# Patient Record
Sex: FEMALE | Race: WHITE | NOT HISPANIC OR LATINO | Employment: FULL TIME | URBAN - METROPOLITAN AREA
[De-identification: names, ages, dates, MRNs, and addresses within clinical notes are randomized per-mention and may not be internally consistent; named-entity substitution may affect disease eponyms.]

---

## 2017-10-11 ENCOUNTER — TRANSCRIBE ORDERS (OUTPATIENT)
Dept: ADMINISTRATIVE | Facility: HOSPITAL | Age: 57
End: 2017-10-11

## 2017-10-11 DIAGNOSIS — Z12.39 BREAST CANCER SCREENING: Primary | ICD-10-CM

## 2017-10-19 ENCOUNTER — HOSPITAL ENCOUNTER (OUTPATIENT)
Dept: RADIOLOGY | Facility: HOSPITAL | Age: 57
Discharge: HOME | End: 2017-10-19
Attending: OBSTETRICS & GYNECOLOGY
Payer: COMMERCIAL

## 2017-10-19 DIAGNOSIS — Z12.39 BREAST CANCER SCREENING: ICD-10-CM

## 2017-10-19 PROCEDURE — G0202 SCR MAMMO BI INCL CAD: HCPCS

## 2018-11-28 LAB — HBA1C MFR BLD HPLC: 8.3 %

## 2019-03-01 LAB
CREAT ?TM UR-SCNC: 17.2 UMOL/L
CREAT ?TM UR-SCNC: 17.2 UMOL/L
HBA1C MFR BLD HPLC: 7.8 %
HBA1C MFR BLD HPLC: 7.8 %
MICROALBUMIN/CREAT UR: <17.4 MG/G{CREAT}

## 2019-07-23 LAB
CREAT ?TM UR-SCNC: 19.6 UMOL/L
HBA1C MFR BLD HPLC: 6.6 %

## 2019-11-01 LAB
HBA1C MFR BLD HPLC: 5.6 %
HBA1C MFR BLD HPLC: 6.6 %

## 2020-02-13 ENCOUNTER — OFFICE VISIT (OUTPATIENT)
Dept: ENDOCRINOLOGY | Facility: CLINIC | Age: 60
End: 2020-02-13
Payer: COMMERCIAL

## 2020-02-13 VITALS — WEIGHT: 257.6 LBS | SYSTOLIC BLOOD PRESSURE: 134 MMHG | HEART RATE: 84 BPM | DIASTOLIC BLOOD PRESSURE: 78 MMHG

## 2020-02-13 DIAGNOSIS — I10 ESSENTIAL HYPERTENSION: ICD-10-CM

## 2020-02-13 DIAGNOSIS — E78.2 MIXED HYPERLIPIDEMIA: ICD-10-CM

## 2020-02-13 DIAGNOSIS — E55.9 VITAMIN D DEFICIENCY: ICD-10-CM

## 2020-02-13 DIAGNOSIS — Z98.84 HISTORY OF WEIGHT LOSS SURGERY: ICD-10-CM

## 2020-02-13 DIAGNOSIS — E11.65 TYPE 2 DIABETES MELLITUS WITH HYPERGLYCEMIA, WITHOUT LONG-TERM CURRENT USE OF INSULIN (HCC): Primary | ICD-10-CM

## 2020-02-13 LAB — SL AMB POCT HEMOGLOBIN AIC: 6.9 (ref ?–6.5)

## 2020-02-13 PROCEDURE — 83036 HEMOGLOBIN GLYCOSYLATED A1C: CPT | Performed by: INTERNAL MEDICINE

## 2020-02-13 PROCEDURE — 99204 OFFICE O/P NEW MOD 45 MIN: CPT | Performed by: INTERNAL MEDICINE

## 2020-02-13 RX ORDER — MULTIVIT-MIN/IRON FUM/FOLIC AC 7.5 MG-4
1 TABLET ORAL DAILY
COMMUNITY

## 2020-02-13 RX ORDER — MOMETASONE FUROATE 1 MG/ML
SOLUTION TOPICAL DAILY
COMMUNITY
End: 2021-05-26

## 2020-02-13 RX ORDER — LANOLIN ALCOHOL/MO/W.PET/CERES
10000 CREAM (GRAM) TOPICAL DAILY
COMMUNITY

## 2020-02-13 RX ORDER — ZOLPIDEM TARTRATE 5 MG/1
2.5 TABLET ORAL
COMMUNITY

## 2020-02-13 RX ORDER — FLASH GLUCOSE SENSOR
KIT MISCELLANEOUS
COMMUNITY
End: 2020-07-23 | Stop reason: SDUPTHER

## 2020-02-13 RX ORDER — OCTISALATE, AVOBENZONE, HOMOSALATE, AND OCTOCRYLENE 29.4; 29.4; 49; 25.48 MG/ML; MG/ML; MG/ML; MG/ML
LOTION TOPICAL
COMMUNITY

## 2020-02-13 RX ORDER — LISINOPRIL 2.5 MG/1
2.5 TABLET ORAL DAILY
COMMUNITY

## 2020-02-13 RX ORDER — FLASH GLUCOSE SCANNING READER
EACH MISCELLANEOUS
COMMUNITY

## 2020-02-13 RX ORDER — ROSUVASTATIN CALCIUM 5 MG/1
5 TABLET, COATED ORAL DAILY
COMMUNITY

## 2020-02-13 RX ORDER — BIOTIN 1000 MCG
6000 TABLET,CHEWABLE ORAL
COMMUNITY

## 2020-02-13 RX ORDER — CALCIUM CARBONATE 500(1250)
TABLET ORAL
COMMUNITY
Start: 2015-01-21

## 2020-02-13 RX ORDER — NYSTATIN 100000 U/G
CREAM TOPICAL 2 TIMES DAILY
COMMUNITY

## 2020-02-13 RX ORDER — LANSOPRAZOLE 15 MG/1
15 CAPSULE, DELAYED RELEASE ORAL DAILY
COMMUNITY

## 2020-02-13 RX ORDER — GLIMEPIRIDE 1 MG/1
0.5 TABLET ORAL
COMMUNITY
End: 2020-02-13 | Stop reason: ALTCHOICE

## 2020-02-13 NOTE — PROGRESS NOTES
Serena Thomas Race 63 y o  female MRN: [id number]    Encounter: [id number]      Assessment/Plan     Assessment: This is a 63y o -year-old female with diabetes with hyperglycemia  Plan:    Diagnoses and all orders for this visit:    Type 2 diabetes mellitus with hyperglycemia, without long-term current use of insulin ([facility] )  Lab Results   Component Value Date    HGBA1C 6 9 (A) 02/13/2020    A1c is 6 9 percent  Goal is 6 5 percent  Will discontinue glimepiride, as patient is having hypoglycemic events if she skips her meals sometimes  Will start Farxiga 5 milligram daily, discussed the efficacy and safety and risk of vaginal infection as well as urine tract infection  Continue metformin 1000 milligram twice a day  Will have to monitor her creatinine closely  Obtain basic metabolic profile in 1 month  Discussed hypoglycemia symptoms and treatment    Discussed importance of follow-up with Ophthalmology and podiatry  -     Dapagliflozin Propanediol (FARXIGA) 5 MG TABS; Take one tablet daily with breakfast  -     Basic metabolic panel; Future  -     Basic metabolic panel; Future  -     Microalbumin / creatinine urine ratio; Future  -     Hemoglobin A1C; Future  -     POCT hemoglobin A1c    Mixed hyperlipidemia  Continue Crestor 5 milligram daily  -     Lipid panel Lab Collect Lab Collect; Future    Essential hypertension  Pressure well controlled  Continue current management    Vitamin D deficiency  Is currently taking vitamin-D supplement 4000 International Units daily  Followed by PCP  History of weight loss surgery    Discussed importance of weight loss, lifestyle modifications, dietary modifications such as cutting down on free sugars, free carbohydrates, as well as saturated fats  Discussed to increase serving of fruits and vegetables in diet(3-5 servings per day)   Other orders  -     Calcium 500 MG tablet; Take by mouth  -     Probiotic Product (ALIGN) 4 MG CAPS;  Take by mouth  -     vitamin B-12 (VITAMIN B-12) 1,000 mcg tablet; Take 10,000 mcg by mouth daily  -     Continuous Blood Gluc  (FREESTYLE FRANKI 14 DAY READER) SUZY; by Does not apply route  -     Continuous Blood Gluc Sensor (FREESTYLE FRANKI 14 DAY SENSOR) MISC; by Does not apply route  -     Discontinue: glimepiride (AMARYL) 1 mg tablet; Take 0 5 mg by mouth every morning before breakfast  -     lisinopril (ZESTRIL) 2 5 mg tablet; Take 2 5 mg by mouth daily  -     Biotin 1000 MCG CHEW; Chew 6,000 mcg  -     metFORMIN (GLUCOPHAGE) 500 mg tablet; Take 1,000 mg by mouth 2 (two) times a day with meals  -     mometasone (ELOCON) 0 1 % lotion; Apply topically daily  -     Multiple Vitamins-Minerals (MULTIVITAMIN WITH MINERALS) tablet; Take 1 tablet by mouth daily  -     nystatin (MYCOSTATIN) cream; Apply topically 2 (two) times a day  -     lansoprazole (PREVACID) 15 mg capsule; Take 15 mg by mouth daily  -     Oxymetazoline HCl (RHOFADE) 1 % CREA; Apply topically  -     rosuvastatin (CRESTOR) 5 mg tablet; Take 5 mg by mouth daily  -     Cholecalciferol (VITAMIN D3 SUPER STRENGTH) 50 MCG (2000 UT) CAPS; Take 4,000 Units by mouth  -     zolpidem (AMBIEN) 5 mg tablet; Take 2 5 mg by mouth daily at bedtime as needed for sleep        CC: Diabetes    History of Present Illness     HPI:    Evelia Mejia is 63 yr old woman with PMH of type 2 DM, for 10 yrs, hypertension, hyperlipidemia is here for evaluation of type 2 diabetes management  History of gastric sleeve surgery for weight loss 5 years ago, lost 100 lb after surgery and she has gained back few lb recently  She has history of insulin use previously before surgery for diabetes management  Since surgery she was started on oral medications    Currently she is taking metformin 1000 mg twice a day, glimepiride 0 5 mg with breakfast  Denies having hypoglycemia frequently  She  gets low blood sugar twice a month    She checks blood sugars 4 times daily, using Free style franki sensor   We could not download her sensor   Her average blood sugars are in 120 range  No hypoglycemia in last 2 weeks    For hypertension she is currently taking lisinopril 2 5 mg daily  For hyperlipidemia she is taking Crestor 5 mg daily    She has history of pancreatitis from [place]   She is also taking vitamin supplementation and calcium supplementation, for weight loss surgery status, which is monitored by her primary care physician        No results found for: CREATININE         Lab Results   Component Value Date    HGBA1C 6 9 (A) 02/13/2020         No results found for: [id number], TSH, X6KOWDY, G7UGNFF, THYROIDAB      Review of Systems   Constitutional: Negative for activity change, diaphoresis, fatigue, fever and unexpected weight change  HENT: Negative  Eyes: Negative for visual disturbance  Respiratory: Negative for cough, chest tightness and shortness of breath  Cardiovascular: Negative for chest pain, palpitations and leg swelling  Gastrointestinal: Negative for abdominal pain, blood in stool, constipation, diarrhea, nausea and vomiting  Endocrine: Negative for cold intolerance, heat intolerance, polydipsia, polyphagia and polyuria  Genitourinary: Negative for dysuria, enuresis, frequency and urgency  Musculoskeletal: Negative for arthralgias and myalgias  Skin: Negative for pallor, rash and wound  Allergic/Immunologic: Negative  Neurological: Negative for dizziness, tremors, weakness and numbness  Hematological: Negative  Psychiatric/Behavioral: Negative          Historical Information   Past Medical History:   Diagnosis Date    Acid reflux     CKD (chronic kidney disease), stage II     Essential hypertension     Hyperlipidemia     Type 2 diabetes mellitus (HCC)      Past Surgical History:   Procedure Laterality Date    ARTHROSCOPIC REPAIR ACL Right     knee    BARIATRIC SURGERY      gastric sleeve    GALLBLADDER SURGERY      SKIN BIOPSY      scalp    TENDON REPAIR Left     ankle    TONSILLECTOMY       Social History   Social History     Substance and Sexual Activity   Alcohol Use Not on file     Social History     Substance and Sexual Activity   Drug Use Not on file     Social History     Tobacco Use   Smoking Status Never Smoker   Smokeless Tobacco Never Used     Family History:   Family History   Problem Relation Age of Onset    Hypertension Mother     Diabetes type II Father     Diabetes type II Paternal Grandmother        Meds/Allergies   Current Outpatient Medications   Medication Sig Dispense Refill    Biotin 1000 MCG CHEW Chew 6,000 mcg      Calcium 500 MG tablet Take by mouth      Cholecalciferol (VITAMIN D3 SUPER STRENGTH) 50 MCG (2000 UT) CAPS Take 4,000 Units by mouth      Continuous Blood Gluc  (FREESTYLE FRANKI 14 DAY READER) SUZY by Does not apply route      Continuous Blood Gluc Sensor (FREESTYLE FRANKI 14 DAY SENSOR) MISC by Does not apply route      lansoprazole (PREVACID) 15 mg capsule Take 15 mg by mouth daily      lisinopril (ZESTRIL) 2 5 mg tablet Take 2 5 mg by mouth daily      metFORMIN (GLUCOPHAGE) 500 mg tablet Take 1,000 mg by mouth 2 (two) times a day with meals      mometasone (ELOCON) 0 1 % lotion Apply topically daily      Multiple Vitamins-Minerals (MULTIVITAMIN WITH MINERALS) tablet Take 1 tablet by mouth daily      nystatin (MYCOSTATIN) cream Apply topically 2 (two) times a day      Oxymetazoline HCl (RHOFADE) 1 % CREA Apply topically      Probiotic Product (ALIGN) 4 MG CAPS Take by mouth      rosuvastatin (CRESTOR) 5 mg tablet Take 5 mg by mouth daily      vitamin B-12 (VITAMIN B-12) 1,000 mcg tablet Take 10,000 mcg by mouth daily      zolpidem (AMBIEN) 5 mg tablet Take 2 5 mg by mouth daily at bedtime as needed for sleep      Dapagliflozin Propanediol (FARXIGA) 5 MG TABS Take one tablet daily with breakfast 30 tablet 3     No current facility-administered medications for this visit        Allergies   Allergen Reactions    Ampicillin  Darvon [Propoxyphene]     Vioxx [Rofecoxib]        Objective   Vitals: Blood pressure 134/78, pulse 84, weight 117 kg (257 lb 9 6 oz)  Physical Exam   Constitutional: She is oriented to person, place, and time  She appears well-developed and well-nourished  No distress  Obese, no facial plethora   HENT:   Head: Normocephalic and atraumatic  Eyes: Conjunctivae and EOM are normal  Right eye exhibits no discharge  Left eye exhibits no discharge  Neck: Normal range of motion  Neck supple  No thyromegaly present  Cardiovascular: Normal rate and regular rhythm  No murmur heard  Pulmonary/Chest: Effort normal and breath sounds normal  She has no wheezes  Abdominal: Soft  Bowel sounds are normal  She exhibits no distension  Musculoskeletal: Normal range of motion  She exhibits no edema, tenderness or deformity  Neurological: She is alert and oriented to person, place, and time  She has normal reflexes  She displays normal reflexes  No cranial nerve deficit  She exhibits normal muscle tone  Skin: Skin is warm and dry  No rash noted  She is not diaphoretic  No erythema  Psychiatric: She has a normal mood and affect  Her behavior is normal    Vitals reviewed  The history was obtained from the review of the chart, patient  Lab Results:   Lab Results   Component Value Date/Time    Hemoglobin A1C 6 9 (A) 02/13/2020 09:45 AM    Hemoglobin A1C 5 6 11/01/2019    Hemoglobin A1C 7 8 03/01/2019           Imaging Studies: I have personally reviewed pertinent reports  Portions of the record may have been created with voice recognition software  Occasional wrong word or "sound a like" substitutions may have occurred due to the inherent limitations of voice recognition software  Read the chart carefully and recognize, using context, where substitutions have occurred

## 2020-02-24 DIAGNOSIS — E66.9 DIABETES MELLITUS TYPE 2 IN OBESE (HCC): Primary | ICD-10-CM

## 2020-02-24 DIAGNOSIS — E11.69 DIABETES MELLITUS TYPE 2 IN OBESE (HCC): Primary | ICD-10-CM

## 2020-02-27 DIAGNOSIS — E11.69 DIABETES MELLITUS TYPE 2 IN OBESE (HCC): Primary | ICD-10-CM

## 2020-02-27 DIAGNOSIS — E11.65 UNCONTROLLED TYPE 2 DIABETES MELLITUS WITH HYPERGLYCEMIA (HCC): ICD-10-CM

## 2020-02-27 DIAGNOSIS — E66.9 DIABETES MELLITUS TYPE 2 IN OBESE (HCC): Primary | ICD-10-CM

## 2020-02-27 RX ORDER — METFORMIN HYDROCHLORIDE 500 MG/1
1000 TABLET, EXTENDED RELEASE ORAL 2 TIMES DAILY WITH MEALS
Qty: 360 TABLET | Refills: 1 | Status: SHIPPED | OUTPATIENT
Start: 2020-02-27 | End: 2020-09-09

## 2020-02-27 NOTE — TELEPHONE ENCOUNTER
LM that Metformin ER was called into local pharm   Asked that pt call with any questions or concerns

## 2020-02-27 NOTE — TELEPHONE ENCOUNTER
Patient called said she was on Metformin ER, but Metformin was called in  Do you want patient on Metformin ER or HCL?   Please advise

## 2020-05-21 ENCOUNTER — TELEMEDICINE (OUTPATIENT)
Dept: ENDOCRINOLOGY | Facility: CLINIC | Age: 60
End: 2020-05-21
Payer: COMMERCIAL

## 2020-05-21 DIAGNOSIS — E11.65 TYPE 2 DIABETES MELLITUS WITH HYPERGLYCEMIA, WITHOUT LONG-TERM CURRENT USE OF INSULIN (HCC): Primary | ICD-10-CM

## 2020-05-21 DIAGNOSIS — E78.2 MIXED HYPERLIPIDEMIA: ICD-10-CM

## 2020-05-21 DIAGNOSIS — I10 ESSENTIAL HYPERTENSION: ICD-10-CM

## 2020-05-21 PROCEDURE — 99214 OFFICE O/P EST MOD 30 MIN: CPT | Performed by: PHYSICIAN ASSISTANT

## 2020-06-03 ENCOUNTER — APPOINTMENT (OUTPATIENT)
Dept: LAB | Facility: CLINIC | Age: 60
End: 2020-06-03
Payer: COMMERCIAL

## 2020-06-03 DIAGNOSIS — E78.2 MIXED HYPERLIPIDEMIA: ICD-10-CM

## 2020-06-03 DIAGNOSIS — E11.65 TYPE 2 DIABETES MELLITUS WITH HYPERGLYCEMIA, WITHOUT LONG-TERM CURRENT USE OF INSULIN (HCC): ICD-10-CM

## 2020-06-03 LAB
ANION GAP SERPL CALCULATED.3IONS-SCNC: 8 MMOL/L (ref 4–13)
BUN SERPL-MCNC: 19 MG/DL (ref 5–25)
CALCIUM SERPL-MCNC: 9.1 MG/DL (ref 8.3–10.1)
CHLORIDE SERPL-SCNC: 102 MMOL/L (ref 100–108)
CHOLEST SERPL-MCNC: 171 MG/DL (ref 50–200)
CO2 SERPL-SCNC: 25 MMOL/L (ref 21–32)
CREAT SERPL-MCNC: 0.8 MG/DL (ref 0.6–1.3)
CREAT UR-MCNC: 48 MG/DL
EST. AVERAGE GLUCOSE BLD GHB EST-MCNC: 169 MG/DL
GFR SERPL CREATININE-BSD FRML MDRD: 82 ML/MIN/1.73SQ M
GLUCOSE P FAST SERPL-MCNC: 163 MG/DL (ref 65–99)
HBA1C MFR BLD: 7.5 %
HDLC SERPL-MCNC: 69 MG/DL
LDLC SERPL CALC-MCNC: 76 MG/DL (ref 0–100)
MICROALBUMIN UR-MCNC: 8.2 MG/L (ref 0–20)
MICROALBUMIN/CREAT 24H UR: 17 MG/G CREATININE (ref 0–30)
NONHDLC SERPL-MCNC: 102 MG/DL
POTASSIUM SERPL-SCNC: 4.2 MMOL/L (ref 3.5–5.3)
SODIUM SERPL-SCNC: 135 MMOL/L (ref 136–145)
TRIGL SERPL-MCNC: 129 MG/DL

## 2020-06-03 PROCEDURE — 80061 LIPID PANEL: CPT

## 2020-06-03 PROCEDURE — 83036 HEMOGLOBIN GLYCOSYLATED A1C: CPT

## 2020-06-03 PROCEDURE — 82043 UR ALBUMIN QUANTITATIVE: CPT

## 2020-06-03 PROCEDURE — 80048 BASIC METABOLIC PNL TOTAL CA: CPT

## 2020-06-03 PROCEDURE — 82570 ASSAY OF URINE CREATININE: CPT

## 2020-06-03 PROCEDURE — 36415 COLL VENOUS BLD VENIPUNCTURE: CPT

## 2020-06-08 ENCOUNTER — TELEPHONE (OUTPATIENT)
Dept: ENDOCRINOLOGY | Facility: CLINIC | Age: 60
End: 2020-06-08

## 2020-06-11 DIAGNOSIS — E11.65 TYPE 2 DIABETES MELLITUS WITH HYPERGLYCEMIA, WITHOUT LONG-TERM CURRENT USE OF INSULIN (HCC): ICD-10-CM

## 2020-07-23 DIAGNOSIS — E11.65 TYPE 2 DIABETES MELLITUS WITH HYPERGLYCEMIA, WITHOUT LONG-TERM CURRENT USE OF INSULIN (HCC): Primary | ICD-10-CM

## 2020-07-23 RX ORDER — FLASH GLUCOSE SENSOR
1 KIT MISCELLANEOUS
Qty: 6 EACH | Refills: 3 | Status: SHIPPED | OUTPATIENT
Start: 2020-07-23 | End: 2021-08-18 | Stop reason: SDUPTHER

## 2020-07-23 NOTE — TELEPHONE ENCOUNTER
Pt needs an order for a marija sensor  Hers fell off, she had to use her 2nd one up spoke with the nurse, but pt will have to pay for a sensor, insurance wont cover it    Please send the order to [facility]

## 2020-09-09 DIAGNOSIS — E11.65 UNCONTROLLED TYPE 2 DIABETES MELLITUS WITH HYPERGLYCEMIA (HCC): ICD-10-CM

## 2020-09-09 RX ORDER — METFORMIN HYDROCHLORIDE 500 MG/1
TABLET, EXTENDED RELEASE ORAL
Qty: 120 TABLET | Refills: 5 | Status: SHIPPED | OUTPATIENT
Start: 2020-09-09 | End: 2021-02-19

## 2020-09-18 ENCOUNTER — TELEPHONE (OUTPATIENT)
Dept: ENDOCRINOLOGY | Facility: CLINIC | Age: 60
End: 2020-09-18

## 2020-09-18 NOTE — TELEPHONE ENCOUNTER
Pt r/s her appt so she can get her labs done prior  She stated she is having issues with the farxiga, she thinks  Sugars have been running high, not sure if she needs a higher dose   she is sharing her [employer] with us,

## 2020-09-30 ENCOUNTER — TELEPHONE (OUTPATIENT)
Dept: OBGYN CLINIC | Facility: HOSPITAL | Age: 60
End: 2020-09-30

## 2020-09-30 DIAGNOSIS — E11.65 TYPE 2 DIABETES MELLITUS WITH HYPERGLYCEMIA, WITHOUT LONG-TERM CURRENT USE OF INSULIN (HCC): ICD-10-CM

## 2020-09-30 RX ORDER — DAPAGLIFLOZIN 5 MG/1
TABLET, FILM COATED ORAL
Qty: 30 TABLET | Refills: 3 | Status: SHIPPED | OUTPATIENT
Start: 2020-09-30 | End: 2020-11-16

## 2020-10-14 ENCOUNTER — OFFICE VISIT (OUTPATIENT)
Dept: OBGYN CLINIC | Facility: CLINIC | Age: 60
End: 2020-10-14
Payer: COMMERCIAL

## 2020-10-14 ENCOUNTER — APPOINTMENT (OUTPATIENT)
Dept: RADIOLOGY | Facility: CLINIC | Age: 60
End: 2020-10-14
Payer: COMMERCIAL

## 2020-10-14 VITALS
HEIGHT: 65 IN | HEART RATE: 62 BPM | DIASTOLIC BLOOD PRESSURE: 84 MMHG | WEIGHT: 257.2 LBS | SYSTOLIC BLOOD PRESSURE: 132 MMHG | BODY MASS INDEX: 42.85 KG/M2 | TEMPERATURE: 99.1 F

## 2020-10-14 DIAGNOSIS — G89.29 CHRONIC PAIN OF RIGHT KNEE: ICD-10-CM

## 2020-10-14 DIAGNOSIS — M25.561 CHRONIC PAIN OF RIGHT KNEE: ICD-10-CM

## 2020-10-14 DIAGNOSIS — M17.31 POST-TRAUMATIC OSTEOARTHRITIS OF RIGHT KNEE: Primary | ICD-10-CM

## 2020-10-14 DIAGNOSIS — E11.9 TYPE 2 DIABETES MELLITUS WITHOUT COMPLICATION, WITHOUT LONG-TERM CURRENT USE OF INSULIN (HCC): ICD-10-CM

## 2020-10-14 DIAGNOSIS — M25.561 RIGHT KNEE PAIN, UNSPECIFIED CHRONICITY: ICD-10-CM

## 2020-10-14 PROCEDURE — 73562 X-RAY EXAM OF KNEE 3: CPT

## 2020-10-14 PROCEDURE — 99203 OFFICE O/P NEW LOW 30 MIN: CPT | Performed by: ORTHOPAEDIC SURGERY

## 2020-11-12 ENCOUNTER — OFFICE VISIT (OUTPATIENT)
Dept: OBGYN CLINIC | Facility: CLINIC | Age: 60
End: 2020-11-12
Payer: COMMERCIAL

## 2020-11-12 VITALS
WEIGHT: 260 LBS | DIASTOLIC BLOOD PRESSURE: 84 MMHG | BODY MASS INDEX: 43.32 KG/M2 | SYSTOLIC BLOOD PRESSURE: 129 MMHG | HEIGHT: 65 IN | HEART RATE: 71 BPM

## 2020-11-12 DIAGNOSIS — M17.31 POST-TRAUMATIC OSTEOARTHRITIS OF RIGHT KNEE: Primary | ICD-10-CM

## 2020-11-12 DIAGNOSIS — M25.561 CHRONIC PAIN OF RIGHT KNEE: ICD-10-CM

## 2020-11-12 DIAGNOSIS — G89.29 CHRONIC PAIN OF RIGHT KNEE: ICD-10-CM

## 2020-11-12 PROCEDURE — 20610 DRAIN/INJ JOINT/BURSA W/O US: CPT | Performed by: ORTHOPAEDIC SURGERY

## 2020-11-13 ENCOUNTER — TRANSCRIBE ORDERS (OUTPATIENT)
Dept: LAB | Facility: CLINIC | Age: 60
End: 2020-11-13

## 2020-11-13 ENCOUNTER — LAB (OUTPATIENT)
Dept: LAB | Facility: CLINIC | Age: 60
End: 2020-11-13
Payer: COMMERCIAL

## 2020-11-13 DIAGNOSIS — E11.65 TYPE 2 DIABETES MELLITUS WITH HYPERGLYCEMIA, WITHOUT LONG-TERM CURRENT USE OF INSULIN (HCC): ICD-10-CM

## 2020-11-13 DIAGNOSIS — I10 ESSENTIAL HYPERTENSION: ICD-10-CM

## 2020-11-13 LAB
ANION GAP SERPL CALCULATED.3IONS-SCNC: 5 MMOL/L (ref 4–13)
BUN SERPL-MCNC: 15 MG/DL (ref 5–25)
CALCIUM SERPL-MCNC: 9.5 MG/DL (ref 8.3–10.1)
CHLORIDE SERPL-SCNC: 101 MMOL/L (ref 100–108)
CO2 SERPL-SCNC: 30 MMOL/L (ref 21–32)
CREAT SERPL-MCNC: 0.88 MG/DL (ref 0.6–1.3)
EST. AVERAGE GLUCOSE BLD GHB EST-MCNC: 194 MG/DL
GFR SERPL CREATININE-BSD FRML MDRD: 72 ML/MIN/1.73SQ M
GLUCOSE P FAST SERPL-MCNC: 161 MG/DL (ref 65–99)
HBA1C MFR BLD: 8.4 %
POTASSIUM SERPL-SCNC: 4.4 MMOL/L (ref 3.5–5.3)
SODIUM SERPL-SCNC: 136 MMOL/L (ref 136–145)

## 2020-11-13 PROCEDURE — 83036 HEMOGLOBIN GLYCOSYLATED A1C: CPT

## 2020-11-13 PROCEDURE — 80048 BASIC METABOLIC PNL TOTAL CA: CPT

## 2020-11-13 PROCEDURE — 36415 COLL VENOUS BLD VENIPUNCTURE: CPT

## 2020-11-16 ENCOUNTER — OFFICE VISIT (OUTPATIENT)
Dept: ENDOCRINOLOGY | Facility: CLINIC | Age: 60
End: 2020-11-16
Payer: COMMERCIAL

## 2020-11-16 VITALS
SYSTOLIC BLOOD PRESSURE: 124 MMHG | BODY MASS INDEX: 43.43 KG/M2 | WEIGHT: 261 LBS | DIASTOLIC BLOOD PRESSURE: 78 MMHG | TEMPERATURE: 97.1 F | HEART RATE: 71 BPM

## 2020-11-16 DIAGNOSIS — E11.65 TYPE 2 DIABETES MELLITUS WITH HYPERGLYCEMIA, WITHOUT LONG-TERM CURRENT USE OF INSULIN (HCC): Primary | ICD-10-CM

## 2020-11-16 DIAGNOSIS — I10 ESSENTIAL HYPERTENSION: ICD-10-CM

## 2020-11-16 DIAGNOSIS — E78.2 MIXED HYPERLIPIDEMIA: ICD-10-CM

## 2020-11-16 PROCEDURE — 99214 OFFICE O/P EST MOD 30 MIN: CPT | Performed by: PHYSICIAN ASSISTANT

## 2020-11-16 RX ORDER — DAPAGLIFLOZIN 10 MG/1
10 TABLET, FILM COATED ORAL DAILY
Qty: 30 TABLET | Refills: 5 | Status: SHIPPED | OUTPATIENT
Start: 2020-11-16 | End: 2021-04-19

## 2021-02-17 ENCOUNTER — TELEPHONE (OUTPATIENT)
Dept: ENDOCRINOLOGY | Facility: CLINIC | Age: 61
End: 2021-02-17

## 2021-02-19 DIAGNOSIS — E11.65 UNCONTROLLED TYPE 2 DIABETES MELLITUS WITH HYPERGLYCEMIA (HCC): ICD-10-CM

## 2021-02-19 RX ORDER — METFORMIN HYDROCHLORIDE 500 MG/1
TABLET, EXTENDED RELEASE ORAL
Qty: 120 TABLET | Refills: 5 | Status: SHIPPED | OUTPATIENT
Start: 2021-02-19 | End: 2021-09-11

## 2021-02-22 ENCOUNTER — LAB (OUTPATIENT)
Dept: LAB | Facility: CLINIC | Age: 61
End: 2021-02-22
Payer: COMMERCIAL

## 2021-02-22 ENCOUNTER — TRANSCRIBE ORDERS (OUTPATIENT)
Dept: LAB | Facility: CLINIC | Age: 61
End: 2021-02-22

## 2021-02-22 DIAGNOSIS — I10 ESSENTIAL HYPERTENSION: ICD-10-CM

## 2021-02-22 DIAGNOSIS — E11.65 TYPE 2 DIABETES MELLITUS WITH HYPERGLYCEMIA, WITHOUT LONG-TERM CURRENT USE OF INSULIN (HCC): ICD-10-CM

## 2021-02-22 LAB
ANION GAP SERPL CALCULATED.3IONS-SCNC: 5 MMOL/L (ref 4–13)
BUN SERPL-MCNC: 18 MG/DL (ref 5–25)
CALCIUM SERPL-MCNC: 9.9 MG/DL (ref 8.3–10.1)
CHLORIDE SERPL-SCNC: 103 MMOL/L (ref 100–108)
CO2 SERPL-SCNC: 30 MMOL/L (ref 21–32)
CREAT SERPL-MCNC: 0.82 MG/DL (ref 0.6–1.3)
EST. AVERAGE GLUCOSE BLD GHB EST-MCNC: 171 MG/DL
GFR SERPL CREATININE-BSD FRML MDRD: 79 ML/MIN/1.73SQ M
GLUCOSE P FAST SERPL-MCNC: 167 MG/DL (ref 65–99)
HBA1C MFR BLD: 7.6 %
POTASSIUM SERPL-SCNC: 4.4 MMOL/L (ref 3.5–5.3)
SODIUM SERPL-SCNC: 138 MMOL/L (ref 136–145)

## 2021-02-22 PROCEDURE — 83036 HEMOGLOBIN GLYCOSYLATED A1C: CPT

## 2021-02-22 PROCEDURE — 80048 BASIC METABOLIC PNL TOTAL CA: CPT

## 2021-02-22 PROCEDURE — 36415 COLL VENOUS BLD VENIPUNCTURE: CPT

## 2021-02-23 ENCOUNTER — OFFICE VISIT (OUTPATIENT)
Dept: ENDOCRINOLOGY | Facility: CLINIC | Age: 61
End: 2021-02-23
Payer: COMMERCIAL

## 2021-02-23 ENCOUNTER — TELEPHONE (OUTPATIENT)
Dept: ENDOCRINOLOGY | Facility: CLINIC | Age: 61
End: 2021-02-23

## 2021-02-23 VITALS
SYSTOLIC BLOOD PRESSURE: 134 MMHG | HEART RATE: 64 BPM | TEMPERATURE: 98.2 F | WEIGHT: 259.4 LBS | BODY MASS INDEX: 43.17 KG/M2 | DIASTOLIC BLOOD PRESSURE: 80 MMHG

## 2021-02-23 DIAGNOSIS — I10 ESSENTIAL HYPERTENSION: ICD-10-CM

## 2021-02-23 DIAGNOSIS — E11.65 TYPE 2 DIABETES MELLITUS WITH HYPERGLYCEMIA, WITHOUT LONG-TERM CURRENT USE OF INSULIN (HCC): Primary | ICD-10-CM

## 2021-02-23 DIAGNOSIS — E78.2 MIXED HYPERLIPIDEMIA: ICD-10-CM

## 2021-02-23 PROCEDURE — 99214 OFFICE O/P EST MOD 30 MIN: CPT | Performed by: PHYSICIAN ASSISTANT

## 2021-02-23 NOTE — TELEPHONE ENCOUNTER
Pt called she forgot to ask you if there is a way you can recommend she get the Covid shot  As far as I know we arent doing the sched yet of them? ?

## 2021-02-23 NOTE — PATIENT INSTRUCTIONS
Hypoglycemia instructions   Manus Amen Race  2/23/2021  [id number]    Low Blood Sugar    Steps to treat low blood sugar  1  Test blood sugar if you have symptoms of low blood sugar:   Low Blood Sugar Symptoms:  o Sweaty  o Dizzy  o Rapid heartbeat  o Shaky  o Bad mood  o Hungry      2  Treat blood sugar less than 70 with 15 grams of fast-acting carbohydrate:   Examples of 15 grams Fast-Acting Carbohydrate:  o 4 oz juice  o 4 oz regular soda  o 3-4 glucose tablets (chew)  o 3-4 hard candies (chew)          3  Wait 15 minutes and test your blood sugar again     4   If blood sugar is less than 100, repeat steps 2-3     5  When your blood sugar is 100 or more, eat a snack if it will be longer than one hour until your next meal  The snack should be 15 grams of carbohydrate and a protein:   Examples of snacks:  o ½ sandwich  o 6 crackers with cheese  o Piece of fruit with cheese or peanut butter  o 6 crackers with peanut butter

## 2021-02-23 NOTE — PROGRESS NOTES
Patient Progress Note      CC: DM      [facility] Provider  [facility]  [address]     History of Present Illness:   Costa Mclaughlin is a 62 y o  female with a history of type 2 diabetes without long term use of insulin  She has been off of insulin since her gastric sleeve surgery  Diabetes course has been stable  Denies recent illness or hospitalizations  Denies any issues with her current regimen  Home glucose monitoring: are performed regularly, using Freestyle Ankit     Average glucose 147 mg/dl with glucose variability of 12 8%  In target range 95%, above range 5%, below range 0%  Most of the data is not captured  She will call the company to sort the issue out  Current regimen: metformin 1000 mg BID, Farxiga 10 mg daily  compliant most of the time, denies any side effects from medications  Hypoglycemic episodes: No, rare  H/o of hypoglycemia causing hospitalization or Intervention such as glucagon injection  or ambulance call :  No  Hypoglycemia symptoms: jitteriness  Treatment of hypoglycemia: discussed treatment     Medic alert tag: recommended: Yes     Diabetes education: Not recently  Diet: 3 meals per day, 1-2 snacks per day  Timing of meals is predictable  Diabetic diet compliance:  noncompliant some of the time  Activity: Daily activity is predictable: Yes  Exercise is limited right now due to the snow  Ophthamology: thinks she went in the end of 2020  [facility] in [place], [place]  Podiatry: Nov 2020      Has hypertension: on ACE inhibitor/ARB, compliant most of the time  Has hyperlipidemia: on statin - tolerating well, no myalgias  compliant most of the time, denies any side effects from medications    Thyroid disorders: No  History of pancreatitis: Yes (from [place])    Patient Active Problem List   Diagnosis    Type 2 diabetes mellitus ([facility] )    Essential hypertension    Mixed hyperlipidemia      Past Medical History:   Diagnosis Date    Acid reflux     CKD (chronic kidney disease), stage II     Essential hypertension     Hyperlipidemia     Type 2 diabetes mellitus (HCC)       Past Surgical History:   Procedure Laterality Date    ARTHROSCOPIC REPAIR ACL Right     knee    BARIATRIC SURGERY      gastric sleeve    GALLBLADDER SURGERY      SKIN BIOPSY      scalp    TENDON REPAIR Left     ankle    TONSILLECTOMY        Family History   Problem Relation Age of Onset    Hypertension Mother     Diabetes type II Father     Diabetes type II Paternal Grandmother      Social History     Tobacco Use    Smoking status: Never Smoker    Smokeless tobacco: Never Used   Substance Use Topics    Alcohol use: Yes     Comment: occ        Allergies   Allergen Reactions    Ampicillin     Darvon [Propoxyphene]     Vioxx [Rofecoxib]          Current Outpatient Medications:     Biotin 1000 MCG CHEW, Chew 6,000 mcg, Disp: , Rfl:     Calcium 500 MG tablet, Take by mouth, Disp: , Rfl:     Cholecalciferol (VITAMIN D3 SUPER STRENGTH) 50 MCG (2000 UT) CAPS, Take 2,000 Units by mouth , Disp: , Rfl:     Continuous Blood Gluc  (FREESTYLE FRANKI 14 DAY READER) SUZY, by Does not apply route, Disp: , Rfl:     Continuous Blood Gluc Sensor (FREESTYLE FRANKI 14 DAY SENSOR) MISC, 1 Device by Does not apply route every 14 (fourteen) days, Disp: 6 each, Rfl: 3    Dapagliflozin Propanediol (Farxiga) 10 MG TABS, Take 1 tablet (10 mg total) by mouth daily, Disp: 30 tablet, Rfl: 5    lansoprazole (PREVACID) 15 mg capsule, Take 15 mg by mouth daily, Disp: , Rfl:     lisinopril (ZESTRIL) 2 5 mg tablet, Take 2 5 mg by mouth daily, Disp: , Rfl:     metFORMIN (GLUCOPHAGE-XR) 500 mg 24 hr tablet, TAKE 2 TABLETS BY MOUTH TWICE A DAY WITH FOOD, Disp: 120 tablet, Rfl: 5    mometasone (ELOCON) 0 1 % lotion, Apply topically daily, Disp: , Rfl:     Multiple Vitamins-Minerals (MULTIVITAMIN WITH MINERALS) tablet, Take 1 tablet by mouth daily, Disp: , Rfl:     nystatin (MYCOSTATIN) cream, Apply topically 2 (two) times a day, Disp: , Rfl:     Oxymetazoline HCl (RHOFADE) 1 % CREA, Apply topically, Disp: , Rfl:     Probiotic Product (ALIGN) 4 MG CAPS, Take by mouth, Disp: , Rfl:     rosuvastatin (CRESTOR) 5 mg tablet, Take 5 mg by mouth daily, Disp: , Rfl:     vitamin B-12 (VITAMIN B-12) 1,000 mcg tablet, Take 10,000 mcg by mouth daily, Disp: , Rfl:     zolpidem (AMBIEN) 5 mg tablet, Take 2 5 mg by mouth daily at bedtime as needed for sleep, Disp: , Rfl:   Review of Systems   Constitutional: Negative for activity change, appetite change, fatigue and unexpected weight change  HENT: Negative for trouble swallowing  Eyes: Negative for visual disturbance  Respiratory: Negative for shortness of breath  Cardiovascular: Negative for chest pain and palpitations  Gastrointestinal: Negative for constipation and diarrhea  Endocrine: Negative for polydipsia and polyuria  Musculoskeletal: Negative  Skin: Negative for wound  Neurological: Negative for numbness  Psychiatric/Behavioral: Negative  Physical Exam:  Body mass index is 43 17 kg/m²  /80   Pulse 64   Temp 98 2 °F (36 8 °C)   Wt 118 kg (259 lb 6 4 oz)   BMI 43 17 kg/m²    Wt Readings from Last 3 Encounters:   02/23/21 118 kg (259 lb 6 4 oz)   11/16/20 118 kg (261 lb)   11/12/20 118 kg (260 lb)       Physical Exam  Vitals signs and nursing note reviewed  Constitutional:       Appearance: She is well-developed  HENT:      Head: Normocephalic  Eyes:      General: No scleral icterus  Pupils: Pupils are equal, round, and reactive to light  Neck:      Musculoskeletal: Neck supple  Thyroid: No thyromegaly  Cardiovascular:      Rate and Rhythm: Normal rate and regular rhythm  Pulses:           Radial pulses are 2+ on the right side and 2+ on the left side  Heart sounds: No murmur  Pulmonary:      Effort: Pulmonary effort is normal  No respiratory distress        Breath sounds: Normal breath sounds  No wheezing  Skin:     General: Skin is warm and dry  Neurological:      Mental Status: She is alert  Patient's shoes and socks were not removed  Labs:   Component      Latest Ref Rng & Units 6/3/2020 11/13/2020 2/22/2021   Sodium      136 - 145 mmol/L 135 (L) 136 138   Potassium      3 5 - 5 3 mmol/L 4 2 4 4 4 4   Chloride      100 - 108 mmol/L 102 101 103   CO2      21 - 32 mmol/L 25 30 30   Anion Gap      4 - 13 mmol/L 8 5 5   BUN      5 - 25 mg/dL 19 15 18   Creatinine      0 60 - 1 30 mg/dL 0 80 0 88 0 82   GLUCOSE FASTING      65 - 99 mg/dL 163 (H) 161 (H) 167 (H)   Calcium      8 3 - 10 1 mg/dL 9 1 9 5 9 9   eGFR      ml/min/1 73sq m 82 72 79   Cholesterol      50 - 200 mg/dL 171     Triglycerides      <=150 mg/dL 129     HDL      >=40 mg/dL 69     LDL Calculated      0 - 100 mg/dL 76     Non-HDL Cholesterol      mg/dl 102     EXT Creatinine Urine      mg/dL 48 0     MICROALBUM ,U,RANDOM      0 0 - 20 0 mg/L 8 2     MICROALBUMIN/CREATININE RATIO      0 - 30 mg/g creatinine 17     Hemoglobin A1C      Normal 3 8-5 6%; PreDiabetic 5 7-6 4%; Diabetic >=6 5%; Glycemic control for adults with diabetes <7 0% % 7 5 (H) 8 4 (H) 7 6 (H)   eAG, EST AVG Glucose      mg/dl 169 194 171     Plan:    Diagnoses and all orders for this visit:    Type 2 diabetes mellitus with hyperglycemia, without long-term current use of insulin ([facility])  HGA1C 7 6%  Improved  Treatment regimen: advised patient to contact [employer] regarding issue with data capture  Once issue is resolved, download again in 2 weeks  For now continue current treatment  If BG levels are high, will consider adding low dose glipizide  Discussed risks/complications associated with uncontrolled diabetes  Advised to adhere to diabetic diet, and recommended staying active/exercising routinely as tolerated  Keep carbohydrates consistent to limit blood glucose fluctuations    Advised to call if blood sugars less than 70 mg/dl or over 300 mg/dl  Check blood glucose 2 times a day  Discussed symptoms and treatment of hypoglycemia  Discussed use of CGM to collect additional blood glucose data to reveal trends and patterns that can be used to optimize treatment plan  Recommended routine follow-up with podiatry and ophthalmology  Send log in 2 weeks  Ordered blood work to complete prior to next visit  -     Hemoglobin A1C; Future  -     Basic metabolic panel; Future  -     Microalbumin / creatinine urine ratio; Future    Essential hypertension  Blood pressure well controlled, continue current treatment   -     Basic metabolic panel; Future    Mixed hyperlipidemia  LDL previously 76  Continue statin therapy          Discussed with the patient diagnosis and treatment and all questions fully answered  She will call me if any problems arise  Counseled patient on diagnostic results, prognosis, risk and benefit of treatment options, instruction for management, importance of treatment compliance, risk factor reduction and impressions        Brenda Mary PA-C

## 2021-03-10 ENCOUNTER — APPOINTMENT (EMERGENCY)
Dept: RADIOLOGY | Facility: HOSPITAL | Age: 61
End: 2021-03-10
Payer: COMMERCIAL

## 2021-03-10 ENCOUNTER — APPOINTMENT (EMERGENCY)
Dept: CT IMAGING | Facility: HOSPITAL | Age: 61
End: 2021-03-10
Payer: COMMERCIAL

## 2021-03-10 ENCOUNTER — HOSPITAL ENCOUNTER (EMERGENCY)
Facility: HOSPITAL | Age: 61
Discharge: HOME | End: 2021-03-10
Attending: EMERGENCY MEDICINE | Admitting: EMERGENCY MEDICINE
Payer: COMMERCIAL

## 2021-03-10 ENCOUNTER — OFFICE VISIT (OUTPATIENT)
Dept: OBGYN CLINIC | Facility: CLINIC | Age: 61
End: 2021-03-10
Payer: COMMERCIAL

## 2021-03-10 VITALS
DIASTOLIC BLOOD PRESSURE: 79 MMHG | HEIGHT: 65 IN | HEART RATE: 67 BPM | WEIGHT: 256 LBS | SYSTOLIC BLOOD PRESSURE: 128 MMHG | BODY MASS INDEX: 42.65 KG/M2

## 2021-03-10 VITALS
TEMPERATURE: 97.8 F | WEIGHT: 260.14 LBS | HEIGHT: 65 IN | SYSTOLIC BLOOD PRESSURE: 145 MMHG | DIASTOLIC BLOOD PRESSURE: 65 MMHG | BODY MASS INDEX: 43.34 KG/M2 | HEART RATE: 65 BPM | RESPIRATION RATE: 18 BRPM | OXYGEN SATURATION: 96 %

## 2021-03-10 DIAGNOSIS — M41.9 SCOLIOSIS OF THORACOLUMBAR SPINE, UNSPECIFIED SCOLIOSIS TYPE: Primary | ICD-10-CM

## 2021-03-10 DIAGNOSIS — M54.16 ACUTE LEFT LUMBAR RADICULOPATHY: ICD-10-CM

## 2021-03-10 DIAGNOSIS — Z23 ENCOUNTER FOR IMMUNIZATION: ICD-10-CM

## 2021-03-10 DIAGNOSIS — M54.50 LOW BACK PAIN: Primary | ICD-10-CM

## 2021-03-10 PROCEDURE — 72131 CT LUMBAR SPINE W/O DYE: CPT

## 2021-03-10 PROCEDURE — G1004 CDSM NDSC: HCPCS

## 2021-03-10 PROCEDURE — 99285 EMERGENCY DEPT VISIT HI MDM: CPT | Performed by: EMERGENCY MEDICINE

## 2021-03-10 PROCEDURE — 99214 OFFICE O/P EST MOD 30 MIN: CPT | Performed by: ORTHOPAEDIC SURGERY

## 2021-03-10 PROCEDURE — 72100 X-RAY EXAM L-S SPINE 2/3 VWS: CPT

## 2021-03-10 PROCEDURE — 72128 CT CHEST SPINE W/O DYE: CPT

## 2021-03-10 PROCEDURE — 99284 EMERGENCY DEPT VISIT MOD MDM: CPT

## 2021-03-10 RX ORDER — CYCLOBENZAPRINE HCL 10 MG
10 TABLET ORAL
Qty: 20 TABLET | Refills: 0 | Status: SHIPPED | OUTPATIENT
Start: 2021-03-10

## 2021-03-10 RX ORDER — LIDOCAINE 50 MG/G
1 PATCH TOPICAL ONCE
Status: DISCONTINUED | OUTPATIENT
Start: 2021-03-10 | End: 2021-03-10 | Stop reason: HOSPADM

## 2021-03-10 RX ADMIN — LIDOCAINE 5% 1 PATCH: 700 PATCH TOPICAL at 01:51

## 2021-03-10 NOTE — PROGRESS NOTES
Assessment/Plan:  1  Scoliosis of thoracolumbar spine, unspecified scoliosis type  Ambulatory referral to Physical Therapy   2  Acute left lumbar radiculopathy  Ambulatory referral to Physical Therapy    cyclobenzaprine (FLEXERIL) 10 mg tablet       Manuel Liang has low back pain which is related to her underlying scoliosis  She does have some degenerative changes in her lumbar spine without obvious disc herniation or fracture  I do think she will do best with conservative treatment of physical therapy at this time as well as a muscle relaxer as needed  She can continue with Tylenol and avoid NSAIDs for now  We could consider a an oral steroid if she worsens but this would not be ideal given her diabetes  I will see her back in 6 weeks for repeat evaluation  Subjective:   Lyndsey Client is a 62 y o  female who presents to the office for evaluation for acute low back pain  She denies any specific injury or trauma  She has been having increased discomfort in her left lumbar paraspinal region for the past several days  The pain became quite severe and she went to the emergency room last night and had a CT scan demonstrating no fracture but showed scoliosis that was not previously identified  She states she has intermittent pain radiating into the left hip and occasionally feels pain down the leg  She took ibuprofen last night which tries not to take this due to history of gastric sleeve  Her pain this morning is slightly better  She reports an aching throbbing moderate intermittent discomfort in the left lumbar paraspinal region  She has been taking Tylenol which does not help significantly as well  She also feels radiating pain up her spine into her thoracic region  She denies any cervical radiculopathy or neck pain  Review of Systems   Constitutional: Negative for chills, fever and unexpected weight change  HENT: Negative for hearing loss, nosebleeds and sore throat      Eyes: Negative for pain, redness and visual disturbance  Respiratory: Negative for cough, shortness of breath and wheezing  Cardiovascular: Negative for chest pain, palpitations and leg swelling  Gastrointestinal: Negative for abdominal pain, nausea and vomiting  Endocrine: Negative for polydipsia and polyuria  Genitourinary: Negative for dysuria and hematuria  Musculoskeletal:        See HPI   Skin: Negative for rash and wound  Neurological: Negative for dizziness, numbness and headaches  Psychiatric/Behavioral: Negative for decreased concentration and suicidal ideas  The patient is not nervous/anxious  Past Medical History:   Diagnosis Date    Acid reflux     CKD (chronic kidney disease), stage II     Essential hypertension     Hyperlipidemia     Type 2 diabetes mellitus (HCC)        Past Surgical History:   Procedure Laterality Date    ARTHROSCOPIC REPAIR ACL Right     knee    BARIATRIC SURGERY      gastric sleeve    GALLBLADDER SURGERY      SKIN BIOPSY      scalp    TENDON REPAIR Left     ankle    TONSILLECTOMY         Family History   Problem Relation Age of Onset    Hypertension Mother     Diabetes type II Father     Diabetes type II Paternal Grandmother        Social History     Occupational History    Not on file   Tobacco Use    Smoking status: Never Smoker    Smokeless tobacco: Never Used   Substance and Sexual Activity    Alcohol use: Yes     Comment: occ       Drug use: Never    Sexual activity: Not on file         Current Outpatient Medications:     Biotin 1000 MCG CHEW, Chew 6,000 mcg, Disp: , Rfl:     Calcium 500 MG tablet, Take by mouth, Disp: , Rfl:     Cholecalciferol (VITAMIN D3 SUPER STRENGTH) 50 MCG (2000 UT) CAPS, Take 2,000 Units by mouth , Disp: , Rfl:     Continuous Blood Gluc  (FREESTYLE FRANKI 14 DAY READER) SUZY, by Does not apply route, Disp: , Rfl:     Continuous Blood Gluc Sensor (FREESTYLE FRANKI 14 DAY SENSOR) MISC, 1 Device by Does not apply route every 14 (fourteen) days, Disp: 6 each, Rfl: 3    Dapagliflozin Propanediol (Farxiga) 10 MG TABS, Take 1 tablet (10 mg total) by mouth daily, Disp: 30 tablet, Rfl: 5    lansoprazole (PREVACID) 15 mg capsule, Take 15 mg by mouth daily, Disp: , Rfl:     lisinopril (ZESTRIL) 2 5 mg tablet, Take 2 5 mg by mouth daily, Disp: , Rfl:     metFORMIN (GLUCOPHAGE-XR) 500 mg 24 hr tablet, TAKE 2 TABLETS BY MOUTH TWICE A DAY WITH FOOD, Disp: 120 tablet, Rfl: 5    Multiple Vitamins-Minerals (MULTIVITAMIN WITH MINERALS) tablet, Take 1 tablet by mouth daily, Disp: , Rfl:     nystatin (MYCOSTATIN) cream, Apply topically 2 (two) times a day, Disp: , Rfl:     Oxymetazoline HCl (RHOFADE) 1 % CREA, Apply topically, Disp: , Rfl:     Probiotic Product (ALIGN) 4 MG CAPS, Take by mouth, Disp: , Rfl:     rosuvastatin (CRESTOR) 5 mg tablet, Take 5 mg by mouth daily, Disp: , Rfl:     vitamin B-12 (VITAMIN B-12) 1,000 mcg tablet, Take 10,000 mcg by mouth daily, Disp: , Rfl:     zolpidem (AMBIEN) 5 mg tablet, Take 2 5 mg by mouth daily at bedtime as needed for sleep, Disp: , Rfl:     cyclobenzaprine (FLEXERIL) 10 mg tablet, Take 1 tablet (10 mg total) by mouth daily at bedtime, Disp: 20 tablet, Rfl: 0    mometasone (ELOCON) 0 1 % lotion, Apply topically daily, Disp: , Rfl:   No current facility-administered medications for this visit  Allergies   Allergen Reactions    Ampicillin     Darvon [Propoxyphene]     Vioxx [Rofecoxib]        Objective:  Vitals:    03/10/21 1255   BP: 128/79   Pulse: 67       Back Exam     Tenderness   The patient is experiencing tenderness in the lumbar and thoracic      Range of Motion   Extension: normal   Flexion: normal     Muscle Strength   Right Quadriceps:  5/5   Left Quadriceps:  5/5   Right Hamstrings:  5/5   Left Hamstrings:  5/5     Tests   Straight leg raise right: negative  Straight leg raise left: negative    Reflexes   Patellar: normal    Other   Sensation: normal  Gait: normal Erythema: no back redness            Physical Exam  Vitals signs reviewed  Constitutional:       Appearance: She is well-developed  HENT:      Head: Normocephalic and atraumatic  Eyes:      Conjunctiva/sclera: Conjunctivae normal       Pupils: Pupils are equal, round, and reactive to light  Neck:      Musculoskeletal: Normal range of motion and neck supple  Cardiovascular:      Rate and Rhythm: Normal rate  Pulmonary:      Effort: Pulmonary effort is normal  No respiratory distress  Skin:     General: Skin is warm and dry  Neurological:      Mental Status: She is alert and oriented to person, place, and time  Psychiatric:         Behavior: Behavior normal          I have personally reviewed pertinent films in PACS and my interpretation is as follows: Three-view x-rays of the lumbar spine dated 3/9/2021 demonstrates lumbar scoliosis with convexity to the left  No evidence of fracture  Mild degenerative changes  CT of the lumbar spine demonstrates scoliosis without evidence of fracture and multiple levels of degenerative changes

## 2021-03-10 NOTE — ED NOTES
Pt discharge instructions reviewed by provider, Pt has no further questions at this time  Pt awake and alert, no signs of acute distress noted       Asia Mart RN  03/10/21 3061

## 2021-03-10 NOTE — ED PROVIDER NOTES
History  Chief Complaint   Patient presents with    Hip Pain     pt had a L hip injury about 2 weeks while she was cleaning  Pt has been seeing a ciropractor  She woke up tonight with increased pain and was unable to bear weight on L side  Pt took 600 mg ibuprofen at 1215 tonight     Patient is a 62year old female with 2 weeks of left hip pain which got worse tonight despite tylenol and advil  No trauma  Was cleaning 2 weeks ago when pain started  No radiation of pain  No fever  No cough  No travel  No weakness or numbness  No weight loss  No rash  No incontinence  No urinary sx  Has been seeing a chiropractor  No recent old records from this ED seen on computer system  [facility] website checked on this patient and no Rx found  Does not want narcotic pain medication at this time  History provided by:  Patient (kathi)   used: No    Hip Pain  Associated symptoms: no cough, no fever and no rash        Prior to Admission Medications   Prescriptions Last Dose Informant Patient Reported? Taking?    Biotin 1000 MCG CHEW   Yes Yes   Sig: Chew 6,000 mcg   Calcium 500 MG tablet   Yes Yes   Sig: Take by mouth   Cholecalciferol (VITAMIN D3 SUPER STRENGTH) 50 MCG (2000 UT) CAPS   Yes Yes   Sig: Take 2,000 Units by mouth    Continuous Blood Gluc  (FREESTYLE FRANKI 14 DAY READER) SUZY   Yes Yes   Sig: by Does not apply route   Continuous Blood Gluc Sensor (FREESTYLE FRANKI 14 DAY SENSOR) MISC   No Yes   Si Device by Does not apply route every 14 (fourteen) days   Dapagliflozin Propanediol (Farxiga) 10 MG TABS   No Yes   Sig: Take 1 tablet (10 mg total) by mouth daily   Multiple Vitamins-Minerals (MULTIVITAMIN WITH MINERALS) tablet   Yes Yes   Sig: Take 1 tablet by mouth daily   Oxymetazoline HCl (RHOFADE) 1 % CREA   Yes Yes   Sig: Apply topically   Probiotic Product (ALIGN) 4 MG CAPS   Yes Yes   Sig: Take by mouth   lansoprazole (PREVACID) 15 mg capsule   Yes Yes   Sig: Take 15 mg by mouth daily   lisinopril (ZESTRIL) 2 5 mg tablet   Yes Yes   Sig: Take 2 5 mg by mouth daily   metFORMIN (GLUCOPHAGE-XR) 500 mg 24 hr tablet   No Yes   Sig: TAKE 2 TABLETS BY MOUTH TWICE A DAY WITH FOOD   mometasone (ELOCON) 0 1 % lotion   Yes Yes   Sig: Apply topically daily   nystatin (MYCOSTATIN) cream   Yes Yes   Sig: Apply topically 2 (two) times a day   rosuvastatin (CRESTOR) 5 mg tablet   Yes Yes   Sig: Take 5 mg by mouth daily   vitamin B-12 (VITAMIN B-12) 1,000 mcg tablet   Yes Yes   Sig: Take 10,000 mcg by mouth daily   zolpidem (AMBIEN) 5 mg tablet   Yes Yes   Sig: Take 2 5 mg by mouth daily at bedtime as needed for sleep      Facility-Administered Medications: None       Past Medical History:   Diagnosis Date    Acid reflux     CKD (chronic kidney disease), stage II     Essential hypertension     Hyperlipidemia     Type 2 diabetes mellitus (HCC)        Past Surgical History:   Procedure Laterality Date    ARTHROSCOPIC REPAIR ACL Right     knee    BARIATRIC SURGERY      gastric sleeve    GALLBLADDER SURGERY      SKIN BIOPSY      scalp    TENDON REPAIR Left     ankle    TONSILLECTOMY         Family History   Problem Relation Age of Onset    Hypertension Mother     Diabetes type II Father     Diabetes type II Paternal Grandmother      I have reviewed and agree with the history as documented  E-Cigarette/Vaping    E-Cigarette Use Never User      E-Cigarette/Vaping Substances     Social History     Tobacco Use    Smoking status: Never Smoker    Smokeless tobacco: Never Used   Substance Use Topics    Alcohol use: Yes     Comment: occ   Drug use: Never       Review of Systems   Constitutional: Negative for fever and unexpected weight change  Respiratory: Negative for cough  Genitourinary: Negative for difficulty urinating  Musculoskeletal: Positive for back pain  Skin: Negative for rash  Neurological: Negative for weakness and numbness     All other systems reviewed and are negative  Physical Exam  Physical Exam  Vitals signs and nursing note reviewed  Constitutional:       General: She is in acute distress (moderate)  HENT:      Head: Normocephalic and atraumatic  Mouth/Throat:      Comments: Mask in place  Eyes:      General: No scleral icterus  Neck:      Musculoskeletal: Normal range of motion and neck supple  Cardiovascular:      Rate and Rhythm: Normal rate and regular rhythm  Heart sounds: Normal heart sounds  No murmur  Pulmonary:      Effort: Pulmonary effort is normal  No respiratory distress  Breath sounds: Normal breath sounds  Abdominal:      General: Bowel sounds are normal       Palpations: Abdomen is soft  Tenderness: There is no abdominal tenderness  Musculoskeletal:         General: Tenderness (left paravertebral lumbar tenderness  No midline tenderness) present  No deformity  Right lower leg: No edema  Left lower leg: No edema  Skin:     General: Skin is warm and dry  Findings: No erythema or rash  Neurological:      General: No focal deficit present  Mental Status: She is alert and oriented to person, place, and time  Sensory: No sensory deficit  Motor: No weakness     Psychiatric:         Mood and Affect: Mood normal          Vital Signs  ED Triage Vitals [03/10/21 0137]   Temperature Pulse Respirations Blood Pressure SpO2   97 8 °F (36 6 °C) 82 18 (!) 187/86 98 %      Temp Source Heart Rate Source Patient Position - Orthostatic VS BP Location FiO2 (%)   Oral Monitor Sitting Right arm --      Pain Score       4           Vitals:    03/10/21 0137 03/10/21 0308   BP: (!) 187/86 145/65   Pulse: 82 65   Patient Position - Orthostatic VS: Sitting Lying         Visual Acuity      ED Medications  Medications   lidocaine (LIDODERM) 5 % patch 1 patch (1 patch Topical Medication Applied 3/10/21 0151)       Diagnostic Studies  Results Reviewed     None                 CT thoracic spine without contrast ED Interpretation by Sheba Scott MD (03/10 0310)   FINDINGS:     ALIGNMENT:  There is mild convexity of the thoracic spine to the right  No subluxation      VERTEBRAL BODIES: No fracture      DEGENERATIVE CHANGES:  Mild multilevel degenerative disc disease      PARASPINAL SOFT TISSUES: Normal      IMPRESSION:     No acute thoracic or lumbar spine fracture or subluxation      Mild multilevel spondylotic degenerative changes of the thoracic and lumbar spine with no critical spinal canal stenosis  If symptoms of radiculopathy persist, a nonemergent MRI could be performed for further evaluation            Workstation performed: [id number]      Final Result by Elvi Cordero MD (03/10 2964)      No acute thoracic or lumbar spine fracture or subluxation  Mild multilevel spondylotic degenerative changes of the thoracic and lumbar spine with no critical spinal canal stenosis  If symptoms of radiculopathy persist, a nonemergent MRI could be performed for further evaluation  Workstation performed: [id number]         CT lumbar spine without contrast   ED Interpretation by Sheba Scott MD (03/10 0310)   FINDINGS:     ALIGNMENT:  There is mild convexity of the thoracic spine to the right  No subluxation      VERTEBRAL BODIES: No fracture      DEGENERATIVE CHANGES:  Mild multilevel degenerative disc disease      PARASPINAL SOFT TISSUES: Normal      IMPRESSION:     No acute thoracic or lumbar spine fracture or subluxation      Mild multilevel spondylotic degenerative changes of the thoracic and lumbar spine with no critical spinal canal stenosis  If symptoms of radiculopathy persist, a nonemergent MRI could be performed for further evaluation            Workstation performed: [id number]      Final Result by Elvi Cordero MD (03/10 4269)      No acute thoracic or lumbar spine fracture or subluxation        Mild multilevel spondylotic degenerative changes of the thoracic and lumbar spine with no critical spinal canal stenosis  If symptoms of radiculopathy persist, a nonemergent MRI could be performed for further evaluation  Workstation performed: [id number]         XR lumbar spine 2 or 3 views   ED Interpretation by Darylene Knee, MD (03/10 9768)   BELLED, scoliosis, T 11 compression deformity read by me  Procedures  Procedures         ED Course  ED Course as of Mar 10 0344   Wed Mar 10, 2021   0214 X-ray d/w patient and fiance with patient's permission and CT ordered since plain x-ray is abnormal        0343 CTs d/w patient and fiance  Patient declines Rx for pain  SBIRT 20yo+      Most Recent Value   SBIRT (22 yo +)   In order to provide better care to our patients, we are screening all of our patients for alcohol and drug use  Would it be okay to ask you these screening questions? Yes Filed at: 03/10/2021 0139   Initial Alcohol Screen: US AUDIT-C    1  How often do you have a drink containing alcohol? 1 Filed at: 03/10/2021 0139   2  How many drinks containing alcohol do you have on a typical day you are drinking? 0 Filed at: 03/10/2021 0139   3a  Male UNDER 65: How often do you have five or more drinks on one occasion? 0 Filed at: 03/10/2021 0139   3b  FEMALE Any Age, or MALE 65+: How often do you have 4 or more drinks on one occassion? 0 Filed at: 03/10/2021 0139   Audit-C Score  1 Filed at: 03/10/2021 0139   JANETH: How many times in the past year have you    Used an illegal drug or used a prescription medication for non-medical reasons? Never Filed at: 03/10/2021 0139                    MDM  Number of Diagnoses or Management Options  Diagnosis management comments: DDx including but not limited to: sciatica, herniated disc, arthritis, spinal stenosis, strain, sprain; doubt fracture, cauda equina syndrome, epidural abscess, AAA          Amount and/or Complexity of Data Reviewed  Tests in the radiology section of CPT®: ordered and reviewed  Decide to obtain previous medical records or to obtain history from someone other than the patient: yes  Obtain history from someone other than the patient: yes  Independent visualization of images, tracings, or specimens: yes        Disposition  Final diagnoses:   Low back pain     Time reflects when diagnosis was documented in both MDM as applicable and the Disposition within this note     Time User Action Codes Description Comment    3/10/2021  3:43 AM Emmy Lopez Vicente [M54 5] Low back pain       ED Disposition     ED Disposition Condition Date/Time Comment    Discharge Stable Wed Mar 10, 2021  3:43 AM Lidiarenata Leena discharge to home/self care  Follow-up Information     Follow up With Specialties Details Why Contact Info Additional [address] [facility] Call in 1 day Can use over the counter lidocaine patches or voltaren gel for pain  Return sooner if increased pain, numbness, weakness, fever, vomiting, incontinence, difficulty urinating, rash  [facility] [place] [address] [id number]  [address] [facility] [address] [place], [place], [address] [phone]          Patient's Medications   Discharge Prescriptions    No medications on file     No discharge procedures on file      PDMP Review       Value Time User    PDMP Reviewed  Yes 3/10/2021  1:38 AM Darylene Knee, MD          ED Provider  Electronically Signed by           Darylene Knee, MD  03/10/21 7306

## 2021-03-16 ENCOUNTER — EVALUATION (OUTPATIENT)
Dept: PHYSICAL THERAPY | Facility: CLINIC | Age: 61
End: 2021-03-16
Payer: COMMERCIAL

## 2021-03-16 DIAGNOSIS — M41.9 SCOLIOSIS OF THORACOLUMBAR SPINE, UNSPECIFIED SCOLIOSIS TYPE: ICD-10-CM

## 2021-03-16 DIAGNOSIS — M54.16 ACUTE LEFT LUMBAR RADICULOPATHY: ICD-10-CM

## 2021-03-16 PROCEDURE — 97161 PT EVAL LOW COMPLEX 20 MIN: CPT | Performed by: PHYSICAL THERAPIST

## 2021-03-16 NOTE — PROGRESS NOTES
PT Evaluation     Today's date: 3/16/2021  Patient name: Mata Lilly  : 1960  MRN: [id number]  Referring provider: Shukri Natarajan DO  Dx:   Encounter Diagnosis     ICD-10-CM    1  Scoliosis of thoracolumbar spine, unspecified scoliosis type  M41 9 Ambulatory referral to Physical Therapy   2  Acute left lumbar radiculopathy  M54 16 Ambulatory referral to Physical Therapy                  Assessment  Assessment details: Mata Lilly is a 62 y o  female who presents to the clinic with complaints of lower back pain  The patient presents with the above listed impairments  She notes increased pain and hesitation with movements of the lumbar spine, particularly with flexion and lifting movements  She is very eager to increase her functional performance and should benefit from skilled physical therapy  Thank you for the referral       Impairments: abnormal muscle firing, abnormal or restricted ROM, activity intolerance, impaired physical strength, lacks appropriate home exercise program, pain with function, poor posture  and poor body mechanics  Understanding of Dx/Px/POC: good   Prognosis: good    Goals  Impairment Goals:  1  Patient will decrease complaints of pain by 50% at worst in 4 weeks  2  Patient will increase lumbar ROM to full in 4 weeks  3  Patient will increase strength to 5/5 in 4 weeks    Functional Goals:  1  Patient will be independent with HEP by discharge  2  Patient will increase FOTO to average norms by discharge  3  Patient will be able to pick a box up off of the floor with decreased pain in 4 weeks  4    Patient will return to prior level of function by discharge         Plan  Patient would benefit from: skilled physical therapy  Planned modality interventions: cryotherapy, thermotherapy: hydrocollator packs and TENS  Planned therapy interventions: home exercise program, graded exercise, functional ROM exercises, flexibility, body mechanics training, postural training, patient education, therapeutic activities, therapeutic exercise, manual therapy, joint mobilization and neuromuscular re-education  Frequency: 2x week  Duration in weeks: 4  Treatment plan discussed with: patient        Subjective Evaluation    History of Present Illness  Mechanism of injury: HPI:  Patient reports pain starting several weeks ago after cleaning at home, noting lower back pain  She noted that as the days progressed, her pain got worse  She noted back spasms that were so bad that she ended up going to the ER  She has seen Dr Kisha Will where she was put on muscle relaxors which she notes has been helping to great effect  She was then referred to physical therapy  Pain Location:  Lower lumbar spine, thoracic spine   Occupation:  Works in Travel   Prior Functional Limitations: Independent prior   AGG:  Picking up items off of the ground, reaching, lifting overhead  Ease:  Muscle Relaxors, Heat    Patient Goals:  "I want to strengthen the muscles around the spine"     Pain  Current pain ratin  At best pain ratin  At worst pain ratin  Quality: sharp ("spasms")          Objective     Tenderness     Additional Tenderness Details  Very slight tenderness to palpation left lumbar paraspinals     Active Range of Motion     Additional Active Range of Motion Details  Lumbar ROM - Full w/ slight apprehension  Lumbar Extension - 50% w/ relief  Side bend - Full w/ slight apprehension     Strength/Myotome Testing     Left Hip   Planes of Motion   Flexion: 5  Extension: 4  Abduction: 4    Right Hip   Planes of Motion   Flexion: 5  Extension: 4  Abduction: 4    Left Knee   Extension: 5    Right Knee   Extension: 5    Left Ankle/Foot   Dorsiflexion: 5    Right Ankle/Foot   Dorsiflexion: 5    Muscle Activation   Patient able to activate left transverse abdominals, left multifidus, right transverse abdominals and right multifidus       Additional Muscle Activation Details  Moderate cueing needed for activation of transverse abdominals and multifidi     Tests     Lumbar     Left   Negative crossed SLR, passive SLR and slump test      Right   Negative crossed SLR, passive SLR and slump test      Functional Assessment        Comments  Patient notes discomfort in her upper back, notes decreased posture and increased discomfort due to sitting for work primarily all day               Diagnosis:    Precautions:    Manuals        PROM        Mobs                                There Ex        Lumbar 3 way        Lumbar Extension        Ball on Wall                                 Neruo Re-Ed        TrA Sets        Multifidus Activation        Multifidus Press        Clamshells        SL Hip ABD        X-Walks        Bridges                Rows        Shld Ext        Hitchers                               Ther Act                                         Modalities             CP PRN

## 2021-03-23 ENCOUNTER — OFFICE VISIT (OUTPATIENT)
Dept: PHYSICAL THERAPY | Facility: CLINIC | Age: 61
End: 2021-03-23
Payer: COMMERCIAL

## 2021-03-23 DIAGNOSIS — M41.9 SCOLIOSIS OF THORACOLUMBAR SPINE, UNSPECIFIED SCOLIOSIS TYPE: Primary | ICD-10-CM

## 2021-03-23 DIAGNOSIS — M54.16 ACUTE LEFT LUMBAR RADICULOPATHY: ICD-10-CM

## 2021-03-23 PROCEDURE — 97112 NEUROMUSCULAR REEDUCATION: CPT | Performed by: PHYSICAL THERAPIST

## 2021-03-23 NOTE — PROGRESS NOTES
Daily Note     Today's date: 3/23/2021  Patient name: Nathan Mondragon  : 1960  MRN: [id number]  Referring provider: Lovely Abarca DO  Dx:   Encounter Diagnosis     ICD-10-CM    1  Scoliosis of thoracolumbar spine, unspecified scoliosis type  M41 9    2  Acute left lumbar radiculopathy  M54 16                   Subjective: "I helped my daughter move and unpack  It felt OK  I did try to clean my car and I reached and I could feel a sharp pain, but it went away"       Objective: See treatment diary below      Assessment: Tolerated treatment well  Patient would benefit from continued PT  Patient did very well with her first physical therapy visit  Provided with an updated HEP with focus on postural exercises as well as core strength and stabilization  Patient will attempt these exercises over the next week and we will progress her at her next visit  Plan: Progress treatment as tolerated         Diagnosis:    Precautions:    Manuals 3/23       PROM        Mobs                                There Ex        Lumbar 3 way 10x ea       Lumbar Extension        Ball on Wall  1 min                               Neruo Re-Ed        TrA Sets        Multifidus Activation        Multifidus Press        Clamshells Red 2x10       SL Hip ABD        X-Walks        Bridges 1x15               Rows Red 2x10       Shld Ext        Hitchers Red 2x10                              Ther Act                                         Modalities             CP PRN

## 2021-03-30 ENCOUNTER — OFFICE VISIT (OUTPATIENT)
Dept: PHYSICAL THERAPY | Facility: CLINIC | Age: 61
End: 2021-03-30
Payer: COMMERCIAL

## 2021-03-30 DIAGNOSIS — M54.16 ACUTE LEFT LUMBAR RADICULOPATHY: ICD-10-CM

## 2021-03-30 DIAGNOSIS — M41.9 SCOLIOSIS OF THORACOLUMBAR SPINE, UNSPECIFIED SCOLIOSIS TYPE: Primary | ICD-10-CM

## 2021-03-30 PROCEDURE — 97112 NEUROMUSCULAR REEDUCATION: CPT | Performed by: PHYSICAL THERAPIST

## 2021-03-30 NOTE — PROGRESS NOTES
Daily Note     Today's date: 3/30/2021  Patient name: Zenon Balderas  : 1960  MRN: [id number]  Referring provider: Tino Celeste DO  Dx:   Encounter Diagnosis     ICD-10-CM    1  Scoliosis of thoracolumbar spine, unspecified scoliosis type  M41 9    2  Acute left lumbar radiculopathy  M54 16                   Subjective: "My back has been feeling pretty good  It's a lot of my posture that I know I need to work on"       Objective: See treatment diary below      Assessment: Tolerated treatment well  Patient would benefit from continued PT  Patient doing well  Noting less back pain overall, but still notes issues with her thoracic spine and posture  Able to demonstrate good technique with new PREs today  HEP updated  Continue to progress as able  Plan: Progress treatment as tolerated         Diagnosis:    Precautions:    Manuals 3/23 3/30      PROM        Mobs                                There Ex        Lumbar 3 way 10x ea       Lumbar Extension        Ball on Wall  1 min                               Neruo Re-Ed        TrA Sets        Multifidus Activation        Multifidus Press  [place] 10# x10 ea      Clamshells Red 2x10       SL Hip ABD        X-Walks        Bridges 1x15               Rows Red 2x10       Shld Ext  [place] 10# 2x10      Hitchers Red 2x10       Wall climbs  Red 1x5      Scap Stab w/ Tband   Red 1x5      Bent over rows  2# x10 ea      Bent over H ABD  2# x10 ea                     Ther Act                                         Modalities             CP PRN

## 2021-04-06 ENCOUNTER — APPOINTMENT (OUTPATIENT)
Dept: PHYSICAL THERAPY | Facility: CLINIC | Age: 61
End: 2021-04-06
Payer: COMMERCIAL

## 2021-04-07 ENCOUNTER — OFFICE VISIT (OUTPATIENT)
Dept: PHYSICAL THERAPY | Facility: CLINIC | Age: 61
End: 2021-04-07
Payer: COMMERCIAL

## 2021-04-07 DIAGNOSIS — M41.9 SCOLIOSIS OF THORACOLUMBAR SPINE, UNSPECIFIED SCOLIOSIS TYPE: Primary | ICD-10-CM

## 2021-04-07 DIAGNOSIS — M54.16 ACUTE LEFT LUMBAR RADICULOPATHY: ICD-10-CM

## 2021-04-07 PROCEDURE — 97112 NEUROMUSCULAR REEDUCATION: CPT | Performed by: PHYSICAL THERAPIST

## 2021-04-07 NOTE — PROGRESS NOTES
Daily Note     Today's date: 2021  Patient name: Helga Ortega  : 1960  MRN: [id number]  Referring provider: Seth Hwang DO  Dx:   Encounter Diagnosis     ICD-10-CM    1  Scoliosis of thoracolumbar spine, unspecified scoliosis type  M41 9    2  Acute left lumbar radiculopathy  M54 16                   Subjective: "I'm definitely sore today"       Objective: See treatment diary below      Assessment: Tolerated treatment well  Patient would benefit from continued PT  Patient noted increased work over the weekend leading to her increased soreness  Patient noting that she is no longer have spasms and that her pain is priamrily more soreness than sharp  Able to tolerate all new exercises today  Re-evaluation at next visit  Plan: Progress treatment as tolerated         Diagnosis:    Precautions:    Manuals 3/23 3/30 4/7     PROM        Mobs                                There Ex        Lumbar 3 way 10x ea       Lumbar Extension        Ball on Wall  1 min       Prone on Elbows   10" x 10                     Neruo Re-Ed        TrA Sets        Multifidus Activation   10" x 5     Multifidus Press  [place] 10# x10 ea      Clamshells Red 2x10       SL Hip ABD        X-Walks        Bridges 1x15               Rows Red 2x10       Shld Ext  [place] 10# 2x10      Hitchers Red 2x10       Wall climbs  Red 1x5      Scap Stab w/ Tband   Red 1x5      Bent over rows  2# x10 ea      Bent over H ABD  2# x10 ea      Bent over scaption    2# x10 ea     Bent over lats   2# x10 ea     SL ER   2# x10 ea     Serratus Punch   2# 2x10                                    Ther Act                                         Modalities             CP PRN

## 2021-04-13 ENCOUNTER — OFFICE VISIT (OUTPATIENT)
Dept: PHYSICAL THERAPY | Facility: CLINIC | Age: 61
End: 2021-04-13
Payer: COMMERCIAL

## 2021-04-13 DIAGNOSIS — M54.16 ACUTE LEFT LUMBAR RADICULOPATHY: ICD-10-CM

## 2021-04-13 DIAGNOSIS — M41.9 SCOLIOSIS OF THORACOLUMBAR SPINE, UNSPECIFIED SCOLIOSIS TYPE: Primary | ICD-10-CM

## 2021-04-13 PROCEDURE — 97112 NEUROMUSCULAR REEDUCATION: CPT | Performed by: PHYSICAL THERAPIST

## 2021-04-13 NOTE — PROGRESS NOTES
Daily Note     Today's date: 2021  Patient name: Richarda Kawasaki  : 1960  MRN: [id number]  Referring provider: Silvia Caruso DO  Dx:   Encounter Diagnosis     ICD-10-CM    1  Scoliosis of thoracolumbar spine, unspecified scoliosis type  M41 9    2  Acute left lumbar radiculopathy  M54 16                   Subjective: "I did a lot of mulching over the weekend  I see Dr Keren Rg next week"       Objective: See treatment diary below      Assessment: Tolerated treatment well  Patient would benefit from continued PT  Patient notes that she has had some discomfort in her back from increased housework over the weekend  Continuing to focus on strengthening of her core as well as mid to lower back to help with activation and support  Patient will be re-evaluated at next visit  Plan: Progress treatment as tolerated         Diagnosis:    Precautions:    Manuals 3/23 3/30 4/7 4/13    PROM        Mobs        IASTM    B/L paraspinals                     There Ex        Lumbar 3 way 10x ea       Lumbar Extension        Ball on Wall  1 min       Prone on Elbows   10" x 10 10" x 10                    Neruo Re-Ed        TrA Sets        Multifidus Activation   10" x 5 W/ OSB and curls x15    Multifidus Press  [place] 10# x10 ea      Clamshells Red 2x10       SL Hip ABD        X-Walks        Bridges 1x15   x10  OSB 2x10            Rows Red 2x10       Shld Ext  [place] 10# 2x10      Hitchers Red 2x10       Wall climbs  Red 1x5      Scap Stab w/ Tband   Red 1x5      Bent over rows  2# x10 ea      Bent over H ABD  2# x10 ea      Bent over scaption    2# x10 ea     Bent over lats   2# x10 ea     SL ER   2# x10 ea     Serratus Punch   2# 2x10     Prone Hip Ext    x10 ea    Prone Swimmer    x10 ea    Superman     x10                                   Ther Act                                         Modalities             CP PRN

## 2021-04-19 DIAGNOSIS — E11.65 TYPE 2 DIABETES MELLITUS WITH HYPERGLYCEMIA, WITHOUT LONG-TERM CURRENT USE OF INSULIN (HCC): ICD-10-CM

## 2021-04-19 RX ORDER — DAPAGLIFLOZIN 10 MG/1
TABLET, FILM COATED ORAL
Qty: 30 TABLET | Refills: 5 | Status: SHIPPED | OUTPATIENT
Start: 2021-04-19 | End: 2021-11-15

## 2021-04-20 ENCOUNTER — OFFICE VISIT (OUTPATIENT)
Dept: PHYSICAL THERAPY | Facility: CLINIC | Age: 61
End: 2021-04-20
Payer: COMMERCIAL

## 2021-04-20 DIAGNOSIS — M54.16 ACUTE LEFT LUMBAR RADICULOPATHY: ICD-10-CM

## 2021-04-20 DIAGNOSIS — M41.9 SCOLIOSIS OF THORACOLUMBAR SPINE, UNSPECIFIED SCOLIOSIS TYPE: Primary | ICD-10-CM

## 2021-04-20 PROCEDURE — 97112 NEUROMUSCULAR REEDUCATION: CPT

## 2021-04-20 NOTE — PROGRESS NOTES
Daily Note     Today's date: 2021  Patient name: Arturo Araujo  : 1960  MRN: [id number]  Referring provider: Marcelo Wasserman DO  Dx:   Encounter Diagnosis     ICD-10-CM    1  Scoliosis of thoracolumbar spine, unspecified scoliosis type  M41 9    2  Acute left lumbar radiculopathy  M54 16                   Subjective: Patient reports, "I don't have pain in my lower back anymore, if I do it's a little  It goes away when I sleep, it is my upper back now maybe from my posture "      Objective: See treatment diary below      Assessment: Tolerated treatment well  Patient exhibited good technique with therapeutic exercises  Patient is making progress in therapy, pain is mostly in upper back which she attributes may be d/t posture  Able to challenge patient with prone W's and eccentric wall clocks to strengthen upper back  Patient notes her lower back pain has improved  Plan: Progress treatment as tolerated         Diagnosis:    Precautions:    Manuals  3/30 4/7 4/13 4/20   PROM        Mobs        IASTM    B/L paraspinals                     There Ex        Lumbar 3 way        Lumbar Extension        Ball on Wall         Prone on Elbows   10" x 10 10" x 10 10" x10                   Neruo Re-Ed        TrA Sets        Multifidus Activation   10" x 5 W/ OSB and curls x15    Multifidus Press  [place] 10# x10 ea      Clamshells        SL Hip ABD        X-Walks        Bridges    x10  OSB 2x10 PSB 10x           Rows     Green 2x10   Shld Ext  [place] 10# 2x10   Green 2x10    Hitchers        Wall climbs  Red 1x5      Wall clock     Red 9&3 only  Eccentric    Scap Stab w/ Tband   Red 1x5      Bent over rows  2# x10 ea      Bent over H ABD  2# x10 ea      Bent over scaption    2# x10 ea     Bent over lats   2# x10 ea     SL ER   2# x10 ea     Serratus Punch   2# 2x10     W + scap depression     10x   Prone Hip Ext    x10 ea 10x ea   Prone Swimmer    x10 ea 10x   Superman     x10 10x Ther Act                                      Modalities             CP PRN                                      Patient treated by JASPER Floyd, under direct supervision of Vargas Glez PTA

## 2021-04-22 ENCOUNTER — OFFICE VISIT (OUTPATIENT)
Dept: OBGYN CLINIC | Facility: CLINIC | Age: 61
End: 2021-04-22
Payer: COMMERCIAL

## 2021-04-22 VITALS
HEART RATE: 69 BPM | BODY MASS INDEX: 42.32 KG/M2 | WEIGHT: 254 LBS | SYSTOLIC BLOOD PRESSURE: 139 MMHG | DIASTOLIC BLOOD PRESSURE: 82 MMHG | HEIGHT: 65 IN

## 2021-04-22 DIAGNOSIS — S46.819A STRAIN OF TRAPEZIUS MUSCLE, UNSPECIFIED LATERALITY, INITIAL ENCOUNTER: ICD-10-CM

## 2021-04-22 DIAGNOSIS — M41.9 SCOLIOSIS OF THORACOLUMBAR SPINE, UNSPECIFIED SCOLIOSIS TYPE: Primary | ICD-10-CM

## 2021-04-22 PROCEDURE — 99214 OFFICE O/P EST MOD 30 MIN: CPT | Performed by: ORTHOPAEDIC SURGERY

## 2021-04-22 NOTE — PROGRESS NOTES
Assessment/Plan:  1  Scoliosis of thoracolumbar spine, unspecified scoliosis type  Ambulatory referral to Physical Therapy   2  Strain of trapezius muscle, unspecified laterality, initial encounter  Ambulatory referral to Physical Therapy       Anuja Maciel has improvement of her low back but is now experiencing some upper back discomfort  She does have pain more consistent with trapezius spasm  We had a discussion about changes to her work station and better [employer] which can decrease some of her stress in her back  I also think that her scoliosis is slightly tied to this type of recurrent pain  I do think physical therapy is the best treatment option for her at this time we will switch her therapy to be more focused on her thoracic spine rather than the lumbar spine  She was advised to continue with her exercises that she learned for the lumbar spine to prevent recurrence of pain  Follow-up after therapy if pain persists  Subjective:   Massiel Kimbrough is a 62 y o  female who presents To the office for follow-up for low back pain and lumbar scoliosis  I last saw her 6 weeks ago we identified a slight scoliosis on x-ray and start her in formal physical therapy  She states that physical therapy has significantly helped and her back pain feels much better  She feels about 95% improvement and does not have any low back pain today  She states that over the last 2 weeks she has developed more discomfort in her upper back  She has pain around her shoulders and upper thoracic region  She states that this pain seems to worsen when she is working  She is hunched at a computer and her arms or up on the table using a keyboard most of the day  She denies any injury or trauma  She denies any numbness or tingling down her arms  She denies any pain into her neck  She has improved with anti-inflammatories and muscle relaxers        Review of Systems   Constitutional: Negative for chills, fever and unexpected weight change  HENT: Negative for hearing loss, nosebleeds and sore throat  Eyes: Negative for pain, redness and visual disturbance  Respiratory: Negative for cough, shortness of breath and wheezing  Cardiovascular: Negative for chest pain, palpitations and leg swelling  Gastrointestinal: Negative for abdominal pain, nausea and vomiting  Endocrine: Negative for polydipsia and polyuria  Genitourinary: Negative for dysuria and hematuria  Musculoskeletal:        See HPI   Skin: Negative for rash and wound  Neurological: Negative for dizziness, numbness and headaches  Psychiatric/Behavioral: Negative for decreased concentration and suicidal ideas  The patient is not nervous/anxious  Past Medical History:   Diagnosis Date    Acid reflux     CKD (chronic kidney disease), stage II     Essential hypertension     Hyperlipidemia     Type 2 diabetes mellitus (HCC)        Past Surgical History:   Procedure Laterality Date    ARTHROSCOPIC REPAIR ACL Right     knee    BARIATRIC SURGERY      gastric sleeve    GALLBLADDER SURGERY      SKIN BIOPSY      scalp    TENDON REPAIR Left     ankle    TONSILLECTOMY         Family History   Problem Relation Age of Onset    Hypertension Mother     Diabetes type II Father     Diabetes type II Paternal Grandmother        Social History     Occupational History    Not on file   Tobacco Use    Smoking status: Never Smoker    Smokeless tobacco: Never Used   Substance and Sexual Activity    Alcohol use: Yes     Comment: occ       Drug use: Never    Sexual activity: Not on file         Current Outpatient Medications:     Biotin 1000 MCG CHEW, Chew 6,000 mcg, Disp: , Rfl:     Calcium 500 MG tablet, Take by mouth, Disp: , Rfl:     Cholecalciferol (VITAMIN D3 SUPER STRENGTH) 50 MCG (2000 UT) CAPS, Take 2,000 Units by mouth , Disp: , Rfl:     Continuous Blood Gluc  (FREESTYLE FRANKI 14 DAY READER) SUZY, by Does not apply route, Disp: , Rfl:    Continuous Blood Gluc Sensor (FREESTYLE FRANKI 14 DAY SENSOR) MISC, 1 Device by Does not apply route every 14 (fourteen) days, Disp: 6 each, Rfl: 3    cyclobenzaprine (FLEXERIL) 10 mg tablet, Take 1 tablet (10 mg total) by mouth daily at bedtime, Disp: 20 tablet, Rfl: 0    Farxiga 10 MG TABS, TAKE 1 TABLET BY MOUTH EVERY DAY, Disp: 30 tablet, Rfl: 5    lansoprazole (PREVACID) 15 mg capsule, Take 15 mg by mouth daily, Disp: , Rfl:     lisinopril (ZESTRIL) 2 5 mg tablet, Take 2 5 mg by mouth daily, Disp: , Rfl:     metFORMIN (GLUCOPHAGE-XR) 500 mg 24 hr tablet, TAKE 2 TABLETS BY MOUTH TWICE A DAY WITH FOOD, Disp: 120 tablet, Rfl: 5    Multiple Vitamins-Minerals (MULTIVITAMIN WITH MINERALS) tablet, Take 1 tablet by mouth daily, Disp: , Rfl:     nystatin (MYCOSTATIN) cream, Apply topically 2 (two) times a day, Disp: , Rfl:     Oxymetazoline HCl (RHOFADE) 1 % CREA, Apply topically, Disp: , Rfl:     Probiotic Product (ALIGN) 4 MG CAPS, Take by mouth, Disp: , Rfl:     rosuvastatin (CRESTOR) 5 mg tablet, Take 5 mg by mouth daily, Disp: , Rfl:     vitamin B-12 (VITAMIN B-12) 1,000 mcg tablet, Take 10,000 mcg by mouth daily, Disp: , Rfl:     zolpidem (AMBIEN) 5 mg tablet, Take 2 5 mg by mouth daily at bedtime as needed for sleep, Disp: , Rfl:     mometasone (ELOCON) 0 1 % lotion, Apply topically daily, Disp: , Rfl:     Allergies   Allergen Reactions    Ampicillin     Darvon [Propoxyphene]     Vioxx [Rofecoxib]        Objective:  Vitals:    04/22/21 0802   BP: 139/82   Pulse: 69       Back Exam     Tenderness   Back tenderness location: Mild tenderness to palpation over bilateral trapezius musculature  No midline tenderness  Muscle Strength   Right Quadriceps:  5/5   Left Quadriceps:  5/5   Right Hamstrings:  5/5   Left Hamstrings:  5/5     Other   Heel walk: normal  Sensation: normal  Gait: normal             Physical Exam  Vitals signs reviewed  Constitutional:       Appearance: She is well-developed  HENT:      Head: Normocephalic and atraumatic  Eyes:      Conjunctiva/sclera: Conjunctivae normal       Pupils: Pupils are equal, round, and reactive to light  Neck:      Musculoskeletal: Normal range of motion and neck supple  Cardiovascular:      Rate and Rhythm: Normal rate  Pulmonary:      Effort: Pulmonary effort is normal  No respiratory distress  Skin:     General: Skin is warm and dry  Neurological:      Mental Status: She is alert and oriented to person, place, and time     Psychiatric:         Behavior: Behavior normal

## 2021-04-27 ENCOUNTER — APPOINTMENT (OUTPATIENT)
Dept: PHYSICAL THERAPY | Facility: CLINIC | Age: 61
End: 2021-04-27
Payer: COMMERCIAL

## 2021-05-12 NOTE — PROGRESS NOTES
PT Discharge    Today's date: 2021  Patient name: Roni Porter  : 1960  MRN: [id number]  Referring provider: Deneen Stahl DO  Dx:   Encounter Diagnosis     ICD-10-CM    1  Scoliosis of thoracolumbar spine, unspecified scoliosis type  M41 9    2  Acute left lumbar radiculopathy  M54 16      Patient had returned to see Dr Lorena Ibrahim for follow-up for upper back pain  At this time, she has not returned for any further physical therapy sessions  Due to this, she will be discharged from physical therapy services  If Yan Olson were to need physical therapy in the future, we would be happy to share in her care

## 2021-05-17 ENCOUNTER — TELEPHONE (OUTPATIENT)
Dept: OBGYN CLINIC | Facility: HOSPITAL | Age: 61
End: 2021-05-17

## 2021-05-17 DIAGNOSIS — M17.31 POST-TRAUMATIC OSTEOARTHRITIS OF RIGHT KNEE: Primary | ICD-10-CM

## 2021-05-17 NOTE — TELEPHONE ENCOUNTER
Patient sees Dr Nik Martínez  Patient would like another visco injection for her right knee  Can this order be placed?       CB: [phone]

## 2021-05-26 ENCOUNTER — OFFICE VISIT (OUTPATIENT)
Dept: OBGYN CLINIC | Facility: CLINIC | Age: 61
End: 2021-05-26
Payer: COMMERCIAL

## 2021-05-26 VITALS
SYSTOLIC BLOOD PRESSURE: 122 MMHG | WEIGHT: 257 LBS | DIASTOLIC BLOOD PRESSURE: 80 MMHG | BODY MASS INDEX: 42.82 KG/M2 | HEIGHT: 65 IN | HEART RATE: 84 BPM

## 2021-05-26 DIAGNOSIS — M25.561 CHRONIC PAIN OF RIGHT KNEE: Primary | ICD-10-CM

## 2021-05-26 DIAGNOSIS — G89.29 CHRONIC PAIN OF RIGHT KNEE: Primary | ICD-10-CM

## 2021-05-26 DIAGNOSIS — M17.31 POST-TRAUMATIC OSTEOARTHRITIS OF RIGHT KNEE: ICD-10-CM

## 2021-05-26 PROCEDURE — 99214 OFFICE O/P EST MOD 30 MIN: CPT | Performed by: ORTHOPAEDIC SURGERY

## 2021-05-26 PROCEDURE — 20610 DRAIN/INJ JOINT/BURSA W/O US: CPT | Performed by: ORTHOPAEDIC SURGERY

## 2021-05-26 NOTE — PROGRESS NOTES
Assessment/Plan:  1  Chronic pain of right knee     2  Post-traumatic osteoarthritis of right knee        Patient seen and examined here today for follow-up regarding her posttraumatic arthritis of her right knee associated activity-related knee pain  She has been getting good relief from her viscosupplementation injections in the form of Synvisc-One  She is here to pursue repeat viscosupplementation injections  She tolerated the Synvisc-One injection well today  Post-injection instructions were provided  I will see her back in approximately 6 months time to reconsider viscosupplementation therapy    Large joint arthrocentesis: R knee  Universal Protocol:  Consent: Verbal consent obtained  Consent given by: patient  Time out: Immediately prior to procedure a "time out" was called to verify the correct patient, procedure, equipment, support staff and site/side marked as required  Patient understanding: patient states understanding of the procedure being performed  Site marked: the operative site was marked  Supporting Documentation  Indications: pain   Procedure Details  Location: knee - R knee  Preparation: Patient was prepped and draped in the usual sterile fashion  Needle size: 20 G  Ultrasound guidance: no  Approach: Superior lateral   Medications administered: 48 mg hylan 48 MG/6ML    Patient tolerance: patient tolerated the procedure well with no immediate complications  Dressing:  Sterile dressing applied         Subjective: Follow-up right knee pain    Patient ID: Kristian Vogel is a 62 y o  female  HPI  Patient here for follow-up regarding her activity related right knee pain  She has been getting significant relief from her Synvisc-One injections  Her last injection in November provided greater than 5 months worth of relief of her activity-related pain  Over the last month and a half her activity-related pain has continued to return  Is 8/10 on the pain scale  It is diffuse in the right knee  Is exacerbated with activity  She is aware that she will need a knee replacement in the future and like to pursue this at some point next year  She is getting  in October and wants to delay to at least 2022  Review of Systems   Constitutional: Positive for activity change  HENT: Negative  Eyes: Negative  Respiratory: Negative  Cardiovascular: Negative  Gastrointestinal: Negative  Endocrine: Negative  Musculoskeletal: Positive for arthralgias  Skin: Negative  Neurological: Negative  Psychiatric/Behavioral: Negative  Past Medical History:   Diagnosis Date    Acid reflux     CKD (chronic kidney disease), stage II     [facility]     Essential hypertension     Hyperlipidemia     Hypertension     Type 2 diabetes mellitus [facility]        Past Surgical History:   Procedure Laterality Date    ANKLE SURGERY  Nov 2019    ARTHROSCOPIC REPAIR ACL Right     knee    BARIATRIC SURGERY      gastric sleeve    GALLBLADDER SURGERY      SKIN BIOPSY      scalp    TENDON REPAIR Left     ankle    TONSILLECTOMY         Family History   Problem Relation Age of Onset    Hypertension Mother     Diabetes type II Father     Diabetes Father     Diabetes type II Paternal Grandmother     Cancer Paternal Grandmother        Social History     Occupational History    Not on file   Tobacco Use    Smoking status: Never Smoker    Smokeless tobacco: Never Used   Substance and Sexual Activity    Alcohol use: Yes     Alcohol/week: 5 0 standard drinks     Types: 5 Glasses of wine per week     Comment: occ       Drug use: Never    Sexual activity: Not Currently     Partners: Male     Birth control/protection: None         Current Outpatient Medications:     Biotin 1000 MCG CHEW, Chew 6,000 mcg, Disp: , Rfl:     Calcium 500 MG tablet, Take by mouth, Disp: , Rfl:     Cholecalciferol (VITAMIN D3 SUPER STRENGTH) 50 MCG (2000 UT) CAPS, Take 2,000 Units by mouth , Disp: , Rfl:   Continuous Blood Gluc  (FREESTYLE FRANKI 14 DAY READER) SUZY, by Does not apply route, Disp: , Rfl:     Continuous Blood Gluc Sensor (FREESTYLE FRANKI 14 DAY SENSOR) MISC, 1 Device by Does not apply route every 14 (fourteen) days, Disp: 6 each, Rfl: 3    cyclobenzaprine (FLEXERIL) 10 mg tablet, Take 1 tablet (10 mg total) by mouth daily at bedtime, Disp: 20 tablet, Rfl: 0    Farxiga 10 MG TABS, TAKE 1 TABLET BY MOUTH EVERY DAY, Disp: 30 tablet, Rfl: 5    lansoprazole (PREVACID) 15 mg capsule, Take 15 mg by mouth daily, Disp: , Rfl:     lisinopril (ZESTRIL) 2 5 mg tablet, Take 2 5 mg by mouth daily, Disp: , Rfl:     metFORMIN (GLUCOPHAGE-XR) 500 mg 24 hr tablet, TAKE 2 TABLETS BY MOUTH TWICE A DAY WITH FOOD, Disp: 120 tablet, Rfl: 5    Multiple Vitamins-Minerals (MULTIVITAMIN WITH MINERALS) tablet, Take 1 tablet by mouth daily, Disp: , Rfl:     nystatin (MYCOSTATIN) cream, Apply topically 2 (two) times a day, Disp: , Rfl:     Oxymetazoline HCl (RHOFADE) 1 % CREA, Apply topically, Disp: , Rfl:     Probiotic Product (ALIGN) 4 MG CAPS, Take by mouth, Disp: , Rfl:     rosuvastatin (CRESTOR) 5 mg tablet, Take 5 mg by mouth daily, Disp: , Rfl:     vitamin B-12 (VITAMIN B-12) 1,000 mcg tablet, Take 10,000 mcg by mouth daily, Disp: , Rfl:     zolpidem (AMBIEN) 5 mg tablet, Take 2 5 mg by mouth daily at bedtime as needed for sleep, Disp: , Rfl:     Allergies   Allergen Reactions    Ampicillin     Darvon [Propoxyphene]     Vioxx [Rofecoxib]        Objective:  Vitals:    05/26/21 0851   BP: 122/80   Pulse: 84       Body mass index is 42 77 kg/m²  Right Knee Exam     Tenderness   The patient is experiencing tenderness in the lateral joint line and medial joint line  Range of Motion   Right knee extension: 0  Right knee flexion: 130       Tests   Varus: negative Valgus: negative  Drawer:  Anterior - negative    Posterior - negative  Patellar apprehension: negative    Other   Erythema: absent  Scars: present (well healed operative incisions from prior ACL BTB)  Sensation: normal  Pulse: present  Swelling: none  Effusion: no effusion present    Comments:  Stable at 0, 30 and 90°  Neurovascularly intact distally  Parapatellar crepitance noted with active and passive range of motion  Patellofemoral grind:  Negative                 Observations     Right Knee   Negative for effusion  Physical Exam  Vitals signs and nursing note reviewed  Constitutional:       Appearance: She is well-developed  HENT:      Head: Atraumatic  Eyes:      Extraocular Movements: Extraocular movements intact  Conjunctiva/sclera: Conjunctivae normal    Neck:      Musculoskeletal: Neck supple  Cardiovascular:      Rate and Rhythm: Normal rate  Pulmonary:      Effort: Pulmonary effort is normal    Musculoskeletal:      Right knee: She exhibits no effusion  Comments: See orthopedic exam   Skin:     General: Skin is warm and dry  Neurological:      Mental Status: She is alert and oriented to person, place, and time  Nidhi GRANT    Division of Adult Reconstruction  Department of Orthopaedic Surgery  [facility]

## 2021-05-28 ENCOUNTER — TELEPHONE (OUTPATIENT)
Dept: ENDOCRINOLOGY | Facility: CLINIC | Age: 61
End: 2021-05-28

## 2021-08-13 ENCOUNTER — TELEPHONE (OUTPATIENT)
Dept: ENDOCRINOLOGY | Facility: CLINIC | Age: 61
End: 2021-08-13

## 2021-08-16 ENCOUNTER — LAB (OUTPATIENT)
Dept: LAB | Facility: CLINIC | Age: 61
End: 2021-08-16
Payer: COMMERCIAL

## 2021-08-16 DIAGNOSIS — I10 ESSENTIAL HYPERTENSION: ICD-10-CM

## 2021-08-16 DIAGNOSIS — E11.65 TYPE 2 DIABETES MELLITUS WITH HYPERGLYCEMIA, WITHOUT LONG-TERM CURRENT USE OF INSULIN (HCC): ICD-10-CM

## 2021-08-16 LAB
ANION GAP SERPL CALCULATED.3IONS-SCNC: 5 MMOL/L (ref 4–13)
BUN SERPL-MCNC: 18 MG/DL (ref 5–25)
CALCIUM SERPL-MCNC: 9 MG/DL (ref 8.3–10.1)
CHLORIDE SERPL-SCNC: 103 MMOL/L (ref 100–108)
CO2 SERPL-SCNC: 27 MMOL/L (ref 21–32)
CREAT SERPL-MCNC: 0.78 MG/DL (ref 0.6–1.3)
CREAT UR-MCNC: 54.2 MG/DL
EST. AVERAGE GLUCOSE BLD GHB EST-MCNC: 192 MG/DL
GFR SERPL CREATININE-BSD FRML MDRD: 83 ML/MIN/1.73SQ M
GLUCOSE P FAST SERPL-MCNC: 187 MG/DL (ref 65–99)
HBA1C MFR BLD: 8.3 %
MICROALBUMIN UR-MCNC: 9.8 MG/L (ref 0–20)
MICROALBUMIN/CREAT 24H UR: 18 MG/G CREATININE (ref 0–30)
POTASSIUM SERPL-SCNC: 4.1 MMOL/L (ref 3.5–5.3)
SODIUM SERPL-SCNC: 135 MMOL/L (ref 136–145)

## 2021-08-16 PROCEDURE — 82570 ASSAY OF URINE CREATININE: CPT

## 2021-08-16 PROCEDURE — 83036 HEMOGLOBIN GLYCOSYLATED A1C: CPT

## 2021-08-16 PROCEDURE — 36415 COLL VENOUS BLD VENIPUNCTURE: CPT

## 2021-08-16 PROCEDURE — 82043 UR ALBUMIN QUANTITATIVE: CPT

## 2021-08-16 PROCEDURE — 80048 BASIC METABOLIC PNL TOTAL CA: CPT

## 2021-08-17 NOTE — PROGRESS NOTES
Patient Progress Note      CC: Dm      Referring Provider  Franchesca Martinez Md  [facility],  [address]     History of Present Illness:   Tj Camargo is a 62 y o  female with a history of type 2 diabetes without long term use of insulin  She has been off of insulin since her gastric sleeve surgery   Diabetes course has been stable   Denies recent illness or hospitalizations   Denies any issues with her current regimen  Home glucose monitoring: are performed regularly, using [employer]  She is currently waiting for her supplies      Download from July 2021 shows average glucose of 165 mg/dl however there is a lot of data missing  CGM is active 17% of the time  Current regimen: metformin 1000 mg BID, Farxiga 10 mg daily  compliant most of the time, denies any side effects from medications  Hypoglycemic episodes: No, rare  H/o of hypoglycemia causing hospitalization or Intervention such as glucagon injection  or ambulance call :  No  Hypoglycemia symptoms: jitteriness  Treatment of hypoglycemia: discussed treatment     Medic alert tag: recommended: Yes     Diabetes education: Not recently  Diet: 3 meals per day, 1-2 snacks per day  Timing of meals is predictable  Diabetic diet compliance:  noncompliant some of the time  Activity: Daily activity is predictable: Yes  Exercise is limited right now due to knee pain  She wants to get a knee replacement in early 2022          Ophthamology: thinks she went in the end of 2020  [facility] in [place], [place]  Podiatry: Nov 2020      Has hypertension: on ACE inhibitor/ARB, compliant most of the time  Has hyperlipidemia: on statin - tolerating well, no myalgias  compliant most of the time, denies any side effects from medications    Thyroid disorders: No  History of pancreatitis: Yes (from [place])    Patient Active Problem List   Diagnosis    Type 2 diabetes mellitus ([facility] [address] )    Essential hypertension    Mixed hyperlipidemia      Past Medical History: Diagnosis Date    Acid reflux     CKD (chronic kidney disease), stage II     Diabetes mellitus ([facility] [address] )     Essential hypertension     Hyperlipidemia     Hypertension     Type 2 diabetes mellitus (HCC)       Past Surgical History:   Procedure Laterality Date    ANKLE SURGERY  Nov 2019    ARTHROSCOPIC REPAIR ACL Right     knee    BARIATRIC SURGERY      gastric sleeve    GALLBLADDER SURGERY      SKIN BIOPSY      scalp    TENDON REPAIR Left     ankle    TONSILLECTOMY        Family History   Problem Relation Age of Onset    Hypertension Mother     Diabetes type II Father     Diabetes Father     Diabetes type II Paternal Grandmother     Cancer Paternal Grandmother      Social History     Tobacco Use    Smoking status: Never Smoker    Smokeless tobacco: Never Used   Substance Use Topics    Alcohol use: Yes     Alcohol/week: 5 0 standard drinks     Types: 5 Glasses of wine per week     Comment: occ        Allergies   Allergen Reactions    Ampicillin     Darvon [Propoxyphene]     Vioxx [Rofecoxib]          Current Outpatient Medications:     Biotin 1000 MCG CHEW, Chew 6,000 mcg, Disp: , Rfl:     Calcium 500 MG tablet, Take by mouth, Disp: , Rfl:     Cholecalciferol (VITAMIN D3 SUPER STRENGTH) 50 MCG (2000 UT) CAPS, Take 1,000 Units by mouth , Disp: , Rfl:     Continuous Blood Gluc  (FREESTYLE FRANKI 14 DAY READER) SUZY, by Does not apply route, Disp: , Rfl:     Continuous Blood Gluc Sensor (FREESTYLE FRANKI 14 DAY SENSOR) MISC, 1 Device by Does not apply route every 14 (fourteen) days, Disp: 6 each, Rfl: 3    Farxiga 10 MG TABS, TAKE 1 TABLET BY MOUTH EVERY DAY, Disp: 30 tablet, Rfl: 5    lansoprazole (PREVACID) 15 mg capsule, Take 15 mg by mouth daily, Disp: , Rfl:     lisinopril (ZESTRIL) 2 5 mg tablet, Take 2 5 mg by mouth daily, Disp: , Rfl:     metFORMIN (GLUCOPHAGE-XR) 500 mg 24 hr tablet, TAKE 2 TABLETS BY MOUTH TWICE A DAY WITH FOOD, Disp: 120 tablet, Rfl: 5    Multiple Vitamins-Minerals (MULTIVITAMIN WITH MINERALS) tablet, Take 1 tablet by mouth daily, Disp: , Rfl:     nystatin (MYCOSTATIN) cream, Apply topically 2 (two) times a day, Disp: , Rfl:     Oxymetazoline HCl (RHOFADE) 1 % CREA, Apply topically, Disp: , Rfl:     Probiotic Product (ALIGN) 4 MG CAPS, Take by mouth, Disp: , Rfl:     rosuvastatin (CRESTOR) 5 mg tablet, Take 5 mg by mouth daily, Disp: , Rfl:     vitamin B-12 (VITAMIN B-12) 1,000 mcg tablet, Take 10,000 mcg by mouth daily, Disp: , Rfl:     zolpidem (AMBIEN) 5 mg tablet, Take 2 5 mg by mouth daily at bedtime as needed for sleep, Disp: , Rfl:     cyclobenzaprine (FLEXERIL) 10 mg tablet, Take 1 tablet (10 mg total) by mouth daily at bedtime (Patient not taking: Reported on 8/18/2021), Disp: 20 tablet, Rfl: 0    glipiZIDE (GLUCOTROL XL) 2 5 mg 24 hr tablet, Take 1 tablet (2 5 mg total) by mouth daily with dinner, Disp: 90 tablet, Rfl: 1  Review of Systems   Constitutional: Positive for activity change (decreased due to knee pain)  Negative for appetite change, fatigue and unexpected weight change  HENT: Negative for trouble swallowing  Eyes: Negative for visual disturbance  Respiratory: Negative for shortness of breath  Cardiovascular: Negative for chest pain and palpitations  Gastrointestinal: Negative for constipation and diarrhea  Endocrine: Negative for polydipsia and polyuria  Musculoskeletal: Positive for arthralgias (knee)  Skin: Negative for wound  Neurological: Negative for numbness  Psychiatric/Behavioral: Negative  Physical Exam:  Body mass index is 42 93 kg/m²  /66   Pulse 80   Temp 97 6 °F (36 4 °C)   Wt 117 kg (258 lb)   BMI 42 93 kg/m²    Wt Readings from Last 3 Encounters:   08/18/21 117 kg (258 lb)   05/26/21 117 kg (257 lb)   04/22/21 115 kg (254 lb)       Physical Exam  Vitals and nursing note reviewed  Constitutional:       Appearance: She is well-developed  HENT:      Head: Normocephalic  Eyes:      General: No scleral icterus  Pupils: Pupils are equal, round, and reactive to light  Neck:      Thyroid: No thyromegaly  Cardiovascular:      Rate and Rhythm: Normal rate and regular rhythm  Pulses:           Radial pulses are 2+ on the right side and 2+ on the left side  Heart sounds: No murmur heard  Pulmonary:      Effort: Pulmonary effort is normal  No respiratory distress  Breath sounds: Normal breath sounds  No wheezing  Musculoskeletal:      Cervical back: Neck supple  Skin:     General: Skin is warm and dry  Neurological:      Mental Status: She is alert  Patient's shoes and socks were not removed  Labs:   Component      Latest Ref Rng & Units 6/3/2020 11/13/2020 2/22/2021   Sodium      136 - 145 mmol/L 135 (L) 136 138   Potassium      3 5 - 5 3 mmol/L 4 2 4 4 4 4   Chloride      100 - 108 mmol/L 102 101 103   CO2      21 - 32 mmol/L 25 30 30   Anion Gap      4 - 13 mmol/L 8 5 5   BUN      5 - 25 mg/dL 19 15 18   Creatinine      0 60 - 1 30 mg/dL 0 80 0 88 0 82   GLUCOSE FASTING      65 - 99 mg/dL 163 (H) 161 (H) 167 (H)   Calcium      8 3 - 10 1 mg/dL 9 1 9 5 9 9   eGFR      ml/min/1 73sq m 82 72 79   Cholesterol      50 - 200 mg/dL 171     Triglycerides      <=150 mg/dL 129     HDL      >=40 mg/dL 69     LDL Calculated      0 - 100 mg/dL 76     Non-HDL Cholesterol      mg/dl 102     EXT Creatinine Urine      mg/dL 48 0     MICROALBUM ,U,RANDOM      0 0 - 20 0 mg/L 8 2     MICROALBUMIN/CREATININE RATIO      0 - 30 mg/g creatinine 17     Hemoglobin A1C      Normal 3 8-5 6%; PreDiabetic 5 7-6 4%;  Diabetic >=6 5%; Glycemic control for adults with diabetes <7 0% % 7 5 (H) 8 4 (H) 7 6 (H)   eAG, EST AVG Glucose      mg/dl 169 194 171     Component      Latest Ref Rng & Units 8/16/2021   Sodium      136 - 145 mmol/L 135 (L)   Potassium      3 5 - 5 3 mmol/L 4 1   Chloride      100 - 108 mmol/L 103   CO2      21 - 32 mmol/L 27   Anion Gap      4 - 13 mmol/L 5   BUN      5 - 25 mg/dL 18   Creatinine      0 60 - 1 30 mg/dL 0 78   GLUCOSE FASTING      65 - 99 mg/dL 187 (H)   Calcium      8 3 - 10 1 mg/dL 9 0   eGFR      ml/min/1 73sq m 83   Cholesterol      50 - 200 mg/dL    Triglycerides      <=150 mg/dL    HDL      >=40 mg/dL    LDL Calculated      0 - 100 mg/dL    Non-HDL Cholesterol      mg/dl    EXT Creatinine Urine      mg/dL 54 2   MICROALBUM ,U,RANDOM      0 0 - 20 0 mg/L 9 8   MICROALBUMIN/CREATININE RATIO      0 - 30 mg/g creatinine 18   Hemoglobin A1C      Normal 3 8-5 6%; PreDiabetic 5 7-6 4%; Diabetic >=6 5%; Glycemic control for adults with diabetes <7 0% % 8 3 (H)   eAG, EST AVG Glucose      mg/dl 192     Plan:    Diagnoses and all orders for this visit:    Type 2 diabetes mellitus with hyperglycemia, without long-term current use of insulin (HCC)  HGA1C 8 3%  Worsened  Treatment regimen: start glipizide 2 5 mg daily with dinner  Continue metformin and Farxiga  Discussed for TKA her A1C target should be under 7 if possible or low 7s  Discussed risks/complications associated with uncontrolled diabetes  Advised to adhere to diabetic diet, and recommended staying active/exercising routinely as tolerated  Keep carbohydrates consistent to limit blood glucose fluctuations  Advised to call if blood sugars less than 70 mg/dl or over 300 mg/dl  Check blood glucose 2+ times a day  Discussed symptoms and treatment of hypoglycemia  Discussed use of CGM to collect additional blood glucose data to reveal trends and patterns that can be used to optimize treatment plan  Recommended routine follow-up with podiatry and ophthalmology  Send log in 2 weeks  Ordered blood work to complete prior to next visit  -     Hemoglobin A1C; Future  -     Basic metabolic panel; Future  -     glipiZIDE (GLUCOTROL XL) 2 5 mg 24 hr tablet;  Take 1 tablet (2 5 mg total) by mouth daily with dinner    Essential hypertension  Blood pressure adequately controlled, continue current treatment  -     Basic metabolic panel; Future    Mixed hyperlipidemia  LDL previously 76  Continue statin therapy       Discussed with the patient diagnosis and treatment and all questions fully answered  She will call me if any problems arise  Counseled patient on diagnostic results, prognosis, risk and benefit of treatment options, instruction for management, importance of treatment compliance, risk factor reduction and impressions        Brenda Mary PA-C

## 2021-08-17 NOTE — PATIENT INSTRUCTIONS
Hypoglycemia instructions   Ector Antoniobury  8/17/2021  [id number]    Low Blood Sugar    Steps to treat low blood sugar  1  Test blood sugar if you have symptoms of low blood sugar:   Low Blood Sugar Symptoms:  o Sweaty  o Dizzy  o Rapid heartbeat  o Shaky  o Bad mood  o Hungry      2  Treat blood sugar less than 70 with 15 grams of fast-acting carbohydrate:   Examples of 15 grams Fast-Acting Carbohydrate:  o 4 oz juice  o 4 oz regular soda  o 3-4 glucose tablets (chew)  o 3-4 hard candies (chew)          3  Wait 15 minutes and test your blood sugar again     4   If blood sugar is less than 100, repeat steps 2-3     5  When your blood sugar is 100 or more, eat a snack if it will be longer than one hour until your next meal  The snack should be 15 grams of carbohydrate and a protein:   Examples of snacks:  o ½ sandwich  o 6 crackers with cheese  o Piece of fruit with cheese or peanut butter  o 6 crackers with peanut butter

## 2021-08-18 ENCOUNTER — OFFICE VISIT (OUTPATIENT)
Dept: ENDOCRINOLOGY | Facility: CLINIC | Age: 61
End: 2021-08-18
Payer: COMMERCIAL

## 2021-08-18 VITALS
SYSTOLIC BLOOD PRESSURE: 112 MMHG | TEMPERATURE: 97.6 F | BODY MASS INDEX: 42.93 KG/M2 | WEIGHT: 258 LBS | HEART RATE: 80 BPM | DIASTOLIC BLOOD PRESSURE: 66 MMHG

## 2021-08-18 DIAGNOSIS — E11.65 TYPE 2 DIABETES MELLITUS WITH HYPERGLYCEMIA, WITHOUT LONG-TERM CURRENT USE OF INSULIN (HCC): ICD-10-CM

## 2021-08-18 DIAGNOSIS — I10 ESSENTIAL HYPERTENSION: ICD-10-CM

## 2021-08-18 DIAGNOSIS — E78.2 MIXED HYPERLIPIDEMIA: ICD-10-CM

## 2021-08-18 DIAGNOSIS — E11.65 TYPE 2 DIABETES MELLITUS WITH HYPERGLYCEMIA, WITHOUT LONG-TERM CURRENT USE OF INSULIN (HCC): Primary | ICD-10-CM

## 2021-08-18 PROCEDURE — 99214 OFFICE O/P EST MOD 30 MIN: CPT | Performed by: PHYSICIAN ASSISTANT

## 2021-08-18 RX ORDER — FLASH GLUCOSE SENSOR
1 KIT MISCELLANEOUS
Qty: 6 EACH | Refills: 3 | Status: SHIPPED | OUTPATIENT
Start: 2021-08-18 | End: 2022-08-10

## 2021-08-18 RX ORDER — GLIPIZIDE 2.5 MG/1
2.5 TABLET, EXTENDED RELEASE ORAL
Qty: 90 TABLET | Refills: 1 | Status: SHIPPED | OUTPATIENT
Start: 2021-08-18 | End: 2021-09-09

## 2021-09-08 ENCOUNTER — TELEPHONE (OUTPATIENT)
Dept: ENDOCRINOLOGY | Facility: CLINIC | Age: 61
End: 2021-09-08

## 2021-09-08 NOTE — TELEPHONE ENCOUNTER
Pt stated she started getting lows with the glipiZIDE  About 9-1 she started cutting it in half thinking it would help  She still had some lows  She stopped medication entirely today  Attached [employer] for review

## 2021-09-09 NOTE — TELEPHONE ENCOUNTER
It does appear on the download that the past few days her readings were often tightly controlled and sometimes even low  It is ok that she stopped the glipizide  Please d/c from chart  Advise patient to share [employer] download again in 2 weeks

## 2021-09-11 DIAGNOSIS — E11.65 UNCONTROLLED TYPE 2 DIABETES MELLITUS WITH HYPERGLYCEMIA (HCC): ICD-10-CM

## 2021-09-11 RX ORDER — METFORMIN HYDROCHLORIDE 500 MG/1
TABLET, EXTENDED RELEASE ORAL
Qty: 120 TABLET | Refills: 5 | Status: SHIPPED | OUTPATIENT
Start: 2021-09-11 | End: 2022-03-11

## 2021-11-05 ENCOUNTER — OFFICE VISIT (OUTPATIENT)
Dept: OBGYN CLINIC | Facility: CLINIC | Age: 61
End: 2021-11-05
Payer: COMMERCIAL

## 2021-11-05 VITALS
SYSTOLIC BLOOD PRESSURE: 140 MMHG | BODY MASS INDEX: 43.65 KG/M2 | HEIGHT: 65 IN | WEIGHT: 262 LBS | HEART RATE: 60 BPM | DIASTOLIC BLOOD PRESSURE: 80 MMHG

## 2021-11-05 DIAGNOSIS — G89.29 CHRONIC PAIN OF RIGHT KNEE: ICD-10-CM

## 2021-11-05 DIAGNOSIS — E11.65 TYPE 2 DIABETES MELLITUS WITH HYPERGLYCEMIA, WITHOUT LONG-TERM CURRENT USE OF INSULIN (HCC): ICD-10-CM

## 2021-11-05 DIAGNOSIS — M25.561 CHRONIC PAIN OF RIGHT KNEE: ICD-10-CM

## 2021-11-05 DIAGNOSIS — M17.31 POST-TRAUMATIC OSTEOARTHRITIS OF RIGHT KNEE: Primary | ICD-10-CM

## 2021-11-05 PROCEDURE — 99214 OFFICE O/P EST MOD 30 MIN: CPT | Performed by: ORTHOPAEDIC SURGERY

## 2021-11-14 DIAGNOSIS — E11.65 TYPE 2 DIABETES MELLITUS WITH HYPERGLYCEMIA, WITHOUT LONG-TERM CURRENT USE OF INSULIN (HCC): ICD-10-CM

## 2021-11-15 RX ORDER — DAPAGLIFLOZIN 10 MG/1
TABLET, FILM COATED ORAL
Qty: 30 TABLET | Refills: 5 | Status: SHIPPED | OUTPATIENT
Start: 2021-11-15 | End: 2022-05-04

## 2021-11-18 ENCOUNTER — TELEPHONE (OUTPATIENT)
Dept: OBGYN CLINIC | Facility: HOSPITAL | Age: 61
End: 2021-11-18

## 2021-11-19 ENCOUNTER — LAB (OUTPATIENT)
Dept: LAB | Facility: CLINIC | Age: 61
End: 2021-11-19
Payer: COMMERCIAL

## 2021-11-19 DIAGNOSIS — I10 ESSENTIAL HYPERTENSION: ICD-10-CM

## 2021-11-19 DIAGNOSIS — E11.65 TYPE 2 DIABETES MELLITUS WITH HYPERGLYCEMIA, WITHOUT LONG-TERM CURRENT USE OF INSULIN (HCC): ICD-10-CM

## 2021-11-19 LAB
ANION GAP SERPL CALCULATED.3IONS-SCNC: 5 MMOL/L (ref 4–13)
BUN SERPL-MCNC: 16 MG/DL (ref 5–25)
CALCIUM SERPL-MCNC: 9.5 MG/DL (ref 8.3–10.1)
CHLORIDE SERPL-SCNC: 102 MMOL/L (ref 100–108)
CO2 SERPL-SCNC: 26 MMOL/L (ref 21–32)
CREAT SERPL-MCNC: 0.84 MG/DL (ref 0.6–1.3)
EST. AVERAGE GLUCOSE BLD GHB EST-MCNC: 186 MG/DL
GFR SERPL CREATININE-BSD FRML MDRD: 76 ML/MIN/1.73SQ M
GLUCOSE P FAST SERPL-MCNC: 171 MG/DL (ref 65–99)
HBA1C MFR BLD: 8.1 %
POTASSIUM SERPL-SCNC: 4.2 MMOL/L (ref 3.5–5.3)
SODIUM SERPL-SCNC: 133 MMOL/L (ref 136–145)

## 2021-11-19 PROCEDURE — 80048 BASIC METABOLIC PNL TOTAL CA: CPT

## 2021-11-19 PROCEDURE — 83036 HEMOGLOBIN GLYCOSYLATED A1C: CPT

## 2021-11-19 PROCEDURE — 36415 COLL VENOUS BLD VENIPUNCTURE: CPT

## 2021-11-23 ENCOUNTER — OFFICE VISIT (OUTPATIENT)
Dept: ENDOCRINOLOGY | Facility: CLINIC | Age: 61
End: 2021-11-23
Payer: COMMERCIAL

## 2021-11-23 VITALS
DIASTOLIC BLOOD PRESSURE: 76 MMHG | BODY MASS INDEX: 42.6 KG/M2 | HEART RATE: 67 BPM | WEIGHT: 256 LBS | SYSTOLIC BLOOD PRESSURE: 132 MMHG | TEMPERATURE: 98 F

## 2021-11-23 DIAGNOSIS — I10 ESSENTIAL HYPERTENSION: ICD-10-CM

## 2021-11-23 DIAGNOSIS — E78.2 MIXED HYPERLIPIDEMIA: ICD-10-CM

## 2021-11-23 DIAGNOSIS — E11.65 TYPE 2 DIABETES MELLITUS WITH HYPERGLYCEMIA, WITHOUT LONG-TERM CURRENT USE OF INSULIN (HCC): Primary | ICD-10-CM

## 2021-11-23 PROCEDURE — 99214 OFFICE O/P EST MOD 30 MIN: CPT | Performed by: PHYSICIAN ASSISTANT

## 2021-11-23 RX ORDER — GLIPIZIDE 2.5 MG/1
2.5 TABLET, EXTENDED RELEASE ORAL DAILY
Qty: 90 TABLET | Refills: 1 | Status: SHIPPED | OUTPATIENT
Start: 2021-11-23 | End: 2022-04-04

## 2022-03-11 DIAGNOSIS — E11.65 UNCONTROLLED TYPE 2 DIABETES MELLITUS WITH HYPERGLYCEMIA (HCC): ICD-10-CM

## 2022-03-11 RX ORDER — METFORMIN HYDROCHLORIDE 500 MG/1
TABLET, EXTENDED RELEASE ORAL
Qty: 120 TABLET | Refills: 5 | Status: SHIPPED | OUTPATIENT
Start: 2022-03-11

## 2022-04-03 DIAGNOSIS — E11.65 TYPE 2 DIABETES MELLITUS WITH HYPERGLYCEMIA, WITHOUT LONG-TERM CURRENT USE OF INSULIN (HCC): ICD-10-CM

## 2022-04-04 RX ORDER — GLIPIZIDE 2.5 MG/1
TABLET, EXTENDED RELEASE ORAL
Qty: 90 TABLET | Refills: 1 | Status: SHIPPED | OUTPATIENT
Start: 2022-04-04

## 2022-04-26 ENCOUNTER — LAB (OUTPATIENT)
Dept: LAB | Facility: CLINIC | Age: 62
End: 2022-04-26
Payer: COMMERCIAL

## 2022-04-26 DIAGNOSIS — E11.65 TYPE 2 DIABETES MELLITUS WITH HYPERGLYCEMIA, WITHOUT LONG-TERM CURRENT USE OF INSULIN (HCC): ICD-10-CM

## 2022-04-26 DIAGNOSIS — I10 ESSENTIAL HYPERTENSION: ICD-10-CM

## 2022-04-26 LAB
ANION GAP SERPL CALCULATED.3IONS-SCNC: 4 MMOL/L (ref 4–13)
BUN SERPL-MCNC: 20 MG/DL (ref 5–25)
CALCIUM SERPL-MCNC: 9.9 MG/DL (ref 8.3–10.1)
CHLORIDE SERPL-SCNC: 101 MMOL/L (ref 100–108)
CO2 SERPL-SCNC: 31 MMOL/L (ref 21–32)
CREAT SERPL-MCNC: 0.9 MG/DL (ref 0.6–1.3)
EST. AVERAGE GLUCOSE BLD GHB EST-MCNC: 163 MG/DL
GFR SERPL CREATININE-BSD FRML MDRD: 69 ML/MIN/1.73SQ M
GLUCOSE P FAST SERPL-MCNC: 179 MG/DL (ref 65–99)
HBA1C MFR BLD: 7.3 %
POTASSIUM SERPL-SCNC: 4.5 MMOL/L (ref 3.5–5.3)
SODIUM SERPL-SCNC: 136 MMOL/L (ref 136–145)

## 2022-04-26 PROCEDURE — 80048 BASIC METABOLIC PNL TOTAL CA: CPT

## 2022-04-26 PROCEDURE — 83036 HEMOGLOBIN GLYCOSYLATED A1C: CPT

## 2022-04-26 PROCEDURE — 36415 COLL VENOUS BLD VENIPUNCTURE: CPT

## 2022-04-28 ENCOUNTER — OFFICE VISIT (OUTPATIENT)
Dept: ENDOCRINOLOGY | Facility: CLINIC | Age: 62
End: 2022-04-28
Payer: COMMERCIAL

## 2022-04-28 VITALS
TEMPERATURE: 97.6 F | HEART RATE: 71 BPM | SYSTOLIC BLOOD PRESSURE: 128 MMHG | DIASTOLIC BLOOD PRESSURE: 82 MMHG | BODY MASS INDEX: 44.1 KG/M2 | WEIGHT: 265 LBS

## 2022-04-28 DIAGNOSIS — E78.2 MIXED HYPERLIPIDEMIA: ICD-10-CM

## 2022-04-28 DIAGNOSIS — E11.65 TYPE 2 DIABETES MELLITUS WITH HYPERGLYCEMIA, WITHOUT LONG-TERM CURRENT USE OF INSULIN (HCC): Primary | ICD-10-CM

## 2022-04-28 DIAGNOSIS — I10 ESSENTIAL HYPERTENSION: ICD-10-CM

## 2022-04-28 PROCEDURE — 95251 CONT GLUC MNTR ANALYSIS I&R: CPT | Performed by: INTERNAL MEDICINE

## 2022-04-28 PROCEDURE — 99214 OFFICE O/P EST MOD 30 MIN: CPT | Performed by: INTERNAL MEDICINE

## 2022-04-28 NOTE — PROGRESS NOTES
Yadira Ferro 62 y o  female MRN: [id number]    Encounter: [id number]      Assessment/Plan     Assessment: This is a 62y o -year-old female with diabetes with hyperglycemia  Plan:  Diagnoses and all orders for this visit:    Type 2 diabetes mellitus with hyperglycemia, without long-term current use of insulin ([facility] [address] )  Lab Results   Component Value Date    HGBA1C 7 3 (H) 04/26/2022   A1c is 7 3%, based on her blood sugars which are mostly in 100-160 mg/dL range, she should be able to go for knee replacement surgery from diabetes point of view  Continue current management  Discussed to keep carbohydrate consistent with meals and avoid snacks at bedtime  Discussed to download the sensor again in 1 month  Follow-up in 3-4 months     -     Hemoglobin A1C; Future  -     Basic metabolic panel; Future  -     Microalbumin / creatinine urine ratio; Future  -     Lipid panel; Future    Essential hypertension  Blood pressure well controlled  Continue current management    Mixed hyperlipidemia  Lab Results   Component Value Date    LDLCALC 76 06/03/2020   Continue statins      CC: Diabetes    History of Present Illness     HPI:    Yadira Ferro is 62 yr old woman with medical Hx of type 2 DM, obesity , hyperlipidemia , hypertension is here for follow up  she is currently taking metformin 1000 mg BID, glipizide 2 5 mg ER daily, and Farxiga 10 mg podaily   She denies side effects such as UTI or hypoglycemia   She uses continues glucose monitor sensor, 14 days overview from April 15 to April 28, 2022 reviewed  79% blood sugars are in target range 70 to 1 80 mg/dL, 0% blood sugars are below target, 21% blood sugars are about target  Average glucose 154 mg/dL  Blood sugars sometimes elevated from 6:00 a m  To 12:00 p m  And again from 3:00 p m  To 9:00 p m    H/o pancreatitis secondary to byetta injection     Lab Results   Component Value Date    HGBA1C 7 3 (H) 04/26/2022       Results for Sade Alyssa (MRN [id number]) as of 4/28/2022 13:51   Ref  Range 4/26/2022 07:12   Sodium Latest Ref Range: 136 - 145 mmol/L 136   Potassium Latest Ref Range: 3 5 - 5 3 mmol/L 4 5   Chloride Latest Ref Range: 100 - 108 mmol/L 101   CO2 Latest Ref Range: 21 - 32 mmol/L 31   Anion Gap Latest Ref Range: 4 - 13 mmol/L 4   BUN Latest Ref Range: 5 - 25 mg/dL 20   Creatinine Latest Ref Range: 0 60 - 1 30 mg/dL 0 90   GLUCOSE FASTING Latest Ref Range: 65 - 99 mg/dL 179 (H)   Calcium Latest Ref Range: 8 3 - 10 1 mg/dL 9 9   eGFR Latest Units: ml/min/1 73sq m 69     Lab Results   Component Value Date    HGBA1C 7 3 (H) 04/26/2022     Wt Readings from Last 3 Encounters:   04/28/22 120 kg (265 lb)   11/23/21 116 kg (256 lb)   11/05/21 119 kg (262 lb)     Review of Systems    Historical Information   Past Medical History:   Diagnosis Date    Acid reflux     CKD (chronic kidney disease), stage II     Diabetes mellitus (HCC)     Essential hypertension     Hyperlipidemia     Hypertension     Type 2 diabetes mellitus (HCC)      Past Surgical History:   Procedure Laterality Date    ANKLE SURGERY  Nov 2019    ARTHROSCOPIC REPAIR ACL Right     knee    BARIATRIC SURGERY      gastric sleeve    GALLBLADDER SURGERY      SKIN BIOPSY      scalp    TENDON REPAIR Left     ankle    TONSILLECTOMY       Social History   Social History     Substance and Sexual Activity   Alcohol Use Yes    Alcohol/week: 5 0 standard drinks    Types: 5 Glasses of wine per week    Comment: occ        Social History     Substance and Sexual Activity   Drug Use Never     Social History     Tobacco Use   Smoking Status Never Smoker   Smokeless Tobacco Never Used     Family History:   Family History   Problem Relation Age of Onset    Hypertension Mother     Diabetes type II Father     Diabetes Father     Diabetes type II Paternal Grandmother     Cancer Paternal Grandmother        Meds/Allergies   Current Outpatient Medications   Medication Sig Dispense Refill    Biotin 1000 MCG CHEW Chew 6,000 mcg      Calcium 500 MG tablet Take by mouth      Cholecalciferol (VITAMIN D3 SUPER STRENGTH) 50 MCG (2000 UT) CAPS Take 1,000 Units by mouth       Continuous Blood Gluc  (FREESTYLE FRANKI 14 DAY READER) SUZY by Does not apply route      Continuous Blood Gluc Sensor (FreeStyle Franki 14 Day Sensor) MISC Use 1 Device every 14 (fourteen) days 6 each 3    Farxiga 10 MG TABS TAKE 1 TABLET BY MOUTH EVERY DAY 30 tablet 5    glipiZIDE (GLUCOTROL XL) 2 5 mg 24 hr tablet TAKE 1 TABLET BY MOUTH EVERY DAY 90 tablet 1    lansoprazole (PREVACID) 15 mg capsule Take 15 mg by mouth daily      lisinopril (ZESTRIL) 2 5 mg tablet Take 2 5 mg by mouth daily      metFORMIN (GLUCOPHAGE-XR) 500 mg 24 hr tablet TAKE 2 TABLETS BY MOUTH TWICE A DAY WITH FOOD 120 tablet 5    Multiple Vitamins-Minerals (MULTIVITAMIN WITH MINERALS) tablet Take 1 tablet by mouth daily      nystatin (MYCOSTATIN) cream Apply topically 2 (two) times a day      Probiotic Product (ALIGN) 4 MG CAPS Take by mouth      rosuvastatin (CRESTOR) 5 mg tablet Take 5 mg by mouth daily      vitamin B-12 (VITAMIN B-12) 1,000 mcg tablet Take 10,000 mcg by mouth daily      zolpidem (AMBIEN) 5 mg tablet Take 2 5 mg by mouth daily at bedtime as needed for sleep      cyclobenzaprine (FLEXERIL) 10 mg tablet Take 1 tablet (10 mg total) by mouth daily at bedtime (Patient not taking: Reported on 8/18/2021) 20 tablet 0    Oxymetazoline HCl (RHOFADE) 1 % CREA Apply topically (Patient not taking: Reported on 4/28/2022 )       No current facility-administered medications for this visit  Allergies   Allergen Reactions    Ampicillin     Darvon [Propoxyphene]     Vioxx [Rofecoxib]        Objective   Vitals: Blood pressure 128/82, pulse 71, temperature 97 6 °F (36 4 °C), weight 120 kg (265 lb)  Physical Exam  Constitutional:       General: She is not in acute distress  Appearance: Normal appearance  She is not ill-appearing     HENT:      Head: Normocephalic and atraumatic  Nose: Nose normal    Eyes:      Extraocular Movements: Extraocular movements intact  Conjunctiva/sclera: Conjunctivae normal    Cardiovascular:      Rate and Rhythm: Normal rate and regular rhythm  Pulses: Normal pulses  Heart sounds: Normal heart sounds  Pulmonary:      Effort: No respiratory distress  Musculoskeletal:      Cervical back: Normal range of motion and neck supple  Neurological:      General: No focal deficit present  Mental Status: She is alert and oriented to person, place, and time  Psychiatric:         Mood and Affect: Mood normal          Behavior: Behavior normal          The history was obtained from the review of the chart, patient  Lab Results:   Lab Results   Component Value Date/Time    Hemoglobin A1C 7 3 (H) 04/26/2022 07:12 AM    Hemoglobin A1C 8 1 (H) 11/19/2021 07:23 AM    Hemoglobin A1C 8 3 (H) 08/16/2021 07:23 AM    BUN 20 04/26/2022 07:12 AM    BUN 16 11/19/2021 07:23 AM    BUN 18 08/16/2021 07:23 AM    Potassium 4 5 04/26/2022 07:12 AM    Potassium 4 2 11/19/2021 07:23 AM    Potassium 4 1 08/16/2021 07:23 AM    Chloride 101 04/26/2022 07:12 AM    Chloride 102 11/19/2021 07:23 AM    Chloride 103 08/16/2021 07:23 AM    CO2 31 04/26/2022 07:12 AM    CO2 26 11/19/2021 07:23 AM    CO2 27 08/16/2021 07:23 AM    Creatinine 0 90 04/26/2022 07:12 AM    Creatinine 0 84 11/19/2021 07:23 AM    Creatinine 0 78 08/16/2021 07:23 AM           Imaging Studies: I have personally reviewed pertinent reports  Portions of the record may have been created with voice recognition software  Occasional wrong word or "sound a like" substitutions may have occurred due to the inherent limitations of voice recognition software  Read the chart carefully and recognize, using context, where substitutions have occurred

## 2022-05-04 ENCOUNTER — HOSPITAL ENCOUNTER (OUTPATIENT)
Dept: RADIOLOGY | Facility: HOSPITAL | Age: 62
Discharge: HOME | End: 2022-05-04
Payer: COMMERCIAL

## 2022-05-04 VITALS — BODY MASS INDEX: 44.15 KG/M2 | WEIGHT: 265 LBS | HEIGHT: 65 IN

## 2022-05-04 DIAGNOSIS — Z12.31 ENCOUNTER FOR SCREENING MAMMOGRAM FOR MALIGNANT NEOPLASM OF BREAST: ICD-10-CM

## 2022-05-04 DIAGNOSIS — E11.65 TYPE 2 DIABETES MELLITUS WITH HYPERGLYCEMIA, WITHOUT LONG-TERM CURRENT USE OF INSULIN (HCC): ICD-10-CM

## 2022-05-04 PROCEDURE — 77063 BREAST TOMOSYNTHESIS BI: CPT

## 2022-05-04 PROCEDURE — 77067 SCR MAMMO BI INCL CAD: CPT

## 2022-05-04 RX ORDER — DAPAGLIFLOZIN 10 MG/1
TABLET, FILM COATED ORAL
Qty: 30 TABLET | Refills: 5 | Status: SHIPPED | OUTPATIENT
Start: 2022-05-04

## 2022-07-16 ENCOUNTER — HOSPITAL ENCOUNTER (EMERGENCY)
Facility: HOSPITAL | Age: 62
Discharge: HOME | End: 2022-07-16
Attending: EMERGENCY MEDICINE | Admitting: EMERGENCY MEDICINE
Payer: COMMERCIAL

## 2022-07-16 ENCOUNTER — APPOINTMENT (EMERGENCY)
Dept: CT IMAGING | Facility: HOSPITAL | Age: 62
End: 2022-07-16
Payer: COMMERCIAL

## 2022-07-16 VITALS
TEMPERATURE: 97.6 F | HEART RATE: 64 BPM | BODY MASS INDEX: 44.98 KG/M2 | DIASTOLIC BLOOD PRESSURE: 64 MMHG | RESPIRATION RATE: 16 BRPM | OXYGEN SATURATION: 97 % | WEIGHT: 270.28 LBS | SYSTOLIC BLOOD PRESSURE: 135 MMHG

## 2022-07-16 DIAGNOSIS — R42 VERTIGO: Primary | ICD-10-CM

## 2022-07-16 LAB
2HR DELTA HS TROPONIN: 0 NG/L
ALBUMIN SERPL BCP-MCNC: 4.3 G/DL (ref 3.5–5)
ALP SERPL-CCNC: 52 U/L (ref 34–104)
ALT SERPL W P-5'-P-CCNC: 19 U/L (ref 7–52)
ANION GAP SERPL CALCULATED.3IONS-SCNC: 8 MMOL/L (ref 4–13)
AST SERPL W P-5'-P-CCNC: 15 U/L (ref 13–39)
BASOPHILS # BLD AUTO: 0.03 THOUSANDS/ΜL (ref 0–0.1)
BASOPHILS NFR BLD AUTO: 1 % (ref 0–1)
BILIRUB SERPL-MCNC: 0.58 MG/DL (ref 0.2–1)
BUN SERPL-MCNC: 21 MG/DL (ref 5–25)
CALCIUM SERPL-MCNC: 9.8 MG/DL (ref 8.4–10.2)
CARDIAC TROPONIN I PNL SERPL HS: 2 NG/L
CARDIAC TROPONIN I PNL SERPL HS: 2 NG/L
CHLORIDE SERPL-SCNC: 101 MMOL/L (ref 96–108)
CO2 SERPL-SCNC: 27 MMOL/L (ref 21–32)
CREAT SERPL-MCNC: 0.67 MG/DL (ref 0.6–1.3)
EOSINOPHIL # BLD AUTO: 0.09 THOUSAND/ΜL (ref 0–0.61)
EOSINOPHIL NFR BLD AUTO: 2 % (ref 0–6)
ERYTHROCYTE [DISTWIDTH] IN BLOOD BY AUTOMATED COUNT: 13.1 % (ref 11.6–15.1)
GFR SERPL CREATININE-BSD FRML MDRD: 95 ML/MIN/1.73SQ M
GLUCOSE SERPL-MCNC: 165 MG/DL (ref 65–140)
HCT VFR BLD AUTO: 43.1 % (ref 34.8–46.1)
HGB BLD-MCNC: 14.1 G/DL (ref 11.5–15.4)
IMM GRANULOCYTES # BLD AUTO: 0.04 THOUSAND/UL (ref 0–0.2)
IMM GRANULOCYTES NFR BLD AUTO: 1 % (ref 0–2)
LYMPHOCYTES # BLD AUTO: 1.15 THOUSANDS/ΜL (ref 0.6–4.47)
LYMPHOCYTES NFR BLD AUTO: 19 % (ref 14–44)
MCH RBC QN AUTO: 30.8 PG (ref 26.8–34.3)
MCHC RBC AUTO-ENTMCNC: 32.7 G/DL (ref 31.4–37.4)
MCV RBC AUTO: 94 FL (ref 82–98)
MONOCYTES # BLD AUTO: 0.43 THOUSAND/ΜL (ref 0.17–1.22)
MONOCYTES NFR BLD AUTO: 7 % (ref 4–12)
NEUTROPHILS # BLD AUTO: 4.22 THOUSANDS/ΜL (ref 1.85–7.62)
NEUTS SEG NFR BLD AUTO: 70 % (ref 43–75)
NRBC BLD AUTO-RTO: 0 /100 WBCS
PLATELET # BLD AUTO: 182 THOUSANDS/UL (ref 149–390)
PMV BLD AUTO: 10.8 FL (ref 8.9–12.7)
POTASSIUM SERPL-SCNC: 4.2 MMOL/L (ref 3.5–5.3)
PROT SERPL-MCNC: 7.3 G/DL (ref 6.4–8.4)
RBC # BLD AUTO: 4.58 MILLION/UL (ref 3.81–5.12)
SODIUM SERPL-SCNC: 136 MMOL/L (ref 135–147)
WBC # BLD AUTO: 5.96 THOUSAND/UL (ref 4.31–10.16)

## 2022-07-16 PROCEDURE — 85025 COMPLETE CBC W/AUTO DIFF WBC: CPT | Performed by: PHYSICIAN ASSISTANT

## 2022-07-16 PROCEDURE — 96361 HYDRATE IV INFUSION ADD-ON: CPT

## 2022-07-16 PROCEDURE — 93005 ELECTROCARDIOGRAM TRACING: CPT

## 2022-07-16 PROCEDURE — 84484 ASSAY OF TROPONIN QUANT: CPT | Performed by: PHYSICIAN ASSISTANT

## 2022-07-16 PROCEDURE — 99284 EMERGENCY DEPT VISIT MOD MDM: CPT

## 2022-07-16 PROCEDURE — G1004 CDSM NDSC: HCPCS

## 2022-07-16 PROCEDURE — 70450 CT HEAD/BRAIN W/O DYE: CPT

## 2022-07-16 PROCEDURE — 36415 COLL VENOUS BLD VENIPUNCTURE: CPT | Performed by: PHYSICIAN ASSISTANT

## 2022-07-16 PROCEDURE — 96374 THER/PROPH/DIAG INJ IV PUSH: CPT

## 2022-07-16 PROCEDURE — 99285 EMERGENCY DEPT VISIT HI MDM: CPT | Performed by: PHYSICIAN ASSISTANT

## 2022-07-16 PROCEDURE — 80053 COMPREHEN METABOLIC PANEL: CPT | Performed by: PHYSICIAN ASSISTANT

## 2022-07-16 RX ORDER — ONDANSETRON 4 MG/1
4 TABLET, FILM COATED ORAL EVERY 6 HOURS
Qty: 12 TABLET | Refills: 0 | Status: SHIPPED | OUTPATIENT
Start: 2022-07-16

## 2022-07-16 RX ORDER — METOCLOPRAMIDE HYDROCHLORIDE 5 MG/ML
10 INJECTION INTRAMUSCULAR; INTRAVENOUS ONCE
Status: COMPLETED | OUTPATIENT
Start: 2022-07-16 | End: 2022-07-16

## 2022-07-16 RX ORDER — MECLIZINE HYDROCHLORIDE 25 MG/1
25 TABLET ORAL 3 TIMES DAILY PRN
Qty: 30 TABLET | Refills: 0 | Status: SHIPPED | OUTPATIENT
Start: 2022-07-16

## 2022-07-16 RX ORDER — MECLIZINE HCL 12.5 MG/1
25 TABLET ORAL ONCE
Status: COMPLETED | OUTPATIENT
Start: 2022-07-16 | End: 2022-07-16

## 2022-07-16 RX ADMIN — METOCLOPRAMIDE 10 MG: 5 INJECTION, SOLUTION INTRAMUSCULAR; INTRAVENOUS at 10:25

## 2022-07-16 RX ADMIN — SODIUM CHLORIDE 1000 ML: 0.9 INJECTION, SOLUTION INTRAVENOUS at 08:55

## 2022-07-16 RX ADMIN — MECLIZINE 25 MG: 12.5 TABLET ORAL at 08:55

## 2022-07-16 NOTE — ED PROVIDER NOTES
History  Chief Complaint   Patient presents with    Dizziness     Pt presents to the ED with onset of severe dizziness onset this morning after waking up  Reports issues over the last month with ringing in her left ear  The patient is a 62-year-old female with history of CKD, diabetes, hypertension and hyperlipidemia who presents to the emergency department for evaluation of dizziness  The patient states she woke up this morning, when she opened her eyes, she had the sensation that she was spinning  This was associated with nausea and significantly worsened with movement  She states after it initially started, she was having some difficulty ambulating, and she needed to hold onto things  She states since then, she has somewhat improved, and she can now walk  However, she states with certain movements of her head or body, the dizziness and nausea will return  She feels better at rest   She denies any prior history of vertigo  She does report a whooshing sensation in her left ear that has been going on for the last month  She states at 1st it was bothersome, however she feels she is getting used to it now  There is no associated pain in her ear  She additionally denies fever, chills, chest pain, shortness of breath, headache or weakness  History provided by:  Patient   used: No    Dizziness  Associated symptoms: nausea    Associated symptoms: no chest pain, no diarrhea, no headaches, no shortness of breath and no vomiting        Prior to Admission Medications   Prescriptions Last Dose Informant Patient Reported? Taking?    Biotin 1000 MCG CHEW   Yes No   Sig: Chew 6,000 mcg   Calcium 500 MG tablet   Yes No   Sig: Take by mouth   Cholecalciferol (VITAMIN D3 SUPER STRENGTH) 50 MCG (2000 UT) CAPS   Yes No   Sig: Take 1,000 Units by mouth    Continuous Blood Gluc  (FREESTYLE FRANKI 14 DAY READER) SUZY   Yes No   Sig: by Does not apply route   Continuous Blood Gluc Sensor (FreeStyle Ankit 14 Day Sensor) [facility]   No No   Sig: Use 1 Device every 14 (fourteen) days   Farxiga 10 MG TABS   No No   Sig: TAKE 1 TABLET BY MOUTH EVERY DAY   Multiple Vitamins-Minerals (MULTIVITAMIN WITH MINERALS) tablet   Yes No   Sig: Take 1 tablet by mouth daily   Oxymetazoline HCl (RHOFADE) 1 % CREA   Yes No   Sig: Apply topically   Patient not taking: Reported on 4/28/2022    Probiotic Product (ALIGN) 4 MG CAPS   Yes No   Sig: Take by mouth   cyclobenzaprine (FLEXERIL) 10 mg tablet   No No   Sig: Take 1 tablet (10 mg total) by mouth daily at bedtime   Patient not taking: Reported on 8/18/2021   glipiZIDE (GLUCOTROL XL) 2 5 mg 24 hr tablet   No No   Sig: TAKE 1 TABLET BY MOUTH EVERY DAY   lansoprazole (PREVACID) 15 mg capsule   Yes No   Sig: Take 15 mg by mouth daily   lisinopril (ZESTRIL) 2 5 mg tablet   Yes No   Sig: Take 2 5 mg by mouth daily   metFORMIN (GLUCOPHAGE-XR) 500 mg 24 hr tablet   No No   Sig: TAKE 2 TABLETS BY MOUTH TWICE A DAY WITH FOOD   nystatin (MYCOSTATIN) cream   Yes No   Sig: Apply topically 2 (two) times a day   rosuvastatin (CRESTOR) 5 mg tablet   Yes No   Sig: Take 5 mg by mouth daily   vitamin B-12 (VITAMIN B-12) 1,000 mcg tablet   Yes No   Sig: Take 10,000 mcg by mouth daily   zolpidem (AMBIEN) 5 mg tablet   Yes No   Sig: Take 2 5 mg by mouth daily at bedtime as needed for sleep      Facility-Administered Medications: None       Past Medical History:   Diagnosis Date    Acid reflux     CKD (chronic kidney disease), stage II     Diabetes mellitus (HCC)     Essential hypertension     Hyperlipidemia     Hypertension     Type 2 diabetes mellitus ([address] )        Past Surgical History:   Procedure Laterality Date    ANKLE SURGERY  Nov 2019    ARTHROSCOPIC REPAIR ACL Right     knee    BARIATRIC SURGERY      gastric sleeve    GALLBLADDER SURGERY      SKIN BIOPSY      scalp    TENDON REPAIR Left     ankle    TONSILLECTOMY         Family History   Problem Relation Age of Onset    Hypertension Mother     Diabetes type II Father     Diabetes Father     Diabetes type II Paternal Grandmother     Cancer Paternal Grandmother     Breast cancer Paternal Grandmother 79    Breast cancer Paternal Aunt 76     I have reviewed and agree with the history as documented  E-Cigarette/Vaping    E-Cigarette Use Never User      E-Cigarette/Vaping Substances     Social History     Tobacco Use    Smoking status: Never Smoker    Smokeless tobacco: Never Used   Vaping Use    Vaping Use: Never used   Substance Use Topics    Alcohol use: Yes     Alcohol/week: 5 0 standard drinks     Types: 5 Glasses of wine per week     Comment: occ   Drug use: Never       Review of Systems   Constitutional: Negative for chills and fever  HENT: Negative for ear pain and sore throat  Eyes: Negative for redness and visual disturbance  Respiratory: Negative for cough and shortness of breath  Cardiovascular: Negative for chest pain  Gastrointestinal: Positive for nausea  Negative for abdominal pain, diarrhea and vomiting  Genitourinary: Negative for dysuria and hematuria  Musculoskeletal: Negative for back pain, neck pain and neck stiffness  Skin: Negative for color change and rash  Neurological: Positive for dizziness  Negative for light-headedness and headaches  All other systems reviewed and are negative  Physical Exam  Physical Exam  Vitals and nursing note reviewed  Constitutional:       General: She is not in acute distress  Appearance: She is well-developed  She is not ill-appearing or toxic-appearing  HENT:      Head: Normocephalic and atraumatic  Right Ear: Tympanic membrane, ear canal and external ear normal       Left Ear: Tympanic membrane, ear canal and external ear normal       Mouth/Throat:      Pharynx: Uvula midline     Eyes:      General: Lids are normal       Conjunctiva/sclera: Conjunctivae normal    Cardiovascular:      Rate and Rhythm: Normal rate and regular rhythm  Heart sounds: Normal heart sounds  Pulmonary:      Effort: Pulmonary effort is normal       Breath sounds: Normal breath sounds  Abdominal:      General: There is no distension  Palpations: Abdomen is soft  Tenderness: There is no abdominal tenderness  Musculoskeletal:      Cervical back: Normal range of motion and neck supple  Skin:     General: Skin is warm and dry  Neurological:      Mental Status: She is alert and oriented to person, place, and time  Cranial Nerves: Cranial nerves are intact  Sensory: Sensation is intact  Motor: Motor function is intact  Coordination: Coordination is intact           Vital Signs  ED Triage Vitals [07/16/22 0824]   Temperature Pulse Respirations Blood Pressure SpO2   97 6 °F (36 4 °C) 73 16 (!) 171/77 98 %      Temp Source Heart Rate Source Patient Position - Orthostatic VS BP Location FiO2 (%)   Oral Monitor Sitting Right arm --      Pain Score       --           Vitals:    07/16/22 0824 07/16/22 0930 07/16/22 1030 07/16/22 1133   BP: (!) 171/77 152/72 155/77 135/64   Pulse: 73 60 64 64   Patient Position - Orthostatic VS: Sitting Sitting Sitting Sitting         Visual Acuity  Visual Acuity    Flowsheet Row Most Recent Value   L Pupil Size (mm) 3   R Pupil Size (mm) 3          ED Medications  Medications   meclizine (ANTIVERT) tablet 25 mg (25 mg Oral Given 7/16/22 0855)   sodium chloride 0 9 % bolus 1,000 mL (0 mL Intravenous Stopped 7/16/22 0955)   metoclopramide (REGLAN) injection 10 mg (10 mg Intravenous Given 7/16/22 1025)       Diagnostic Studies  Results Reviewed     Procedure Component Value Units Date/Time    HS Troponin I 2hr [[id number]]  (Normal) Collected: 07/16/22 1057    Lab Status: Final result Specimen: Blood from Arm, Right Updated: 07/16/22 1127     hs TnI 2hr 2 ng/L      Delta 2hr hsTnI 0 ng/L     HS Troponin 0hr (reflex protocol) [[id number]]  (Normal) Collected: 07/16/22 0858    Lab Status: Final result Specimen: Blood from Arm, Right Updated: 07/16/22 0933     hs TnI 0hr 2 ng/L     Comprehensive metabolic panel [[id number]]  (Abnormal) Collected: 07/16/22 0858    Lab Status: Final result Specimen: Blood from Arm, Right Updated: 07/16/22 0928     Sodium 136 mmol/L      Potassium 4 2 mmol/L      Chloride 101 mmol/L      CO2 27 mmol/L      ANION GAP 8 mmol/L      BUN 21 mg/dL      Creatinine 0 67 mg/dL      Glucose 165 mg/dL      Calcium 9 8 mg/dL      AST 15 U/L      ALT 19 U/L      Alkaline Phosphatase 52 U/L      Total Protein 7 3 g/dL      Albumin 4 3 g/dL      Total Bilirubin 0 58 mg/dL      eGFR 95 ml/min/1 73sq m     Narrative:      National Kidney Disease Foundation guidelines for Chronic Kidney Disease (CKD):     Stage 1 with normal or high GFR (GFR > 90 mL/min/1 73 square meters)    Stage 2 Mild CKD (GFR = 60-89 mL/min/1 73 square meters)    Stage 3A Moderate CKD (GFR = 45-59 mL/min/1 73 square meters)    Stage 3B Moderate CKD (GFR = 30-44 mL/min/1 73 square meters)    Stage 4 Severe CKD (GFR = 15-29 mL/min/1 73 square meters)    Stage 5 End Stage CKD (GFR <15 mL/min/1 73 square meters)  Note: GFR calculation is accurate only with a steady state creatinine    CBC and differential [[id number]] Collected: 07/16/22 0858    Lab Status: Final result Specimen: Blood from Arm, Right Updated: 07/16/22 0906     WBC 5 96 Thousand/uL      RBC 4 58 Million/uL      Hemoglobin 14 1 g/dL      Hematocrit 43 1 %      MCV 94 fL      MCH 30 8 pg      MCHC 32 7 g/dL      RDW 13 1 %      MPV 10 8 fL      Platelets 769 Thousands/uL      nRBC 0 /100 WBCs      Neutrophils Relative 70 %      Immat GRANS % 1 %      Lymphocytes Relative 19 %      Monocytes Relative 7 %      Eosinophils Relative 2 %      Basophils Relative 1 %      Neutrophils Absolute 4 22 Thousands/µL      Immature Grans Absolute 0 04 Thousand/uL      Lymphocytes Absolute 1 15 Thousands/µL      Monocytes Absolute 0 43 Thousand/µL      Eosinophils Absolute 0 09 Thousand/µL      Basophils Absolute 0 03 Thousands/µL                  CT head without contrast   Final Result by Porfirio Cook DO (07/16 0930)      No acute intracranial abnormality  Workstation performed: [id number]                    Procedures  ECG 12 Lead Documentation Only    Date/Time: 7/16/2022 5:46 PM  Performed by: Galdino Wagner PA-C  Authorized by: Vy Shay PA-C     Comments:      Normal sinus rhythm at 61  Normal axis  No acute ST-T changes  No previous EKG available for comparison  ED Course  ED Course as of 07/17/22 0816   Sat Jul 16, 2022   4832 Updated patient on results thus far  She states she has been starting to feel better, although did get recurrence of symptoms when she was laid flat to do the CT scan  1014 Patient is ambulating back and forth to the bathroom  She states the vertigo has significantly improved, although she continues to report feeling slightly "woozy" and nauseated  1113 Patient reports feeling significantly better this time  She would like to be discharged home  She states nausea has significantly improved  She is ambulating without issue  Kristenthimaeve hsTnI: 0                               SBIRT 22yo+    Flowsheet Row Most Recent Value   SBIRT (23 yo +)    In order to provide better care to our patients, we are screening all of our patients for alcohol and drug use  Would it be okay to ask you these screening questions? No Filed at: 07/16/2022 2820   Initial Alcohol Screen: US AUDIT-C     1  How often do you have a drink containing alcohol? 1 Filed at: 07/16/2022 0905   2  How many drinks containing alcohol do you have on a typical day you are drinking? 0 Filed at: 07/16/2022 0905   3b  FEMALE Any Age, or MALE 65+: How often do you have 4 or more drinks on one occassion? 0 Filed at: 07/16/2022 0905   Audit-C Score 1 Filed at: 07/16/2022 1548   JANETH: How many times in the past year have you        Used an illegal drug or used a prescription medication for non-medical reasons? Never Filed at: 07/16/2022 9763                    [facility]  Number of Diagnoses or Management Options  Vertigo: new and requires workup  Diagnosis management comments: Patient presents for evaluation of vertigo with associated nausea that started when she woke up from sleep this morning  Patient denies prior history of vertigo  Patient does endorse recent history of tinnitus of her left ear for approximately 1 month  Differential includes but is not limited to BPPV versus labyrinthitis versus Meniere's versus vestibular neuritis versus TIA versus CVA  Upon my initial evaluation, the patient is minimally symptomatic  She does get symptoms when you lay her flat  She is able to get up and ambulate with a steady gait  Overall, exam is unremarkable  There is no focal neurologic deficit  I have very high suspicion for peripheral vertigo, possibly Meniere's disease based on patient's reported tinnitus  She is denying any significant hearing loss that she is aware of, but she does state that her  has been commenting on it recently  However, as the patient does not have history of vertigo, the decision was ultimately made to obtain imaging  Labs, EKG and CT of head were ordered  Meclizine was ordered for symptoms as well as IV fluids  Labs reviewed and overall unremarkable  The patient had 2 normal troponin levels well in the ED today  CT of head does not show any acute findings  On re-evaluation, the patient reported significant improvement of vertigo, however she does have some ongoing nausea  Patient was given a dose of Reglan, after which she reported almost complete resolution of symptoms  She is up and moving around without issue  High suspicion remains for peripheral vertigo, possibly Meniere's disease  Will give patient follow-up with ENT  Will prescribe a course of meclizine for home      Strict return to ED precautions were discussed with the patient  Patient is stable for discharge  She is ambulating with a steady gait at the time of discharge  She is tolerating p o  Intake  Amount and/or Complexity of Data Reviewed  Clinical lab tests: ordered and reviewed  Tests in the radiology section of CPT®: ordered and reviewed  Decide to obtain previous medical records or to obtain history from someone other than the patient: yes  Review and summarize past medical records: yes    Risk of Complications, Morbidity, and/or Mortality  Presenting problems: low  Diagnostic procedures: low  Management options: low    Patient Progress  Patient progress: stable      Disposition  Final diagnoses:   Vertigo     Time reflects when diagnosis was documented in both MDM as applicable and the Disposition within this note     Time User Action Codes Description Comment    7/16/2022 11:38 AM Edger Must Add [R42] Vertigo       ED Disposition     ED Disposition   Discharge    Condition   Stable    Date/Time   Sat Jul 16, 2022 11:38 AM    Comment   Zuly Queen discharge to home/self care  Follow-up Information     Follow up With Specialties Details Why Contact Info Additional Information    Brendan Barrera MD Otolaryngology Schedule an appointment as soon as possible for a visit in 1 week  [address] [facility] [address]    [address] [facility] Emergency Medicine  If symptoms worsen [address] Emergency Department, [address], [place], [address] [facility], [facility]          Discharge Medication List as of 7/16/2022 11:39 AM      START taking these medications    Details   meclizine (ANTIVERT) 25 mg tablet Take 1 tablet (25 mg total) by mouth 3 (three) times a day as needed for dizziness, Starting Sat 7/16/2022, Normal      ondansetron (ZOFRAN) 4 mg tablet Take 1 tablet (4 mg total) by mouth every 6 (six) hours, Starting Sat 7/16/2022, Normal         CONTINUE these medications which have NOT CHANGED    Details   Biotin 1000 MCG CHEW Chew 6,000 mcg, Historical Med      Calcium 500 MG tablet Take by mouth, Starting Wed 1/21/2015, Historical Med      Cholecalciferol (VITAMIN D3 SUPER STRENGTH) 50 MCG (2000 UT) CAPS Take 1,000 Units by mouth , Historical Med      Continuous Blood Gluc  (FREESTYLE FRANKI 14 DAY READER) SUZY by Does not apply route, Historical Med      Continuous Blood Gluc Sensor (FreeStyle Franki 14 Day Sensor) MISC Use 1 Device every 14 (fourteen) days, Starting Wed 8/18/2021, Normal      cyclobenzaprine (FLEXERIL) 10 mg tablet Take 1 tablet (10 mg total) by mouth daily at bedtime, Starting Wed 3/10/2021, Normal      Farxiga 10 MG TABS TAKE 1 TABLET BY MOUTH EVERY DAY, Normal      glipiZIDE (GLUCOTROL XL) 2 5 mg 24 hr tablet TAKE 1 TABLET BY MOUTH EVERY DAY, Normal      lansoprazole (PREVACID) 15 mg capsule Take 15 mg by mouth daily, Historical Med      lisinopril (ZESTRIL) 2 5 mg tablet Take 2 5 mg by mouth daily, Historical Med      metFORMIN (GLUCOPHAGE-XR) 500 mg 24 hr tablet TAKE 2 TABLETS BY MOUTH TWICE A DAY WITH FOOD, Normal      Multiple Vitamins-Minerals (MULTIVITAMIN WITH MINERALS) tablet Take 1 tablet by mouth daily, Historical Med      nystatin (MYCOSTATIN) cream Apply topically 2 (two) times a day, Historical Med      Oxymetazoline HCl (RHOFADE) 1 % CREA Apply topically, Historical Med      Probiotic Product (ALIGN) 4 MG CAPS Take by mouth, Historical Med      rosuvastatin (CRESTOR) 5 mg tablet Take 5 mg by mouth daily, Historical Med      vitamin B-12 (VITAMIN B-12) 1,000 mcg tablet Take 10,000 mcg by mouth daily, Historical Med      zolpidem (AMBIEN) 5 mg tablet Take 2 5 mg by mouth daily at bedtime as needed for sleep, Historical Med             No discharge procedures on file      PDMP Review       Value Time User    PDMP Reviewed  Yes 3/10/2021  1:38 AM Sydney Sheppard MD          ED Provider  Electronically Signed by           Preston Linton PA-C  07/17/22 6549

## 2022-07-17 LAB
ATRIAL RATE: 61 BPM
P AXIS: 37 DEGREES
PR INTERVAL: 176 MS
QRS AXIS: 9 DEGREES
QRSD INTERVAL: 84 MS
QT INTERVAL: 418 MS
QTC INTERVAL: 420 MS
T WAVE AXIS: 44 DEGREES
VENTRICULAR RATE: 61 BPM

## 2022-07-17 PROCEDURE — 93010 ELECTROCARDIOGRAM REPORT: CPT | Performed by: INTERNAL MEDICINE

## 2022-07-25 PROBLEM — H93.12 TINNITUS OF LEFT EAR: Status: ACTIVE | Noted: 2022-07-25

## 2022-07-25 PROBLEM — R42 VERTIGO: Status: ACTIVE | Noted: 2022-07-25

## 2022-08-01 ENCOUNTER — HOSPITAL ENCOUNTER (OUTPATIENT)
Dept: RADIOLOGY | Facility: IMAGING CENTER | Age: 62
Discharge: HOME | End: 2022-08-01
Payer: COMMERCIAL

## 2022-08-01 DIAGNOSIS — R26.89 IMBALANCE: ICD-10-CM

## 2022-08-01 DIAGNOSIS — H93.12 TINNITUS OF LEFT EAR: ICD-10-CM

## 2022-08-01 DIAGNOSIS — R51.9 NONINTRACTABLE HEADACHE, UNSPECIFIED CHRONICITY PATTERN, UNSPECIFIED HEADACHE TYPE: ICD-10-CM

## 2022-08-01 DIAGNOSIS — Z86.69 HISTORY OF MIGRAINE: ICD-10-CM

## 2022-08-01 PROCEDURE — G1004 CDSM NDSC: HCPCS

## 2022-08-01 PROCEDURE — 70553 MRI BRAIN STEM W/O & W/DYE: CPT

## 2022-08-01 PROCEDURE — A9585 GADOBUTROL INJECTION: HCPCS | Performed by: PHYSICIAN ASSISTANT

## 2022-08-01 RX ADMIN — GADOBUTROL 12 ML: 604.72 INJECTION INTRAVENOUS at 08:26

## 2022-08-11 ENCOUNTER — APPOINTMENT (OUTPATIENT)
Dept: MRI IMAGING | Facility: HOSPITAL | Age: 62
End: 2022-08-11
Payer: COMMERCIAL

## 2022-08-11 ENCOUNTER — APPOINTMENT (OUTPATIENT)
Dept: NON INVASIVE DIAGNOSTICS | Facility: HOSPITAL | Age: 62
End: 2022-08-11
Payer: COMMERCIAL

## 2022-08-11 ENCOUNTER — APPOINTMENT (EMERGENCY)
Dept: CT IMAGING | Facility: HOSPITAL | Age: 62
End: 2022-08-11
Payer: COMMERCIAL

## 2022-08-11 ENCOUNTER — HOSPITAL ENCOUNTER (OUTPATIENT)
Facility: HOSPITAL | Age: 62
Setting detail: OBSERVATION
Discharge: HOME | End: 2022-08-12
Attending: EMERGENCY MEDICINE | Admitting: HOSPITALIST
Payer: COMMERCIAL

## 2022-08-11 DIAGNOSIS — G45.9 TIA (TRANSIENT ISCHEMIC ATTACK): Primary | ICD-10-CM

## 2022-08-11 DIAGNOSIS — R42 VERTIGO: ICD-10-CM

## 2022-08-11 DIAGNOSIS — R51.9 HEADACHE: ICD-10-CM

## 2022-08-11 PROBLEM — R20.2 NUMBNESS AND TINGLING OF LEFT SIDE OF FACE: Status: ACTIVE | Noted: 2022-08-11

## 2022-08-11 PROBLEM — R20.0 NUMBNESS AND TINGLING OF LEFT SIDE OF FACE: Status: ACTIVE | Noted: 2022-08-11

## 2022-08-11 LAB
ANION GAP SERPL CALCULATED.3IONS-SCNC: 9 MMOL/L (ref 4–13)
APTT PPP: 26 SECONDS (ref 23–37)
BUN SERPL-MCNC: 18 MG/DL (ref 5–25)
CALCIUM SERPL-MCNC: 9.9 MG/DL (ref 8.4–10.2)
CHLORIDE SERPL-SCNC: 103 MMOL/L (ref 96–108)
CO2 SERPL-SCNC: 25 MMOL/L (ref 21–32)
CREAT SERPL-MCNC: 0.73 MG/DL (ref 0.6–1.3)
ERYTHROCYTE [DISTWIDTH] IN BLOOD BY AUTOMATED COUNT: 12.8 % (ref 11.6–15.1)
GFR SERPL CREATININE-BSD FRML MDRD: 89 ML/MIN/1.73SQ M
GLUCOSE SERPL-MCNC: 108 MG/DL (ref 65–140)
GLUCOSE SERPL-MCNC: 123 MG/DL (ref 65–140)
GLUCOSE SERPL-MCNC: 132 MG/DL (ref 65–140)
GLUCOSE SERPL-MCNC: 150 MG/DL (ref 65–140)
HCT VFR BLD AUTO: 43.6 % (ref 34.8–46.1)
HGB BLD-MCNC: 14.3 G/DL (ref 11.5–15.4)
INR PPP: 0.93 (ref 0.84–1.19)
MCH RBC QN AUTO: 30.8 PG (ref 26.8–34.3)
MCHC RBC AUTO-ENTMCNC: 32.8 G/DL (ref 31.4–37.4)
MCV RBC AUTO: 94 FL (ref 82–98)
PLATELET # BLD AUTO: 199 THOUSANDS/UL (ref 149–390)
PMV BLD AUTO: 12 FL (ref 8.9–12.7)
POTASSIUM SERPL-SCNC: 3.9 MMOL/L (ref 3.5–5.3)
PROTHROMBIN TIME: 12.6 SECONDS (ref 11.6–14.5)
RBC # BLD AUTO: 4.64 MILLION/UL (ref 3.81–5.12)
SODIUM SERPL-SCNC: 137 MMOL/L (ref 135–147)
WBC # BLD AUTO: 5.56 THOUSAND/UL (ref 4.31–10.16)

## 2022-08-11 PROCEDURE — 83036 HEMOGLOBIN GLYCOSYLATED A1C: CPT | Performed by: PHYSICIAN ASSISTANT

## 2022-08-11 PROCEDURE — 80048 BASIC METABOLIC PNL TOTAL CA: CPT | Performed by: EMERGENCY MEDICINE

## 2022-08-11 PROCEDURE — 80061 LIPID PANEL: CPT | Performed by: PHYSICIAN ASSISTANT

## 2022-08-11 PROCEDURE — 85610 PROTHROMBIN TIME: CPT | Performed by: EMERGENCY MEDICINE

## 2022-08-11 PROCEDURE — 85730 THROMBOPLASTIN TIME PARTIAL: CPT | Performed by: EMERGENCY MEDICINE

## 2022-08-11 PROCEDURE — 36415 COLL VENOUS BLD VENIPUNCTURE: CPT | Performed by: EMERGENCY MEDICINE

## 2022-08-11 PROCEDURE — 70551 MRI BRAIN STEM W/O DYE: CPT

## 2022-08-11 PROCEDURE — 93306 TTE W/DOPPLER COMPLETE: CPT | Performed by: INTERNAL MEDICINE

## 2022-08-11 PROCEDURE — 70496 CT ANGIOGRAPHY HEAD: CPT

## 2022-08-11 PROCEDURE — 99220 PR INITIAL OBSERVATION CARE/DAY 70 MINUTES: CPT | Performed by: HOSPITALIST

## 2022-08-11 PROCEDURE — 82948 REAGENT STRIP/BLOOD GLUCOSE: CPT

## 2022-08-11 PROCEDURE — 99285 EMERGENCY DEPT VISIT HI MDM: CPT | Performed by: EMERGENCY MEDICINE

## 2022-08-11 PROCEDURE — 99285 EMERGENCY DEPT VISIT HI MDM: CPT

## 2022-08-11 PROCEDURE — G1004 CDSM NDSC: HCPCS

## 2022-08-11 PROCEDURE — 93005 ELECTROCARDIOGRAM TRACING: CPT

## 2022-08-11 PROCEDURE — 99244 OFF/OP CNSLTJ NEW/EST MOD 40: CPT | Performed by: PSYCHIATRY & NEUROLOGY

## 2022-08-11 PROCEDURE — 85027 COMPLETE CBC AUTOMATED: CPT | Performed by: EMERGENCY MEDICINE

## 2022-08-11 PROCEDURE — 96365 THER/PROPH/DIAG IV INF INIT: CPT

## 2022-08-11 PROCEDURE — 93306 TTE W/DOPPLER COMPLETE: CPT

## 2022-08-11 PROCEDURE — 70498 CT ANGIOGRAPHY NECK: CPT

## 2022-08-11 RX ORDER — PRAVASTATIN SODIUM 40 MG
40 TABLET ORAL
Status: DISCONTINUED | OUTPATIENT
Start: 2022-08-11 | End: 2022-08-12 | Stop reason: HOSPADM

## 2022-08-11 RX ORDER — ACETAMINOPHEN 325 MG/1
650 TABLET ORAL ONCE
Status: COMPLETED | OUTPATIENT
Start: 2022-08-11 | End: 2022-08-11

## 2022-08-11 RX ORDER — ENOXAPARIN SODIUM 100 MG/ML
40 INJECTION SUBCUTANEOUS 2 TIMES DAILY
Status: DISCONTINUED | OUTPATIENT
Start: 2022-08-11 | End: 2022-08-12 | Stop reason: HOSPADM

## 2022-08-11 RX ORDER — ASPIRIN 81 MG/1
324 TABLET, CHEWABLE ORAL ONCE
Status: COMPLETED | OUTPATIENT
Start: 2022-08-11 | End: 2022-08-11

## 2022-08-11 RX ORDER — MECLIZINE HYDROCHLORIDE 25 MG/1
25 TABLET ORAL 3 TIMES DAILY PRN
Status: DISCONTINUED | OUTPATIENT
Start: 2022-08-11 | End: 2022-08-12 | Stop reason: HOSPADM

## 2022-08-11 RX ORDER — INSULIN LISPRO 100 [IU]/ML
2-12 INJECTION, SOLUTION INTRAVENOUS; SUBCUTANEOUS
Status: DISCONTINUED | OUTPATIENT
Start: 2022-08-11 | End: 2022-08-12 | Stop reason: HOSPADM

## 2022-08-11 RX ORDER — PANTOPRAZOLE SODIUM 20 MG/1
20 TABLET, DELAYED RELEASE ORAL
Status: DISCONTINUED | OUTPATIENT
Start: 2022-08-12 | End: 2022-08-12 | Stop reason: HOSPADM

## 2022-08-11 RX ORDER — ZOLPIDEM TARTRATE 5 MG/1
2.5 TABLET ORAL
Status: DISCONTINUED | OUTPATIENT
Start: 2022-08-11 | End: 2022-08-12 | Stop reason: HOSPADM

## 2022-08-11 RX ORDER — AMITRIPTYLINE HYDROCHLORIDE 10 MG/1
10 TABLET, FILM COATED ORAL
Status: DISCONTINUED | OUTPATIENT
Start: 2022-08-11 | End: 2022-08-12 | Stop reason: HOSPADM

## 2022-08-11 RX ORDER — ASPIRIN 81 MG/1
81 TABLET, CHEWABLE ORAL DAILY
Status: DISCONTINUED | OUTPATIENT
Start: 2022-08-12 | End: 2022-08-12 | Stop reason: HOSPADM

## 2022-08-11 RX ADMIN — SODIUM CHLORIDE, SODIUM LACTATE, POTASSIUM CHLORIDE, AND CALCIUM CHLORIDE 500 ML: .6; .31; .03; .02 INJECTION, SOLUTION INTRAVENOUS at 10:33

## 2022-08-11 RX ADMIN — PERFLUTREN 3 ML/MIN: 6.52 INJECTION, SUSPENSION INTRAVENOUS at 15:40

## 2022-08-11 RX ADMIN — AMITRIPTYLINE HYDROCHLORIDE 10 MG: 10 TABLET, FILM COATED ORAL at 21:08

## 2022-08-11 RX ADMIN — ASPIRIN 324 MG: 81 TABLET, CHEWABLE ORAL at 12:07

## 2022-08-11 RX ADMIN — ACETAMINOPHEN 650 MG: 325 TABLET, FILM COATED ORAL at 10:14

## 2022-08-11 RX ADMIN — ZOLPIDEM TARTRATE 2.5 MG: 5 TABLET ORAL at 21:12

## 2022-08-11 RX ADMIN — IOHEXOL 70 ML: 350 INJECTION, SOLUTION INTRAVENOUS at 10:50

## 2022-08-11 RX ADMIN — ENOXAPARIN SODIUM 40 MG: 40 INJECTION SUBCUTANEOUS at 21:07

## 2022-08-11 NOTE — ASSESSMENT & PLAN NOTE
 Patient presents with sudden onset of left-sided facial numbness and tingling as well as vertigo and left-sided tinnitus  Presented to the ER approximately 1 month ago with left-sided tinnitus and vertigo but no numbness or tingling of her face  She was referred to ENT at that time  Started on amitriptyline with improvement of her symptoms up until this morning   CT:  No acute abnormalities   CTA:  No stenosis, large vessel occlusion or aneurysm noted   Discussed with neurology, they will see in consultation  They are recommending starting Stroke Pathway  o Frequent vital signs  o Neuro checks  o Telemetry  o Check lipid panel and A1c   o Start aspirin  Continue statin  She is intolerant of high-intensity statins in the past    Obtain MRI brain   This may be vestibular migraine     Admit for observation

## 2022-08-11 NOTE — ASSESSMENT & PLAN NOTE
 Patient presented with sudden onset of left-sided facial numbness and tingling as well as vertigo and left-sided tinnitus  Presented to the ER approximately 1 month ago with left-sided tinnitus and vertigo but no numbness or tingling of her face  She was referred to ENT at that time  Started on amitriptyline with improvement of her symptoms up until this admission   CT:  No acute abnormalities   CTA:  No stenosis, large vessel occlusion or aneurysm noted     Appreciate Neuro consultation   MRI brain unremarkable   2D echocardiogram also unremarkable

## 2022-08-11 NOTE — H&P
[address] [facility] 1960, 65 y o  female MRN: [id number]  Unit/Bed#: ED 26 Encounter: [id number]  Primary Care Provider: Jailene Holguin MD   Date and time admitted to hospital: 8/11/2022  8:51 AM    * Numbness and tingling of left side of face  Assessment & Plan   Patient presents with sudden onset of left-sided facial numbness and tingling as well as vertigo and left-sided tinnitus  Presented to the ER approximately 1 month ago with left-sided tinnitus and vertigo but no numbness or tingling of her face  She was referred to ENT at that time  Started on amitriptyline with improvement of her symptoms up until this morning   CT:  No acute abnormalities   CTA:  No stenosis, large vessel occlusion or aneurysm noted   Discussed with neurology, they will see in consultation  They are recommending starting Stroke Pathway  o Frequent vital signs  o Neuro checks  o Telemetry  o Check lipid panel and A1c   o Start aspirin  Continue statin  She is intolerant of high-intensity statins in the past    Obtain MRI brain   Check TTE   This may be vestibular migraine   Admit for observation    Tinnitus of left ear  Assessment & Plan  · Recent ER visit approximately 1 month ago for same  Has been under control until this morning  · Continue ENT and vestibular therapy  · Continue amitriptyline    Type 2 diabetes mellitus with hyperglycemia, without long-term current use of insulin [facility])  Assessment & Plan  Lab Results   Component Value Date    HGBA1C 7 3 (H) 04/26/2022       Recent Labs     08/11/22  0901 08/11/22  1210   POCGLU 150* 123       Blood Sugar Average: Last 72 hrs:  (P) 136 5   · A1c in April of this year 7 3  · Will recheck A1c given stroke pathway  · Would hold metformin, glipizide, Farxiga for now and place on sliding scale coverage as inpatient      Mixed hyperlipidemia  Assessment & Plan  · Rechecking fasting lipid panel given CVA pathway  · Intolerant of high-intensity statin, atorvastatin 40 mg in the past   · Would continue with Crestor 5 mg daily upon discharge    Essential hypertension  Assessment & Plan  · BP of 127/72 upon admission  · Hold lisinopril while inpatient for permissive hypertension  · If MRI is normal goal for normotension    VTE Pharmacologic Prophylaxis: VTE Score: 4 Moderate Risk (Score 3-4) - Pharmacological DVT Prophylaxis Ordered: enoxaparin (Lovenox)  Code Status: full code  Discussion with family: Updated  () at bedside  Anticipated Length of Stay: Patient will be admitted on an observation basis with an anticipated length of stay of less than 2 midnights secondary to facial numbness CVA pathway  Total Time for Visit, including Counseling / Coordination of Care: 70 minutes Greater than 50% of this total time spent on direct patient counseling and coordination of care  Chief Complaint: facial numbness    History of Present Illness:  Marcin Bajwa is a 65 y o  female with a PMH of hypertension, hyperlipidemia, vertigo, left-sided tinnitus, type 2 diabetes who presents with sudden onset of left sided facial numbness and tingling with left-sided tinnitus and vertigo  Additionally complains of a headache  Reported this started all decided this morning upon waking  Had an ER visit 07/16 for left-sided vertigo and tinnitus  She was referred to ENT and audiology in the outpatient setting  She has been on amitriptyline which did improve her symptoms up until this morning  At that time she did not have any numbness or tingling of her face  However upon waking this morning she had similar symptoms to 1 month ago with the addition of left-sided facial numbness and tingling  No noticeable facial droop  Denies any arm or leg weakness  With a mild headache noted  No recent illnesses, sick contacts, fevers, chills  Denies any visual disturbances presently  No head trauma      Review of Systems:  Review of Systems   Constitutional: Negative for activity change, appetite change, chills, fatigue and fever  HENT: Positive for tinnitus  Negative for congestion, rhinorrhea, sinus pressure and sore throat  Eyes: Negative for photophobia, pain and visual disturbance  Respiratory: Negative for cough, shortness of breath and wheezing  Cardiovascular: Negative for chest pain, palpitations and leg swelling  Gastrointestinal: Negative for abdominal distention, abdominal pain, constipation, diarrhea, nausea and vomiting  Endocrine: Negative for cold intolerance, heat intolerance, polydipsia and polyuria  Genitourinary: Negative for difficulty urinating, dysuria, flank pain, frequency and hematuria  Musculoskeletal: Negative for arthralgias, back pain and joint swelling  Skin: Negative for color change, pallor and rash  Allergic/Immunologic: Negative  Neurological: Positive for numbness and headaches  Negative for dizziness, syncope, weakness and light-headedness  Hematological: Negative  Psychiatric/Behavioral: Negative  Past Medical and Surgical History:   Past Medical History:   Diagnosis Date    Acid reflux     CKD (chronic kidney disease), stage II     Diabetes mellitus (HCC)     Dizziness     Essential hypertension     Hyperlipidemia     Hypertension     Type 2 diabetes mellitus (HCC)        Past Surgical History:   Procedure Laterality Date    ANKLE SURGERY  Nov 2019    ARTHROSCOPIC REPAIR ACL Right     knee    BARIATRIC SURGERY      gastric sleeve    GALLBLADDER SURGERY      SKIN BIOPSY      scalp    TENDON REPAIR Left     ankle    TONSILLECTOMY         Meds/Allergies:  Prior to Admission medications    Medication Sig Start Date End Date Taking?  Authorizing Provider   amitriptyline (ELAVIL) 10 mg tablet Take 1 tablet (10 mg total) by mouth daily at bedtime 8/3/22   Keven Dos Santos PA-C   Biotin 1000 MCG CHEW Chew 6,000 mcg    Historical Provider, MD   Calcium 500 MG tablet Take by mouth 1/21/15   Historical Provider, MD   Cholecalciferol 50 MCG (2000 UT) CAPS Take 1,000 Units by mouth     Historical Provider, MD   Continuous Blood Gluc  (FREESTYLE ANKIT 14 DAY READER) SUZY by Does not apply route    Historical Provider, MD   Continuous Blood Gluc Sensor (FreeStyle Ankit 14 Day Sensor) MISC USE 1 DEVICE EVERY 14 (FOURTEEN) DAYS 8/10/22   Brenda aMry PA-C   cyclobenzaprine (FLEXERIL) 10 mg tablet Take 1 tablet (10 mg total) by mouth daily at bedtime  Patient not taking: No sig reported 3/10/21   Juju Horowitz DO   Farxiga 10 MG TABS TAKE 1 TABLET BY MOUTH EVERY DAY 5/4/22   Walter Lee PA-C   glipiZIDE (GLUCOTROL XL) 2 5 mg 24 hr tablet TAKE 1 TABLET BY MOUTH EVERY DAY 4/4/22   Brigid Cote MD   lansoprazole (PREVACID) 15 mg capsule Take 15 mg by mouth daily    Historical Provider, MD   lisinopril (ZESTRIL) 2 5 mg tablet Take 2 5 mg by mouth daily    Historical Provider, MD   meclizine (ANTIVERT) 25 mg tablet Take 1 tablet (25 mg total) by mouth 3 (three) times a day as needed for dizziness  Patient not taking: Reported on 7/25/2022 7/16/22   Vy Chowdhury PA-C   metFORMIN (GLUCOPHAGE-XR) 500 mg 24 hr tablet TAKE 2 TABLETS BY MOUTH TWICE A DAY WITH FOOD 3/11/22   Brigid Cote MD   Multiple Vitamins-Minerals (MULTIVITAMIN WITH MINERALS) tablet Take 1 tablet by mouth daily    Historical Provider, MD   nystatin (MYCOSTATIN) cream Apply topically 2 (two) times a day    Historical Provider, MD   ondansetron (ZOFRAN) 4 mg tablet Take 1 tablet (4 mg total) by mouth every 6 (six) hours 7/16/22   Vy Cruz PA-C   Oxymetazoline HCl 1 % CREA Apply topically    Historical Provider, MD   Probiotic Product (ALIGN) 4 MG CAPS Take by mouth    Historical Provider, MD   rosuvastatin (CRESTOR) 5 mg tablet Take 5 mg by mouth daily    Historical Provider, MD   vitamin B-12 (VITAMIN B-12) 1,000 mcg tablet Take 10,000 mcg by mouth daily    Historical Provider, MD   zolpidem (AMBIEN) 5 mg tablet Take 2 5 mg by mouth daily at bedtime as needed for sleep    Historical Provider, MD     I have reviewed home medications with patient personally  Allergies: Allergies   Allergen Reactions    Ampicillin Hives    Darvon [Propoxyphene] Vomiting    Lipitor [Atorvastatin] Arthralgia    Vioxx [Rofecoxib]        Social History:  Marital Status: /Civil Union   Occupation: non-contributory  Patient Pre-hospital Living Situation: Home  Patient Pre-hospital Level of Mobility: walks  Patient Pre-hospital Diet Restrictions: low carb  Substance Use History:   Social History     Substance and Sexual Activity   Alcohol Use Yes    Alcohol/week: 5 0 standard drinks    Types: 5 Glasses of wine per week    Comment: occ  Social History     Tobacco Use   Smoking Status Never Smoker   Smokeless Tobacco Never Used     Social History     Substance and Sexual Activity   Drug Use Never       Family History:  Family History   Problem Relation Age of Onset    Hypertension Mother     Diabetes type II Father     Diabetes Father     Diabetes type II Paternal Grandmother     Cancer Paternal Grandmother     Breast cancer Paternal Grandmother 79    Breast cancer Paternal Aunt 76       Physical Exam:     Vitals:   Blood Pressure: 127/72 (08/11/22 1140)  Pulse: 64 (08/11/22 1140)  Temperature: 98 1 °F (36 7 °C) (08/11/22 0844)  Temp Source: Oral (08/11/22 0844)  Respirations: 18 (08/11/22 1140)  SpO2: 98 % (08/11/22 1140)    Physical Exam  Vitals and nursing note reviewed  Constitutional:       General: She is not in acute distress  Appearance: Normal appearance  HENT:      Head: Normocephalic and atraumatic  Mouth/Throat:      Mouth: Mucous membranes are moist    Eyes:      General: No scleral icterus  Extraocular Movements: Extraocular movements intact  Pupils: Pupils are equal, round, and reactive to light     Cardiovascular:      Rate and Rhythm: Normal rate and regular rhythm  Heart sounds: Normal heart sounds  No murmur heard  No friction rub  Pulmonary:      Effort: Pulmonary effort is normal       Breath sounds: Normal breath sounds  No wheezing or rhonchi  Abdominal:      General: Bowel sounds are normal  There is no distension  Palpations: Abdomen is soft  Tenderness: There is no abdominal tenderness  There is no guarding  Musculoskeletal:         General: No swelling  Cervical back: Neck supple  Right lower leg: No edema  Left lower leg: No edema  Skin:     General: Skin is warm and dry  Capillary Refill: Capillary refill takes less than 2 seconds  Coloration: Skin is not jaundiced or pale  Neurological:      General: No focal deficit present  Mental Status: She is alert and oriented to person, place, and time  Mental status is at baseline  Additional Data:     Lab Results:  Results from last 7 days   Lab Units 08/11/22  1047   WBC Thousand/uL 5 56   HEMOGLOBIN g/dL 14 3   HEMATOCRIT % 43 6   PLATELETS Thousands/uL 199     Results from last 7 days   Lab Units 08/11/22  1047   SODIUM mmol/L 137   POTASSIUM mmol/L 3 9   CHLORIDE mmol/L 103   CO2 mmol/L 25   BUN mg/dL 18   CREATININE mg/dL 0 73   ANION GAP mmol/L 9   CALCIUM mg/dL 9 9   GLUCOSE RANDOM mg/dL 132     Results from last 7 days   Lab Units 08/11/22  1047   INR  0 93     Results from last 7 days   Lab Units 08/11/22  1210 08/11/22  0901   POC GLUCOSE mg/dl 123 150*               Imaging: Reviewed radiology reports from this admission including: CT head  CTA stroke alert (head/neck)   Final Result by Kumar Weaver MD (08/11 1116)      No significant stenosis of the cervical carotid or vertebral arteries   No high-grade intracranial stenosis, large vessel occlusion or aneurysm     6 mm left upper lobe nodule in retrospect is partially seen on 3/10/2021 and probably unchanged but consider one-year follow-up chest CT to confirm stability  Incidental thyroid nodule(s) for which nonemergent thyroid ultrasound is recommended  Findings were directly discussed with Hari Padron at 10:55 AM       Study marked in Epic for notification and follow-up of incidental findings  Workstation performed: [id number]         CT stroke alert brain   Final Result by Flory Trujillo MD (08/11 1102)      No acute intracranial abnormality  Findings were directly discussed with Hari Padron at 10:55 AM             Workstation performed: [id number]         MRI Inpatient Order    (Results Pending)       EKG and Other Studies Reviewed on Admission:   · EKG: Sinus Bradycardia  HR 59     ** Please Note: This note has been constructed using a voice recognition system   **

## 2022-08-11 NOTE — ASSESSMENT & PLAN NOTE
· BP of 127/72 upon admission  · Hold lisinopril while inpatient for permissive hypertension    · If MRI is normal goal for normotension

## 2022-08-11 NOTE — ED NOTES
Patient is at [facility],[address], [facility]  08/11/22 [id number]

## 2022-08-11 NOTE — ASSESSMENT & PLAN NOTE
· Recent ER visit approximately 1 month ago for same  Has been under control until this morning    · Continue ENT and vestibular therapy  · Continue amitriptyline

## 2022-08-11 NOTE — CONSULTS
Consultation - Stroke   Dominic Larson 65 y o  female MRN: [id number]  Unit/Bed#: ED 26 Encounter: [id number]      Assessment/Plan     * Numbness and tingling of left side of face  Assessment & Plan  65year old female with DM2, HTN, HLD, obesity who presented to the hospital with complaints of L facial numbness, droop and HA  Stroke alert was activated on arrival and patient underwent CTH/CTA head and neck which demonstrated no acute intracranial abnormality  Patient noted symptoms were improving in ED and no droop was noted on exam  Patient was not an IV thrombolytic candidate due to symptoms resolving and were clearly nondisabling  Suspect symptoms secondary to complicated migraine, but given underlying vascular risk factors, recommend admission for stroke work-up  Plan:   - Recommend admit on stroke pathway  - Check MRI brain without contrast    - Check echocardiogram   - Monitor on telemetry  - Check hemoglobin A1c, fasting lipid panel and TSH    - Give loading dose of  mg x1 and Plavix 300 mg x 1 today  Recommend ASA 81 mg QD and Plavix 75 mg QD starting on August 10, 2022  - Atorvastatin 40 mg QPM    - Allow for permissive hypertension with BP goal <220/110  Treat with PRN antihypertensives  - Goal normothermia and euglycemia    - PT/OT   - Stroke education   - Monitor exam and notify with changes      Type 2 diabetes mellitus with hyperglycemia, without long-term current use of insulin [facility])  Assessment & Plan  Lab Results   Component Value Date    HGBA1C 7 3 (H) 04/26/2022       Recent Labs     08/11/22  0901 08/11/22  1210   POCGLU 150* 123       Blood Sugar Average: Last 72 hrs:  (P) 136 5     - Goal euglycemia    - Management as per medicine team     Essential hypertension  Assessment & Plan  - Allow for permissive hypertension, BP goal <220/110    - Management as per medicine team     Mixed hyperlipidemia  Assessment & Plan  - Check fasting lipid panel    - Atorvastatin 40 mg QPM     Tinnitus of left ear  Assessment & Plan  - Ongoing work-up with ENT  Thrombolytic Decision: Patient not a candidate  Symptoms resolved/clearly non disabling  Recommendations for outpatient neurological follow up have yet to be determined  History of Present Illness     Reason for Consult / Principal Problem: Stroke alert  Patient last known well: 0730  Stroke alert called: 26  Neurology time of arrival: Responded immediately in department as neurology team was already present for another stroke alert, but at bedside at 1050  HPI: Vickie Jenkins is a 65 y o   female with DM2, HTN, HLD, obesity who presents with complaint of L facial numbness and headache  She notes she woke up and was in her usual state of health when she woke up this morning but around 0730 when she was doing her morning devotional, she started to have HA as well as associated abnormal sensation of her L cheek  She notes that it did not really feel like a numb sensation, but that it felt abnormal when compared to the R cheek  She notes she felt like her face was drooping and she asked her  to look at it and it did appear as though her L eye was drooping and he notes that her L cheek looked "puffy " She notes she called her PCP and was instructed to come to the ED for evaluation  Stroke alert was activated on arrival and patient underwent urgent work-up  She notes she has never had similar symptoms in the past  She notes she has been having issues with vertigo over the past several weeks and also has persistent tinnitus of her L ear and is being worked up by ENT  Per review of chart, patient was seen by ENT on July 25, 2022 and at that time etiology of vertigo and tinnitus was unclear  She was recommended to have MRI brain and IAC with and without contrast which was completed on August 1,2022 and demonstrated no acute intracranial abnormality   She was started on Amitriptyline 10 mg QHS about 1 week ago to see if her symptoms would improve with treatment of possible vestibular migraine  Patient reports symptoms have improved since this morning  She continues to have very dull, mild headache and also notes that her L cheek feels different than her R cheek, but that this has also improved  Consult to Neurology  Consult performed by: Bradley Branham PA-C  Consult ordered by: Angelika Phillips MD          Review of Systems   Constitutional: Negative for chills, fatigue and fever  HENT: Negative for trouble swallowing  Eyes: Negative for visual disturbance  Respiratory: Negative for shortness of breath  Cardiovascular: Negative for chest pain  Gastrointestinal: Negative for abdominal pain, nausea and vomiting  Genitourinary: Negative for difficulty urinating and dysuria  Musculoskeletal: Positive for neck pain  Negative for back pain and gait problem  Skin: Negative for rash  Neurological: Positive for dizziness, numbness and headaches  Negative for speech difficulty, weakness and light-headedness  Psychiatric/Behavioral: Negative for confusion  Historical Information   Past Medical History:   Diagnosis Date    Acid reflux     CKD (chronic kidney disease), stage II     Diabetes mellitus (HCC)     Dizziness     Essential hypertension     Hyperlipidemia     Hypertension     Type 2 diabetes mellitus (HCC)      Past Surgical History:   Procedure Laterality Date    ANKLE SURGERY  Nov 2019    ARTHROSCOPIC REPAIR ACL Right     knee    BARIATRIC SURGERY      gastric sleeve    GALLBLADDER SURGERY      SKIN BIOPSY      scalp    TENDON REPAIR Left     ankle    TONSILLECTOMY       Social History   Social History     Substance and Sexual Activity   Alcohol Use Yes    Alcohol/week: 5 0 standard drinks    Types: 5 Glasses of wine per week    Comment: occ        Social History     Substance and Sexual Activity   Drug Use Never     E-Cigarette/Vaping    E-Cigarette Use Never User      E-Cigarette/Vaping Substances     Social History     Tobacco Use   Smoking Status Never Smoker   Smokeless Tobacco Never Used     Family History:   Family History   Problem Relation Age of Onset    Hypertension Mother     Diabetes type II Father     Diabetes Father     Diabetes type II Paternal Grandmother     Cancer Paternal Grandmother     Breast cancer Paternal Grandmother 79    Breast cancer Paternal Aunt 76       Review of previous medical records was completed  Please see HPI  Meds/Allergies   Scheduled Meds:  Current Facility-Administered Medications   Medication Dose Route Frequency Provider Last Rate    insulin lispro  2-12 Units Subcutaneous TID AC Vianca Lips, PA-C      insulin lispro  2-12 Units Subcutaneous HS Vianca Lips, PA-C       Continuous Infusions:   PRN Meds:  Allergies   Allergen Reactions    Ampicillin Hives    Darvon [Propoxyphene] Vomiting    Lipitor [Atorvastatin] Arthralgia    Vioxx [Rofecoxib]        Objective   Vitals:Blood pressure 129/62, pulse 64, temperature 98 1 °F (36 7 °C), temperature source Oral, resp  rate 14, SpO2 98 %  ,There is no height or weight on file to calculate BMI  No intake or output data in the 24 hours ending 08/11/22 1209    Invasive Devices: Invasive Devices  Report    Peripheral Intravenous Line  Duration           Peripheral IV 08/11/22 <1 day                Physical Exam  Constitutional:       General: She is not in acute distress  Appearance: Normal appearance  She is obese  She is not ill-appearing, toxic-appearing or diaphoretic  HENT:      Head: Normocephalic and atraumatic  Mouth/Throat:      Mouth: Mucous membranes are moist       Pharynx: Oropharynx is clear  No oropharyngeal exudate or posterior oropharyngeal erythema  Eyes:      General: No scleral icterus  Right eye: No discharge  Left eye: No discharge  Extraocular Movements: Extraocular movements intact        Conjunctiva/sclera: Conjunctivae normal  Pupils: Pupils are equal, round, and reactive to light  Cardiovascular:      Rate and Rhythm: Normal rate and regular rhythm  Pulmonary:      Effort: Pulmonary effort is normal  No respiratory distress  Breath sounds: Normal breath sounds  Abdominal:      General: There is no distension  Palpations: Abdomen is soft  Tenderness: There is no abdominal tenderness  Musculoskeletal:         General: Normal range of motion  Cervical back: Normal range of motion and neck supple  No rigidity or tenderness  Right lower leg: No edema  Left lower leg: No edema  Skin:     General: Skin is warm and dry  Findings: No erythema or rash  Neurological:      Mental Status: She is alert and oriented to person, place, and time  Coordination: Finger-Nose-Finger Test and Heel to [employer] normal    Psychiatric:         Mood and Affect: Mood normal          Speech: Speech normal          Behavior: Behavior normal        Neurologic Exam     Mental Status   Oriented to person, place, and time  Oriented to person  Oriented to place  Oriented to time  Attention: normal  Concentration: normal    Speech: speech is normal   Level of consciousness: alert  Knowledge: good  Able to name object  Able to repeat  Normal comprehension  Cranial Nerves     CN II   Right visual field deficit: none  Left visual field deficit: none     CN III, IV, VI   Pupils are equal, round, and reactive to light  Right pupil: Size: 4 mm  Shape: regular  Reactivity: brisk  Consensual response: intact  Left pupil: Size: 4 mm  Shape: regular  Reactivity: brisk  Consensual response: intact     Nystagmus: none   Diplopia: none  Ophthalmoparesis: none  Upgaze: normal  Downgaze: normal  Conjugate gaze: present    CN V   Right facial sensation deficit: none  Left facial sensation deficit: forehead and cheeks (Noted diminished sensation on L when compared to R with both crude touch and pinprick  )    CN VII Right facial weakness: none  Left facial weakness: none    CN VIII   Hearing: intact    CN IX, X   Palate: symmetric    CN XI   Right sternocleidomastoid strength: normal  Left sternocleidomastoid strength: normal    CN XII   Tongue: not atrophic  Fasciculations: absent  Tongue deviation: none    Motor Exam   Muscle bulk: normal  Overall muscle tone: normal  Right arm tone: normal  Left arm tone: normal  Right arm pronator drift: absent  Left arm pronator drift: absent  Right leg tone: normal  Left leg tone: normalMotor strength 5/5 B/L UE and LE  Sensory Exam   Right arm light touch: normal  Left arm light touch: normal  Right leg light touch: normal  Left leg light touch: normal  Right arm vibration: normal  Left arm vibration: normal  Right leg vibration: normal  Left leg vibration: normal  Right arm pinprick: normal  Left arm pinprick: normal  Right leg pinprick: decreased from knee (Patient notes this is baseline for her s/p knee surgery )  Left leg pinprick: normal    Gait, Coordination, and Reflexes     Gait  Gait: (Deferred for safety )    Coordination   Finger to nose coordination: normal  Heel to shin coordination: normal    Tremor   Resting tremor: absent  Intention tremor: absent  Action tremor: absent    Reflexes   Right plantar: normal  Left plantar: normal      NIHSS:  1a Level of Consciousness: 0 = Alert   1b  LOC Questions: 0 = Answers both correctly   1c  LOC Commands: 0 = Obeys both correctly   2  Best Gaze: 0 = Normal   3  Visual: 0 = No visual field loss   4  Facial Palsy: 0=Normal symmetric movement   5a  Motor Right Arm: 0=No drift, limb holds 90 (or 45) degrees for full 10 seconds   5b  Motor Left Arm: 0=No drift, limb holds 90 (or 45) degrees for full 10 seconds   6a  Motor Right Le=No drift, limb holds 90 (or 45) degrees for full 10 seconds   6b  Motor Left Le=No drift, limb holds 90 (or 45) degrees for full 10 seconds   7  Limb Ataxia:  0=Absent   8   Sensory: 1=Mild to moderate sensory loss; patient feels pinprick is less sharp or is dull on the affected side; there is a loss of superficial pain with pinprick but patient is aware She is being touched   9  Best Language:  0=No aphasia, normal   10  Dysarthria: 0=Normal articulation   11   Extinction and Inattention (formerly Neglect): 0=No abnormality   Total Score: 1     Time NIHSS was completed: 1100    Modified Neo Score:  0 (No baseline symptoms/disability)    Lab Results:  Recent Results (from the past 24 hour(s))   Fingerstick Glucose (POCT)    Collection Time: 08/11/22  9:01 AM   Result Value Ref Range    POC Glucose 150 (H) 65 - 140 mg/dl   Basic metabolic panel    Collection Time: 08/11/22 10:47 AM   Result Value Ref Range    Sodium 137 135 - 147 mmol/L    Potassium 3 9 3 5 - 5 3 mmol/L    Chloride 103 96 - 108 mmol/L    CO2 25 21 - 32 mmol/L    ANION GAP 9 4 - 13 mmol/L    BUN 18 5 - 25 mg/dL    Creatinine 0 73 0 60 - 1 30 mg/dL    Glucose 132 65 - 140 mg/dL    Calcium 9 9 8 4 - 10 2 mg/dL    eGFR 89 ml/min/1 73sq m   CBC and Platelet    Collection Time: 08/11/22 10:47 AM   Result Value Ref Range    WBC 5 56 4 31 - 10 16 Thousand/uL    RBC 4 64 3 81 - 5 12 Million/uL    Hemoglobin 14 3 11 5 - 15 4 g/dL    Hematocrit 43 6 34 8 - 46 1 %    MCV 94 82 - 98 fL    MCH 30 8 26 8 - 34 3 pg    MCHC 32 8 31 4 - 37 4 g/dL    RDW 12 8 11 6 - 15 1 %    Platelets 586 774 - 463 Thousands/uL    MPV 12 0 8 9 - 12 7 fL   Protime-INR    Collection Time: 08/11/22 10:47 AM   Result Value Ref Range    Protime 12 6 11 6 - 14 5 seconds    INR 0 93 0 84 - 1 19   APTT    Collection Time: 08/11/22 10:47 AM   Result Value Ref Range    PTT 26 23 - 37 seconds   ECG 12 lead    Collection Time: 08/11/22 11:02 AM   Result Value Ref Range    Ventricular Rate 58 BPM    Atrial Rate 61 BPM    NY Interval 182 ms    QRSD Interval 72 ms    QT Interval 420 ms    QTC Interval 413 ms    P Bailey 51 degrees    QRS Axis 35 degrees    T Wave Axis 44 degrees Fingerstick Glucose (POCT)    Collection Time: 08/11/22 12:10 PM   Result Value Ref Range    POC Glucose 123 65 - 140 mg/dl       Imaging Studies: I have personally reviewed pertinent reports  and I have personally reviewed pertinent films in PACS  CTH- NO acute intracranial abnormality  CTA head and neck with and without contrast- no significant stenosis of the cervical carotid or vertebral arteries  No high-grade intracranial stenosis, large vessel occlusion or aneurysm  6 mm left upper lobe nodule in retrospect is partially seen on 3/10/2021 and probably unchanged, but consider follow-up chest CT to confirm stability  Incidental thyroid nodule(s) for which non-emergent thyroid ultrasound is recommended

## 2022-08-11 NOTE — ED NOTES
Report given to [facility] JOVITA Luz via tiger text     Fransisco Vital RN  08/11/22 1370

## 2022-08-11 NOTE — ED PROCEDURE NOTE
PROCEDURE  ECG 12 Lead Documentation Only    Date/Time: 8/11/2022 11:13 AM  Performed by: Bette Hernandez MD  Authorized by: Bette Hernandez MD     Indications / Diagnosis:  Left sided facial; numbness   ECG reviewed by me, the ED Provider: yes    Patient location:  ED  Previous ECG:     Previous ECG:  Compared to current    Comparison ECG info:  7/16/22    Similarity:  No change    Comparison to cardiac monitor: Yes    Interpretation:     Interpretation: non-specific    Rate:     ECG rate:  58    ECG rate assessment: bradycardic    Rhythm:     Rhythm: sinus bradycardia    Ectopy:     Ectopy: none    QRS:     QRS axis:  Normal    QRS intervals:  Normal  Conduction:     Conduction: normal    ST segments:     ST segments:  Normal  T waves:     T waves: flattening      Flattening:  V1  Q waves:     Q waves:  V1  Other findings:     Other findings: U wave    Comments:      No ecg signs of ischemia/ injury          Bette Hernandez MD  08/11/22 1123

## 2022-08-11 NOTE — ASSESSMENT & PLAN NOTE
65year old female with DM2, HTN, HLD, obesity who presented to the hospital with complaints of L facial numbness, droop and HA  Stroke alert was activated on arrival and patient underwent CTH/CTA head and neck which demonstrated no acute intracranial abnormality  Patient noted symptoms were improving in ED and no droop was noted on exam  Patient was not an IV thrombolytic candidate due to symptoms resolving and were clearly nondisabling  Suspect symptoms secondary to complicated migraine, but given underlying vascular risk factors, recommend admission for stroke work-up  Plan:   - Recommend admit on stroke pathway  - Check MRI brain without contrast    - Check echocardiogram   - Monitor on telemetry  - Check hemoglobin A1c, fasting lipid panel and TSH    - Give loading dose of  mg x1 and Plavix 300 mg x 1 today  Recommend ASA 81 mg QD and Plavix 75 mg QD starting on August 10, 2022  - Atorvastatin 40 mg QPM    - Allow for permissive hypertension with BP goal <220/110  Treat with PRN antihypertensives  - Goal normothermia and euglycemia    - PT/OT   - Stroke education   - Monitor exam and notify with changes

## 2022-08-11 NOTE — ASSESSMENT & PLAN NOTE
Lab Results   Component Value Date    HGBA1C 7 3 (H) 04/26/2022       Recent Labs     08/11/22  0901 08/11/22  1210   POCGLU 150* 123       Blood Sugar Average: Last 72 hrs:  (P) 136 5     - Goal euglycemia    - Management as per medicine team

## 2022-08-11 NOTE — ASSESSMENT & PLAN NOTE
· Reviewed fasting lipid profile  · Intolerant of high-intensity statin  in the past   · Would continue with Crestor 5 mg daily upon discharge

## 2022-08-11 NOTE — ASSESSMENT & PLAN NOTE
Lab Results   Component Value Date    HGBA1C 7 1 (H) 08/11/2022       Recent Labs     08/11/22  1210 08/11/22  2113 08/12/22  0749 08/12/22  1110   POCGLU 123 108 159* 184*       Blood Sugar Average: Last 72 hrs:  (P) 144 8   · A1c reflects optimal control  · Resume metformin, glipizide, Farxiga at discharge

## 2022-08-11 NOTE — ASSESSMENT & PLAN NOTE
· Recent ER visit approximately 1 month ago for same      · Continue ENT and Rx vestibular therapy  · Continue amitriptyline

## 2022-08-11 NOTE — ED PROVIDER NOTES
History  Chief Complaint   Patient presents with    Headache     Pt was seen for vertigo a couple weeks ago, referred to ENT and PCP  Pt woke up this morning with a "roaring" sound in the L ear and states that her head "feels funny" and has a headache  She also states the L side of her face feels funny, but no other neuro deficits noted in triage or stroke symptoms noted  66 yo female with pmhx of HTN, HLD, type 2 DM, stage 2 CKD, gastric bypass and recent ED visit for vertigo presents with a constant dull occipital headache with associated increased left ear whooshing sound and left sided facial numbness that began this morning  Patient reports waking up with the headache and left side facial numbness with left eye drop that prompted the patient to come to the ED  She has not taken any medication for the pain  Reports taking all her other medications, including her lisinopril  Denies vision changes, nausea, vomiting, dizziness/vertigo, urinary incontinence, wobbling, LOC, falls/trauma or syncope  Denies any recent cold-like symptoms, tick bite or new rashes  Previously seen 07/16 in the ED for vertigo and was treated with meclizine and Zofran and referred to ENT  ENT was working the patient up for vestibular migraine vs Meniere's disease and MRI on 08/01 showed minimal white matter disease likely chronic microangiopathic or chronic migraine  She started 10mg amitriptyline at night for her vestibular migraines and since starting has noted diarrhea  Last episode of vertigo was last Friday and resolved after taking meclizine  Prior to Admission Medications   Prescriptions Last Dose Informant Patient Reported? Taking?    Biotin 1000 MCG CHEW   Yes No   Sig: Chew 6,000 mcg   Calcium 500 MG tablet   Yes No   Sig: Take by mouth   Cholecalciferol 50 MCG (2000 UT) CAPS   Yes No   Sig: Take 1,000 Units by mouth    Continuous Blood Gluc  (FREESTYLE FRANKI 14 DAY READER) SUZY   Yes No   Sig: by Does not apply route   Continuous Blood Gluc Sensor (FreeStyle Ankit 14 Day Sensor) MISC   No No   Sig: USE 1 DEVICE EVERY 14 (FOURTEEN) DAYS   Farxiga 10 MG TABS   No No   Sig: TAKE 1 TABLET BY MOUTH EVERY DAY   Multiple Vitamins-Minerals (MULTIVITAMIN WITH MINERALS) tablet   Yes No   Sig: Take 1 tablet by mouth daily   Oxymetazoline HCl 1 % CREA   Yes No   Sig: Apply topically   Probiotic Product (ALIGN) 4 MG CAPS   Yes No   Sig: Take by mouth   amitriptyline (ELAVIL) 10 mg tablet   No No   Sig: Take 1 tablet (10 mg total) by mouth daily at bedtime   cyclobenzaprine (FLEXERIL) 10 mg tablet   No No   Sig: Take 1 tablet (10 mg total) by mouth daily at bedtime   Patient not taking: No sig reported   glipiZIDE (GLUCOTROL XL) 2 5 mg 24 hr tablet   No No   Sig: TAKE 1 TABLET BY MOUTH EVERY DAY   lansoprazole (PREVACID) 15 mg capsule   Yes No   Sig: Take 15 mg by mouth daily   lisinopril (ZESTRIL) 2 5 mg tablet   Yes No   Sig: Take 2 5 mg by mouth daily   meclizine (ANTIVERT) 25 mg tablet   No No   Sig: Take 1 tablet (25 mg total) by mouth 3 (three) times a day as needed for dizziness   Patient not taking: Reported on 7/25/2022   metFORMIN (GLUCOPHAGE-XR) 500 mg 24 hr tablet   No No   Sig: TAKE 2 TABLETS BY MOUTH TWICE A DAY WITH FOOD   nystatin (MYCOSTATIN) cream   Yes No   Sig: Apply topically 2 (two) times a day   ondansetron (ZOFRAN) 4 mg tablet   No No   Sig: Take 1 tablet (4 mg total) by mouth every 6 (six) hours   rosuvastatin (CRESTOR) 5 mg tablet   Yes No   Sig: Take 5 mg by mouth daily   vitamin B-12 (VITAMIN B-12) 1,000 mcg tablet   Yes No   Sig: Take 10,000 mcg by mouth daily   zolpidem (AMBIEN) 5 mg tablet   Yes No   Sig: Take 2 5 mg by mouth daily at bedtime as needed for sleep      Facility-Administered Medications: None       Past Medical History:   Diagnosis Date    Acid reflux     CKD (chronic kidney disease), stage II     Diabetes mellitus (HCC)     Dizziness     Essential hypertension     Hyperlipidemia     Hypertension     Type 2 diabetes mellitus [facility])        Past Surgical History:   Procedure Laterality Date    ANKLE SURGERY  Nov 2019    ARTHROSCOPIC REPAIR ACL Right     knee    BARIATRIC SURGERY      gastric sleeve    GALLBLADDER SURGERY      SKIN BIOPSY      scalp    TENDON REPAIR Left     ankle    TONSILLECTOMY         Family History   Problem Relation Age of Onset    Hypertension Mother     Diabetes type II Father     Diabetes Father     Diabetes type II Paternal Grandmother     Cancer Paternal Grandmother     Breast cancer Paternal Grandmother 79    Breast cancer Paternal Aunt 76     I have reviewed and agree with the history as documented  E-Cigarette/Vaping    E-Cigarette Use Never User      E-Cigarette/Vaping Substances     Social History     Tobacco Use    Smoking status: Never Smoker    Smokeless tobacco: Never Used   Vaping Use    Vaping Use: Never used   Substance Use Topics    Alcohol use: Yes     Alcohol/week: 5 0 standard drinks     Types: 5 Glasses of wine per week     Comment: occ   Drug use: Never        Review of Systems   Constitutional: Negative for chills and fever  HENT: Negative for congestion, drooling, ear pain, rhinorrhea and sore throat  Eyes: Negative for photophobia, pain and visual disturbance  Respiratory: Negative for cough and shortness of breath  Cardiovascular: Negative for chest pain and palpitations  Gastrointestinal: Positive for diarrhea  Negative for abdominal pain, constipation and vomiting  Genitourinary: Negative for dysuria and hematuria  Musculoskeletal: Negative for arthralgias and back pain  Skin: Negative for color change and rash  Neurological: Positive for numbness and headaches  Negative for dizziness, seizures, syncope and speech difficulty  All other systems reviewed and are negative        Physical Exam  ED Triage Vitals [08/11/22 0844]   Temperature Pulse Respirations Blood Pressure SpO2   98 1 °F (36 7 °C) 71 16 (!) 172/90 97 %      Temp Source Heart Rate Source Patient Position - Orthostatic VS BP Location FiO2 (%)   Oral Monitor Sitting Left arm --      Pain Score       --             Orthostatic Vital Signs  Vitals:    08/11/22 0844   BP: (!) 172/90   Pulse: 71   Patient Position - Orthostatic VS: Sitting       Physical Exam  HENT:      Head: Normocephalic and atraumatic  Right Ear: Tympanic membrane, ear canal and external ear normal  There is no impacted cerumen  Left Ear: Tympanic membrane, ear canal and external ear normal  There is no impacted cerumen  Nose: Nose normal  No congestion or rhinorrhea  Right Sinus: No maxillary sinus tenderness or frontal sinus tenderness  Left Sinus: No maxillary sinus tenderness or frontal sinus tenderness  Comments: Symmetrical sensation intact in the face  Symmetrical Smile  Mouth/Throat:      Mouth: Mucous membranes are moist       Pharynx: No oropharyngeal exudate or posterior oropharyngeal erythema  Eyes:      General: Lids are normal  Vision grossly intact  Gaze aligned appropriately  No visual field deficit  Extraocular Movements: Extraocular movements intact  Right eye: Nystagmus present  Normal extraocular motion  Left eye: Nystagmus present  Normal extraocular motion  Conjunctiva/sclera: Conjunctivae normal       Pupils: Pupils are equal, round, and reactive to light  Comments: Bilateral and symmetrical rotational nystagmus  Peripheral visual fields intact   Musculoskeletal:      Comments: Cervical neck tenderness around C7 with hypertrophic trapezius muscle  Increased Kyphosis  Neurological:      General: No focal deficit present  Mental Status: She is alert  Cranial Nerves: Cranial nerves are intact  No cranial nerve deficit, dysarthria or facial asymmetry  Sensory: Sensation is intact  No sensory deficit  Motor: Motor function is intact   No weakness, tremor or pronator drift  Coordination: Coordination is intact  Romberg sign negative  Coordination normal  Finger-Nose-Finger Test and Heel to Rexann Downy Test normal  Rapid alternating movements normal       Gait: Gait is intact  Gait normal          ED Medications  Medications - No data to display    Diagnostic Studies  Results Reviewed     Procedure Component Value Units Date/Time    Fingerstick Glucose (POCT) [[id number]]  (Abnormal) Collected: 08/11/22 0901    Lab Status: Final result Updated: 08/11/22 0902     POC Glucose 150 mg/dl                  No orders to display         Procedures  Procedures      ED Course                                       MDM  Number of Diagnoses or Management Options  TIA (transient ischemic attack): established and improving  Diagnosis management comments: 66 yo female with pmhx of HTN, HLD, type 2 DM, stage 2 CKD, and recent ED visit for vertigo presents with a constant dull occipital headache with associated increased left ear whooshing sound and left sided facial numbness that has resolved  Physical exam showed intact neuro exam with intact CN, symmetrical smile, symmetrical sensation, symmetrical muscle strength with resolved facial numbness which makes CVA less likely but possible TIA with ABCD2 Score: 5 with moderate risk for a TIA, initiating stroke alert  Physical exam showed  rotational nystagmus bilaterally with left ear may suggest vestibular migraine/complex migraine or Meniere's disease  Cervical spine tenderness with trapezius hypertrophy may suggest cervicalgia headache  DDx include TIA vs cervicalgia headache vs complex migraine vs lyme vs vertigo vs meniere's vs hypertensive headache vs CVA  Blood pressure was rechecked and has come down to 136/61  Patient was reassessed and noted improving occipital headache but now her headache has migrated to her right frontal region  She notes that she has had intermittent aching right frontal headaches since her initial vertigo episode  Pain is tolerable with 4/10  Patient given tylenol for pain management  With an ABCD2 Score: 5 with moderate risk for a TIA, stroke alert was called however her symptoms have resolved and she was not a candidate for thrombolytic IV therapy  CTH/CTA head and neck scans showed No intracranial findings, No significant stenosis of the cervical carotid or vertebral arteries, No high-grade intracranial stenosis, large vessel occlusion or aneurysm, 6 mm left upper lobe nodule in retrospect is partially seen on 3/10/2021 and probably unchanged but consider one-year follow-up chest CT to confirm stability and Incidental thyroid nodule(s) for which nonemergent thyroid ultrasound is recommended  Patient was seen by neurologist who would like to admit for further stroke work up and brain MRI  Patient admitted under Dr Swapna Steward  Disposition  Final diagnoses:   None     ED Disposition     None      Follow-up Information    None         Patient's Medications   Discharge Prescriptions    No medications on file     No discharge procedures on file  PDMP Review       Value Time User    PDMP Reviewed  Yes 3/10/2021  1:38 AM Reva Becerra MD           ED Provider  Attending physically available and evaluated [facility]  I managed the patient along with the ED Attending      Electronically Signed by         Parish Glasgow DO  08/11/22 3381

## 2022-08-11 NOTE — ASSESSMENT & PLAN NOTE
Lab Results   Component Value Date    HGBA1C 7 3 (H) 04/26/2022       Recent Labs     08/11/22  0901 08/11/22  1210   POCGLU 150* 123       Blood Sugar Average: Last 72 hrs:  (P) 136 5   · A1c in April of this year 7 3  · Will recheck A1c given stroke pathway  · Would hold metformin, glipizide, Farxiga for now and place on sliding scale coverage as inpatient

## 2022-08-11 NOTE — ASSESSMENT & PLAN NOTE
· Rechecking fasting lipid panel given CVA pathway  · Intolerant of high-intensity statin, atorvastatin 40 mg in the past   · Would continue with Crestor 5 mg daily upon discharge

## 2022-08-11 NOTE — ED ATTENDING ATTESTATION
8/11/2022  IPauline MD, saw and evaluated the patient  I have discussed the patient with the resident/non-physician practitioner and agree with the resident's/non-physician practitioner's findings, Plan of Care, and MDM as documented in the resident's/non-physician practitioner's note, except where noted  All available labs and Radiology studies were reviewed  I was present for key portions of any procedure(s) performed by the resident/non-physician practitioner and I was immediately available to provide assistance  At this point I agree with the current assessment done in the Emergency Department    I have conducted an independent evaluation of this patient a history and physical is as follows:see h and p above- agree with resident care/ tx plan/ dispo    ED Course  ED Course as of 08/11/22 1756   Thu Aug 11, 2022   1004 Er md note-  8/1/22 brain mri reviewed by er md   56 Er md note- cva alert called by er md -- pt with of left sided facial numbness- left lower eyelid droop- all resolved--  abcd2 score of 5    1111 - er md note- case d/w- neurology on call- all symptoms have resolved-- they will cancel acute stroke alert and see pt          Critical Care Time  Procedures

## 2022-08-12 VITALS
DIASTOLIC BLOOD PRESSURE: 80 MMHG | HEART RATE: 72 BPM | WEIGHT: 263.89 LBS | OXYGEN SATURATION: 94 % | RESPIRATION RATE: 18 BRPM | SYSTOLIC BLOOD PRESSURE: 139 MMHG | BODY MASS INDEX: 43.97 KG/M2 | HEIGHT: 65 IN | TEMPERATURE: 98.3 F

## 2022-08-12 PROBLEM — E66.01 CLASS 3 SEVERE OBESITY IN ADULT (HCC): Status: ACTIVE | Noted: 2022-08-12

## 2022-08-12 PROBLEM — E66.813 CLASS 3 SEVERE OBESITY IN ADULT (HCC): Status: ACTIVE | Noted: 2022-08-12

## 2022-08-12 LAB
AORTIC ROOT: 2.7 CM
APICAL FOUR CHAMBER EJECTION FRACTION: 62 %
ASCENDING AORTA: 3.1 CM
ATRIAL RATE: 61 BPM
CHOLEST SERPL-MCNC: 156 MG/DL
E WAVE DECELERATION TIME: 230 MS
EST. AVERAGE GLUCOSE BLD GHB EST-MCNC: 157 MG/DL
FRACTIONAL SHORTENING: 38 % (ref 28–44)
GLUCOSE SERPL-MCNC: 159 MG/DL (ref 65–140)
GLUCOSE SERPL-MCNC: 184 MG/DL (ref 65–140)
HBA1C MFR BLD: 7.1 %
HDLC SERPL-MCNC: 65 MG/DL
INTERVENTRICULAR SEPTUM IN DIASTOLE (PARASTERNAL SHORT AXIS VIEW): 1.2 CM
INTERVENTRICULAR SEPTUM: 1.2 CM (ref 0.6–1.1)
LAAS-AP4: 17.7 CM2
LDLC SERPL CALC-MCNC: 66 MG/DL (ref 0–100)
LEFT ATRIUM SIZE: 3.7 CM
LEFT INTERNAL DIMENSION IN SYSTOLE: 2.6 CM (ref 2.1–4)
LEFT VENTRICLE DIASTOLIC VOLUME (MOD BIPLANE): 53 ML
LEFT VENTRICLE SYSTOLIC VOLUME (MOD BIPLANE): 22 ML
LEFT VENTRICULAR INTERNAL DIMENSION IN DIASTOLE: 4.2 CM (ref 3.5–6)
LEFT VENTRICULAR POSTERIOR WALL IN END DIASTOLE: 0.9 CM
LEFT VENTRICULAR STROKE VOLUME: 55 ML
LV EF: 58 %
LVSV (TEICH): 55 ML
MV E'TISSUE VEL-SEP: 9 CM/S
MV PEAK A VEL: 0.66 M/S
MV PEAK E VEL: 102 CM/S
MV STENOSIS PRESSURE HALF TIME: 67 MS
MV VALVE AREA P 1/2 METHOD: 3.28 CM2
P AXIS: 51 DEGREES
PR INTERVAL: 182 MS
QRS AXIS: 35 DEGREES
QRSD INTERVAL: 72 MS
QT INTERVAL: 420 MS
QTC INTERVAL: 413 MS
RIGHT ATRIUM AREA SYSTOLE A4C: 7.6 CM2
RIGHT VENTRICLE ID DIMENSION: 2.8 CM
SL CV LV EF: 65
SL CV PED ECHO LEFT VENTRICLE DIASTOLIC VOLUME (MOD BIPLANE) 2D: 80 ML
SL CV PED ECHO LEFT VENTRICLE SYSTOLIC VOLUME (MOD BIPLANE) 2D: 25 ML
T WAVE AXIS: 44 DEGREES
TR MAX PG: 21 MMHG
TR PEAK VELOCITY: 2.3 M/S
TRICUSPID VALVE PEAK REGURGITATION VELOCITY: 2.3 M/S
TRIGL SERPL-MCNC: 126 MG/DL
VENTRICULAR RATE: 58 BPM

## 2022-08-12 PROCEDURE — 99217 PR OBSERVATION CARE DISCHARGE MANAGEMENT: CPT | Performed by: PHYSICIAN ASSISTANT

## 2022-08-12 PROCEDURE — 82948 REAGENT STRIP/BLOOD GLUCOSE: CPT

## 2022-08-12 PROCEDURE — 93010 ELECTROCARDIOGRAM REPORT: CPT | Performed by: INTERNAL MEDICINE

## 2022-08-12 RX ADMIN — ASPIRIN 81 MG CHEWABLE TABLET 81 MG: 81 TABLET CHEWABLE at 08:31

## 2022-08-12 RX ADMIN — INSULIN LISPRO 2 UNITS: 100 INJECTION, SOLUTION INTRAVENOUS; SUBCUTANEOUS at 08:38

## 2022-08-12 RX ADMIN — PANTOPRAZOLE SODIUM 20 MG: 20 TABLET, DELAYED RELEASE ORAL at 05:13

## 2022-08-12 RX ADMIN — ENOXAPARIN SODIUM 40 MG: 40 INJECTION SUBCUTANEOUS at 08:31

## 2022-08-12 NOTE — UTILIZATION REVIEW
Initial Clinical Review    Admission: Date/Time/Statement:   Admission Orders (From admission, onward)     Ordered        08/11/22 1125  Place in Observation  Once                      Orders Placed This Encounter   Procedures    Place in Observation     Standing Status:   Standing     Number of Occurrences:   1     Order Specific Question:   Level of Care     Answer:   Med Surg [16]     ED Arrival Information     Expected   -    Arrival   8/11/2022 08:38    Acuity   Urgent            Means of arrival   Walk-In    Escorted by   Spouse    Service   Hospitalist    Admission type   Urgent            Arrival complaint   left ear problem           Chief Complaint   Patient presents with    Headache     Pt was seen for vertigo a couple weeks ago, referred to ENT and PCP  Pt woke up this morning with a "roaring" sound in the L ear and states that her head "feels funny" and has a headache  She also states the L side of her face feels funny, but no other neuro deficits noted in triage or stroke symptoms noted  Initial Presentation: 65 y o  female w/ PMH of  hypertension, hyperlipidemia, vertigo, left-sided tinnitus, type 2 diabetes who presents with sudden onset of left sided facial numbness and tingling with left-sided tinnitus and vertigo  Additionally complains of a headache  Reported this started all decided this morning upon waking  Had an ER visit 07/16 for left-sided vertigo and tinnitus  She was referred to ENT and audiology in the outpatient setting  She has been on amitriptyline which did improve her symptoms up until this morning  At that time she did not have any numbness or tingling of her face  However upon waking this morning she had similar symptoms to 1 month ago with the addition of left-sided facial numbness and tingling       Admit as observation level of care for  Numbness and tingling of left side of face  · CT:  No acute abnormalities  · CTA:  No stenosis, large vessel occlusion or aneurysm noted  · Discussed with neurology, they will see in consultation  They are recommending starting Stroke Pathway  ? Frequent vital signs  ? Neuro checks  ? Telemetry  ? Check lipid panel and A1c   ? Start aspirin  Continue statin  She is intolerant of high-intensity statins in the past   · Obtain MRI brain  · Check TTE  · This may be vestibular migraine  · Admit for observation  Tinnitus of left ear  · Continue ENT and vestibular therapy  · Continue amitriptyline          ED Triage Vitals   Temperature Pulse Respirations Blood Pressure SpO2   08/11/22 0844 08/11/22 0844 08/11/22 0844 08/11/22 0844 08/11/22 0844   98 1 °F (36 7 °C) 71 16 (!) 172/90 97 %      Temp Source Heart Rate Source Patient Position - Orthostatic VS BP Location FiO2 (%)   08/11/22 0844 08/11/22 0844 08/11/22 0844 08/11/22 0844 --   Oral Monitor Sitting Left arm       Pain Score       08/11/22 1014       4          Wt Readings from Last 1 Encounters:   08/11/22 120 kg (263 lb 14 3 oz)     Additional Vital Signs:   Pertinent Labs/Diagnostic Test Results:   MRI brain wo contrast   Final Result by Venancio Palacios MD (08/11 1728)      No acute pathology  Minimal chronic microangiopathy  Workstation performed: [id number]         CTA stroke alert (head/neck)   Final Result by Venancio Palacios MD (08/11 1116)      No significant stenosis of the cervical carotid or vertebral arteries   No high-grade intracranial stenosis, large vessel occlusion or aneurysm  6 mm left upper lobe nodule in retrospect is partially seen on 3/10/2021 and probably unchanged but consider one-year follow-up chest CT to confirm stability  Incidental thyroid nodule(s) for which nonemergent thyroid ultrasound is recommended  Findings were directly discussed with [employer] at 10:55 AM       Study marked in Epic for notification and follow-up of incidental findings                    Workstation performed: [id number]         CT stroke alert brain   Final Result by Alexei Logan MD (08/11 1102)      No acute intracranial abnormality            Findings were directly discussed with Divya Jara at 10:55 AM             Workstation performed: [id number]               Results from last 7 days   Lab Units 08/11/22  1047   WBC Thousand/uL 5 56   HEMOGLOBIN g/dL 14 3   HEMATOCRIT % 43 6   PLATELETS Thousands/uL 199         Results from last 7 days   Lab Units 08/11/22  1047   SODIUM mmol/L 137   POTASSIUM mmol/L 3 9   CHLORIDE mmol/L 103   CO2 mmol/L 25   ANION GAP mmol/L 9   BUN mg/dL 18   CREATININE mg/dL 0 73   EGFR ml/min/1 73sq m 89   CALCIUM mg/dL 9 9         Results from last 7 days   Lab Units 08/12/22  0749 08/11/22  2113 08/11/22  1210 08/11/22  0901   POC GLUCOSE mg/dl 159* 108 123 150*     Results from last 7 days   Lab Units 08/11/22  1047   GLUCOSE RANDOM mg/dL 132         Results from last 7 days   Lab Units 08/11/22  1047   HEMOGLOBIN A1C % 7 1*   EAG mg/dl 157     No results found for: BETA-HYDROXYBUTYRATE                           Results from last 7 days   Lab Units 08/11/22  1047   PROTIME seconds 12 6   INR  0 93   PTT seconds 26                                                                                                   ED Treatment:   Medication Administration from 08/11/2022 0837 to 08/11/2022 1859       Date/Time Order Dose Route Action Action by Comments     08/11/2022 1014 acetaminophen (TYLENOL) tablet 650 mg 650 mg Oral Given Thomas Carranza RN      08/11/2022 1033 lactated ringers bolus 500 mL 500 mL Intravenous Roxannacaitlyn  Thomas Carranza [address] [place] [address]      08/11/2022 1050 iohexol (OMNIPAQUE) 350 MG/ML injection (MULTI-DOSE) 70 mL 70 mL Intravenous Given Sunshine Amanda      08/11/2022 1207 aspirin chewable tablet 324 mg 324 mg Oral Given Thomas Carranza RN      08/11/2022 1549 insulin lispro (HumaLOG) 100 units/mL subcutaneous injection 2-12 Units 2 Units Subcutaneous Not Given Thomas Carranza RN patient refusing any insulin     08/11/2022 1212 insulin lispro (HumaLOG) 100 units/mL subcutaneous injection 2-12 Units 0 Units Subcutaneous Not Given Mychal Gil RN bs 123     08/11/2022 1540 perflutren lipid microsphere (DEFINITY) injection 3 mL/min Intravenous Given Gigi Brown         Past Medical History:   Diagnosis Date    Acid reflux     CKD (chronic kidney disease), stage II     Diabetes mellitus ([facility] )     Dizziness     Essential hypertension     Hyperlipidemia     Hypertension     Type 2 diabetes mellitus ([facility] )      Present on Admission:   Type 2 diabetes mellitus with hyperglycemia, without long-term current use of insulin (HCC)   Tinnitus of left ear   Numbness and tingling of left side of face   Essential hypertension   Mixed hyperlipidemia      Admitting Diagnosis: TIA (transient ischemic attack) [G45 9]  Ear problem [H93 90]  Age/Sex: 65 y o  female  Admission Orders:  Scheduled Medications:  amitriptyline, 10 mg, Oral, HS  aspirin, 81 mg, Oral, Daily  enoxaparin, 40 mg, Subcutaneous, BID  insulin lispro, 2-12 Units, Subcutaneous, TID AC  insulin lispro, 2-12 Units, Subcutaneous, HS  pantoprazole, 20 mg, Oral, Early Morning  pravastatin, 40 mg, Oral, Daily With Dinner      Continuous IV Infusions:     PRN Meds:  meclizine, 25 mg, Oral, TID PRN  zolpidem, 2 5 mg, Oral, HS PRN        IP CONSULT TO NEUROLOGY    Network Utilization Review Department  ATTENTION: Please call with any questions or concerns to [phone] and carefully listen to the prompts so that you are directed to the right person  All voicemails are confidential   Farrel Bodily all requests for admission clinical reviews, approved or denied determinations and any other requests to dedicated fax number below belonging to the campus where the patient is receiving treatment   List of dedicated fax numbers for the Facilities:  FACILITY NAME UR FAX NUMBER   ADMISSION DENIALS (Administrative/Medical Necessity) [phone]   [address] (Maternity/NICU/Pediatrics) [facility],[address]  [phone]   [facility] [address] [place] [address] [id number] [place] [address] Faina Jones [address] [facility] [place] [address] [phone]

## 2022-08-12 NOTE — INCIDENTAL FINDINGS
The following findings require follow up:  Radiographic finding   Finding:  Lung nodule and thyroid nodule   Follow up required:  Talk to your family doctor about repeat imaging

## 2022-08-12 NOTE — ASSESSMENT & PLAN NOTE
· BMI 43 91  · Given reports of intermittent headaches I have suggested that the patient get re-evaluated for sleep apnea and placed a referral to Sleep Medicine

## 2022-08-12 NOTE — PLAN OF CARE
Problem: CARDIOVASCULAR - ADULT  Goal: Maintains optimal cardiac output and hemodynamic stability  Description: INTERVENTIONS:  - Monitor I/O, vital signs and rhythm  - Monitor for S/S and trends of decreased cardiac output  - Administer and titrate ordered vasoactive medications to optimize hemodynamic stability  - Assess quality of pulses, skin color and temperature  - Assess for signs of decreased coronary artery perfusion  - Instruct patient to report change in severity of symptoms  8/12/2022 1103 by Omkar Loya RN  Outcome: Adequate for Discharge  8/12/2022 0750 by Omkar Loya RN  Outcome: Progressing  Goal: Absence of cardiac dysrhythmias or at baseline rhythm  Description: INTERVENTIONS:  - Continuous cardiac monitoring, vital signs, obtain 12 lead EKG if ordered  - Administer antiarrhythmic and heart rate control medications as ordered  - Monitor electrolytes and administer replacement therapy as ordered  8/12/2022 1103 by Omkar Loya RN  Outcome: Adequate for Discharge  8/12/2022 0750 by mOkar Loya RN  Outcome: Progressing

## 2022-08-12 NOTE — DISCHARGE SUMMARY
[facility]  Discharge- Mandi Macdonald 1960, 65 y o  female MRN: [id number]  Unit/Bed#: S -01 Encounter: [id number]  Primary Care Provider: Paz Reid MD   Date and time admitted to hospital: 8/11/2022  8:51 AM    * Numbness and tingling of left side of face  Assessment & Plan   Patient presented with sudden onset of left-sided facial numbness and tingling as well as vertigo and left-sided tinnitus  Presented to the ER approximately 1 month ago with left-sided tinnitus and vertigo but no numbness or tingling of her face  She was referred to ENT at that time  Started on amitriptyline with improvement of her symptoms up until this admission   CT:  No acute abnormalities   CTA:  No stenosis, large vessel occlusion or aneurysm noted   Appreciate Neuro consultation   MRI brain unremarkable   2D echocardiogram also unremarkable      Class 3 severe obesity in adult [facility])  Assessment & Plan  · BMI 43 91  · Given reports of intermittent headaches I have suggested that the patient get re-evaluated for sleep apnea and placed a referral to Sleep Medicine    Tinnitus of left ear  Assessment & Plan  · Recent ER visit approximately 1 month ago for same  · Continue ENT and Rx vestibular therapy  · Continue amitriptyline    Essential hypertension  Assessment & Plan  · Blood pressure noted to be normal   Can continue home meds    Vertigo  Assessment & Plan  · P r n   Meclizine  · Vestibular PT    Type 2 diabetes mellitus with hyperglycemia, without long-term current use of insulin [facility])  Assessment & Plan  Lab Results   Component Value Date    HGBA1C 7 1 (H) 08/11/2022       Recent Labs     08/11/22  1210 08/11/22  2113 08/12/22  0749 08/12/22  1110   POCGLU 123 108 159* 184*       Blood Sugar Average: Last 72 hrs:  (P) 144 8   · A1c reflects optimal control  · Resume metformin, glipizide, Farxiga at discharge    Mixed hyperlipidemia  Assessment & Plan  · Reviewed fasting lipid profile  · Intolerant of high-intensity statin  in the past   · Would continue with Crestor 5 mg daily upon discharge    Medical Problems             Resolved Problems  Date Reviewed: 8/12/2022   None               Discharging Physician / Practitioner: Eliza Hannah PA-C  PCP: Angel Luis Potter MD  Admission Date:   Admission Orders (From admission, onward)     Ordered        08/11/22 1125  Place in Observation  Once                      Discharge Date: 08/12/22    Consultations During Hospital Stay:  · Neurology    Procedures Performed:   · Head CT  · CTA head and neck  · 2D echocardiogram  · MRI of the brain    Significant Findings / Test Results:   · As above    Incidental Findings:   · Thyroid nodule  · Lung nodule     Test Results Pending at Discharge (will require follow up): · None     Outpatient Tests Requested:  · Sleep study    Complications:  None    Reason for Admission:  [address] Course:   Darian Pineda is a 65 y o  female patient who originally presented to the hospital on 8/11/2022 due to left facial paresthesias, left-sided tinnitus and dizziness  Patient also reports mild headache  She has underlying previous history of tinnitus and vertigo but had been improving until this admission  She was admitted for stroke workup and seen by Neurology  CTA of the head and neck, MRI of the brain and 2D echocardiogram were unremarkable  She is feeling improved overnight and is stable to discharge with outpatient vestibular PT and p r n  Meclizine  Please see above list of diagnoses and related plan for additional information  Condition at Discharge: stable    Discharge Day Visit / Exam:   Subjective:  Patient reports she had a slight headache this morning but overall is feeling better    Reports that she slept very well last night and is not having any significant sense of vertigo  Vitals: Blood Pressure: 139/80 (08/12/22 0745)  Pulse: 72 (08/12/22 0745)  Temperature: 98 3 °F (36 8 °C) (08/12/22 0745)  Temp Source: Oral (08/12/22 0000)  Respirations: 18 (08/12/22 0745)  Height: 5' 5" (165 1 cm) (08/11/22 1900)  Weight - Scale: 120 kg (263 lb 14 3 oz) (08/11/22 1900)  SpO2: 94 % (08/12/22 0745)  Exam:   Physical Exam  Vitals reviewed  Constitutional:       General: She is not in acute distress  Appearance: Normal appearance  She is obese  She is not ill-appearing, toxic-appearing or diaphoretic  Comments: Well-appearing female seen sitting up in bedside chair in no distress   HENT:      Head: Normocephalic and atraumatic  Eyes:      General: No scleral icterus  Right eye: No discharge  Left eye: No discharge  Conjunctiva/sclera: Conjunctivae normal    Cardiovascular:      Rate and Rhythm: Normal rate and regular rhythm  Heart sounds: No murmur heard  Pulmonary:      Effort: No respiratory distress  Breath sounds: No stridor  No wheezing, rhonchi or rales  Abdominal:      General: There is no distension  Tenderness: There is no guarding  Musculoskeletal:      Right lower leg: No edema  Left lower leg: No edema  Skin:     General: Skin is warm and dry  Coloration: Skin is not jaundiced or pale  Findings: No bruising, erythema, lesion or rash  Neurological:      Mental Status: She is alert  Mental status is at baseline  Comments: Awake alert interactive no dysarthria  No aphasia  No facial asymmetry  Psychiatric:         Mood and Affect: Mood normal          Thought Content: Thought content normal           Discussion with Family: Updated  () at bedside  Discharge instructions/Information to patient and family:   See after visit summary for information provided to patient and family  Provisions for Follow-Up Care:  See after visit summary for information related to follow-up care and any pertinent home health orders         Disposition:   Home    Planned Readmission: none     Discharge Statement:  I spent 35 minutes discharging the patient  This time was spent on the day of discharge  I had direct contact with the patient on the day of discharge  Greater than 50% of the total time was spent examining patient, answering all patient questions, arranging and discussing plan of care with patient as well as directly providing post-discharge instructions  Additional time then spent on discharge activities  Case discussed with case management and nursing    Discharge Medications:  See after visit summary for reconciled discharge medications provided to patient and/or family        **Please Note: This note may have been constructed using a voice recognition system**

## 2022-08-12 NOTE — DISCHARGE INSTR - AVS FIRST PAGE
Dear Heidy Nicholson,     It was our pleasure to care for you here at [facility]  It is our hope that we were always able to exceed the expected standards for your care during your stay  You were hospitalized due to facial tingling  You were cared for on the 3rd floor by Eileen Shirley PA-C under the service of Clayton Rodrigez MD with the [facility] who covers for your primary care physician (PCP), Arley Rodriguez MD, while you were hospitalized  If you have any questions or concerns related to this hospitalization, you may contact us at [phone]  For follow up as well as any medication refills, we recommend that you follow up with your primary care physician  A registered nurse will reach out to you by phone within a few days after your discharge to answer any additional questions that you may have after going home  However, at this time we provide for you here, the most important instructions / recommendations at discharge:     Notable Medication Adjustments -   No changes  Testing Required after Discharge -   Follow up on incidental thyroid and lung nodule  Important follow up information -   Neurology  Family doctor  Other Instructions -   Outpatient sleep apnea testing  Vestibular PT for dizziness  Please review this entire after visit summary as additional general instructions including medication list, appointments, activity, diet, any pertinent wound care, and other additional recommendations from your care team that may be provided for you        Sincerely,     Eileen Shirley PA-C

## 2022-08-18 ENCOUNTER — TELEPHONE (OUTPATIENT)
Dept: NEUROLOGY | Facility: CLINIC | Age: 62
End: 2022-08-18

## 2022-08-18 ENCOUNTER — HOSPITAL ENCOUNTER (OUTPATIENT)
Dept: ULTRASOUND IMAGING | Facility: HOSPITAL | Age: 62
Discharge: HOME | End: 2022-08-18
Payer: COMMERCIAL

## 2022-08-18 DIAGNOSIS — E04.1 THYROID NODULE INCIDENTALLY NOTED ON IMAGING STUDY: ICD-10-CM

## 2022-08-18 PROCEDURE — 76536 US EXAM OF HEAD AND NECK: CPT

## 2022-08-18 NOTE — TELEPHONE ENCOUNTER
SLAN/NUMBNESS & TINGLING LEFT SIDE OF FACE, TINNITUS OF LEFT EAR          NOTES: TO BE DETERMINED  SENT LUIZ CHINO A STAFF MESSAGE:        [facility],     Can you please provide HFU information? Thank you kindly!        Adenike Ventura        AWAITING HER RESPONSE

## 2022-08-19 NOTE — TELEPHONE ENCOUNTER
YOBANY Anderson March  Patient can followup with headache AP or attending in 8-10 weeks  Will schedule accordingly and add patient to the wait list  I will send patient a letter w/appointment details/directions

## 2022-08-23 ENCOUNTER — EVALUATION (OUTPATIENT)
Dept: PHYSICAL THERAPY | Age: 62
End: 2022-08-23
Payer: COMMERCIAL

## 2022-08-23 VITALS — HEART RATE: 70 BPM | SYSTOLIC BLOOD PRESSURE: 118 MMHG | DIASTOLIC BLOOD PRESSURE: 78 MMHG

## 2022-08-23 DIAGNOSIS — H93.12 TINNITUS OF LEFT EAR: ICD-10-CM

## 2022-08-23 DIAGNOSIS — R26.89 IMBALANCE: ICD-10-CM

## 2022-08-23 DIAGNOSIS — R42 VERTIGO: ICD-10-CM

## 2022-08-23 DIAGNOSIS — R26.9 ABNORMALITY OF GAIT: Primary | ICD-10-CM

## 2022-08-23 DIAGNOSIS — Z86.69 HISTORY OF MIGRAINE: ICD-10-CM

## 2022-08-23 PROCEDURE — 97162 PT EVAL MOD COMPLEX 30 MIN: CPT

## 2022-08-23 PROCEDURE — 97110 THERAPEUTIC EXERCISES: CPT

## 2022-08-24 ENCOUNTER — OFFICE VISIT (OUTPATIENT)
Dept: PHYSICAL THERAPY | Age: 62
End: 2022-08-24
Payer: COMMERCIAL

## 2022-08-24 DIAGNOSIS — Z86.69 HISTORY OF MIGRAINE: ICD-10-CM

## 2022-08-24 DIAGNOSIS — R26.89 IMBALANCE: ICD-10-CM

## 2022-08-24 DIAGNOSIS — R42 VERTIGO: Primary | ICD-10-CM

## 2022-08-24 PROCEDURE — 97110 THERAPEUTIC EXERCISES: CPT

## 2022-08-24 PROCEDURE — 97140 MANUAL THERAPY 1/> REGIONS: CPT

## 2022-08-24 NOTE — PROGRESS NOTES
Daily Note     Today's date: 2022  Patient name: Filipe Burrows  : 1960  MRN: [id number]  Referring provider: Sydnee Dempsey PA-C  Dx:   Encounter Diagnosis     ICD-10-CM    1  Vertigo  R42    2  Imbalance  R26 89    3  History of migraine  Z86 69                   Subjective: "My amytryptilline has been increased to 20 mg  "      Objective: See treatment diary below      Assessment: Tolerated treatment well  Patient would benefit from continued PT      Plan: Continue per plan of care  Precautions:      Allergies  Reviewed by Tomasa Hurtado at 10:56 PM   Severity Reactions Comments   Ampicillin Not Specified Hives    Darvon [propoxyphene] Not Specified Vomiting    Lipitor [atorvastatin] Not Specified Arthralgia    Vioxx [rofecoxib] Not Specified       PMH: DM type 2, essential HTN, vertigo, tinnitus left ear, numbness left side of face, CKD stage 2, acid reflux, 2019 ankle surgery, right arthroscopic ACL repair right le, bariatric gastric sleeve surgery, gall bladder surgery, tendon repair, tonsillectomy      Manuals 22           graston c spine  I eval  man           Suboccipital release man  man man           Myofascial release c frontalis, temporalis, manual neck traction  man man                        Neuro Re-Ed/ there exer             Tandem gait 1 min  1 min x 1           Tandem stance 30 sec x 3 30 sec x 3           Chin tucks  5 reps 5 sec hold  5 reps            Upper trap stretch, levator stretch, ant scalene stretch  3 reps hold 10 sec           scm stretch  3 reps 10 sec hold           Cervical isometrics  5 reps 5sec hold            Ankle sway referencing flat and foam eyes open , eyes closed   10 reps each           Ther Ex             Squats look thru blinds  3 sets of 10                                                                                                       Ther Activity                                       Gait Training Modalities              prn to c spine   No charge

## 2022-08-24 NOTE — PROGRESS NOTES
PT Evaluation     Today's date: 2022  Patient name: Bret Vang  : 1960  MRN: [id number]  Referring provider: Suyapa Barros PA-C  Dx:   Encounter Diagnosis     ICD-10-CM    1  Abnormality of gait  R26 9    2  Tinnitus of left ear  H93 12 Ambulatory referral to Physical Therapy   3  History of migraine  Z86 69 Ambulatory referral to Physical Therapy   4  Imbalance  R26 89 Ambulatory referral to Physical Therapy   5  Vertigo  R42                   Assessment  Assessment details: Bret Vang is a 65year old female referred to outpt PT with a diagnosis of vertigo, imbalance, unstable gait , hx of migraine headaches and tinnitus in the left ear with onset of vertigo and imbalance symptoms noted mid 2022 when she woke up with vertigo, imbalance and unstable gait progressing to the need to go to the ER that day per her report  The pt presents with cervical pain,  decreased central ( decreased smooth pursuit ability) and peripheral processing deficits, significant myofascial restriction in the cervical and upper thoracic spine, decreased cervical rom, and possible vertiginous migraine episodes versus Meniere's condition  PT will monitor the pt's complaint of constant left ear fullness sensation  Upon cueing she is able to stabilize her gaze with smooth pursuit testing however she presents with quick right/left eye movements when making eye contact and when not cueing to hold her gaze upon testing  ( it is unclear if this is due to her job of looking at 2 computer screens throughout much of the day)  PT is warranted to address the above stated deficits in efforts to reduce episodes of vertigo, improve balance and return to all prior activity without fear of loss of balance, onset of vertigo or nausea     Impairments: abnormal gait, abnormal or restricted ROM, activity intolerance, impaired balance, impaired physical strength, lacks appropriate home exercise program, pain with function, poor posture and poor body mechanics  Other impairment: hx of multisite headaches, increased stress with current work duties, left ear fullness, vestibular dysfunction  Functional limitations: decreased balance, unstable gait, imbalance, myofascial restriction , decreased gaze stabilization ( central processing)  Symptom irritability: moderateUnderstanding of Dx/Px/POC: good   Prognosis: good    Goals  Short Term  1  The pt shall achieve vertigo reduction by 50 percent in two weeks  2  The pt shall achieve independent bed mobility in two weeks  3  The pt shall prove independent in a home exer program in two weeks  Long Term Goals Vestibular  1  The pt shall achieve 80 percent reduction in vertigo in 4 weeks   2  The pt shall achieve quick turns with gait with not loss of balance in 4 weeks    Plan  Patient would benefit from: PT eval and skilled physical therapy  Referral necessary: Yes  Other planned modality interventions: as needed  Planned therapy interventions: manual therapy, massage, balance, patient education, postural training, neuromuscular re-education, strengthening, stretching, therapeutic activities, therapeutic exercise, gait training and home exercise program  Other planned therapy interventions: vestibular rehabilitation, habituation exer, eye head exer  Frequency: 2x week  Duration in weeks: 8  Plan of Care beginning date: 8/23/2022  Plan of Care expiration date: 10/21/2022  Treatment plan discussed with: patient and family        Subjective Evaluation    History of Present Illness  Onset date: mid july 2022 onset of vertigo and imbalance upon awakening   Mechanism of injury: Rao Hood is a 65year old female referred to outpt PT with c/o vertigo, migraine headaches, imbalance and tinnitus of the left ear in addition to c/o left ear fullness with report of waking up in mid July of 2022 with onset of vertigo, imbalance and unstable gait   She attempted to get up and make coffee f/b onset of extreme dizziness and being unable to ambulate with the pt taken to the ER by her   She was given a CT scan, meclazine and zofran medication for nausea and sent home that same day from the ER  She reported a second episode of vertigo approx 2 1/2 weeks ago  She did go the hospital on 22 and was kept overnight due to c/o left eye and facial drooping and left facial numbness  On 22 while working and on a phone call she reported the noise in her left ear becoming very loud, she then became very dizzy and unable to ambulate and laid down on her bed to calm her symptoms down  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 , computer and video calls currently working from home with two screens being utilized  She does report increased stress at work  She is considering a medical leave of absence as symptoms are not reducing  PMH right knee pain, right acl reconstruction,  includes a hx of migraine headaches in her 20's that did resolve, she now c/o's multisite headaches and a hx of left ear fullness since  which was intermittently present but is now constant  She reports meclazine does help lessen her symptoms   (See also PMH)          Recurrent probem    Quality of life: good    Pain  Current pain ratin  At best pain ratin  At worst pain ratin  Location: ct junction and suboccipital tightness and lower cervical tightness, headaches temporalis bandlike,  suboccipital , top of head , left sided   Quality: sharp, radiating, dull ache, tight and pulling  Relieving factors: change in position, heat and rest  Aggravating factors: overhead activity, keyboarding and lifting  Progression: no change    Social Support  Steps to enter house: yes (3 steps bilateral rails)  Stairs in house: yes (14 steps right railing)   Lives in: multiple-level home  Lives with: spouse    Employment status: working (full time senior leisure advisor much computer, video calls and telephone work , works from home)  Hand dominance: right  Exercise history: would like to walk more however increased knee pain  Life stress: increased stress at work reported     Treatments  Current treatment: physical therapy  Patient Goals  Patient goals for therapy: decreased pain, increased motion, improved balance, return to sport/leisure activities and increased strength  Patient goal: reduction in vertigo by 50 percent in 4 weeks , no loss of balance with quick turns with gait        Objective     Active Range of Motion   Cervical/Thoracic Spine       Cervical  Subcranial protraction:  WFL   Subcranial retraction: Active cervical subcranial retraction: 1/2 range    Flexion:  WFL  Extension: Neck active extension: 3/4 range       Left lateral flexion: Neck active lateral bend left: 3/4 range      with pain  Right lateral flexion: Neck active lateral bend right: 3/4 range      with pain  Left rotation:  WFL  Right rotation: Neck active rotation right: 3/4 range left sided neck pain     with pain    Strength/Myotome Testing     Additional Strength Details  Cervical mmt 5/5 with exception of axial extension 4/5 mmt    Pt tends to hyperextend head into pillow to roll in pain  Suggest also 2 pillow with sleeping as pt with forward head , + dowager's hump with ct junction rigidity with ap spine testing  Joint mob of thoracic spine upper region eliminated pain with left cervical rotation  Ambulation     Ambulation: Level Surfaces   Ambulation without assistive device: independent    Additional Level Surfaces Ambulation Details  150 ft    Pt currently not driving for fear of onset of vertigo per her report  Hx of cataracts surgery x 2,  5-6 years ago ,     Tandem gait cg of 1 10 ft, tandem stance pt falls in in 20 sec   Sharpened romberg falls in 5 seconds    VOR can neg, VOR 2 complaint of discomfort but able to perform, smooth pursuit with quick right left eye movements with cueing pt is able to stabilize gaze however when focusing on a persons face and following pen pt reverts back to very quick right left eye movements  Saccades intact, convergence 1 inch,  Supine lying neg, right and left rolling neg, left  And right sidelying to sit very slight light headedness, sit to sidelying negative, rich hallpike left sided neck pain , return to upright slight light headedness bilaterally no nystagmus noted  Static stance on flat surface wfl's eyes open , eyes closed slight ant post sway,  Foam standing eyes open slight ant post sway,  Eyes closed on foam increased ant post way,  Foam standing eyes closed falls with vertical head turns,  C/o left ear fullness fluctuates in intensity but constantly present  Flowsheet Rows    Flowsheet Row Most Recent Value   PT/OT G-Codes    Current Score 37   Projected Score 53   Assessment Type Evaluation   G code set Mobility: Walking & Moving Around   Mobility: Walking and Moving Around Current Status () CK   Mobility: Walking and Moving Around Goal Status () CJ             Precautions:      Allergies  Reviewed by Ariadna Vazquez at 10:56 PM   Severity Reactions Comments   Ampicillin Not Specified Hives    Darvon [propoxyphene] Not Specified Vomiting    Lipitor [atorvastatin] Not Specified Arthralgia    Vioxx [rofecoxib] Not Specified       PMH: DM type 2, essential HTN, vertigo, tinnitus left ear, numbness left side of face, CKD stage 2, acid reflux, Nov 2019 ankle surgery, right arthroscopic ACL repair right le, bariatric gastric sleeve surgery, gall bladder surgery, tendon repair, tonsillectomy, nodule in thyroid and lung      Manuals 8/23/22            Family training in future with suboccipital release myofascial release techniques I eval             Suboccipital release man  man            Myofascial release c frontalis, temporalis, manual neck traction  man                         Neuro Re-Ed/ there exer             Tandem gait 1 min             Tandem stance 30 sec x 3            Chin tucks  5 reps 5 sec hold             Upper trap stretch, levator stretch, ant scalene stretch             scm stretch             Cervical isometrics             thera band  Sh ext and rows             Ther Ex                                                                                                                     Ther Activity                                       Gait Training                                       Modalities

## 2022-08-28 DIAGNOSIS — E11.65 UNCONTROLLED TYPE 2 DIABETES MELLITUS WITH HYPERGLYCEMIA (HCC): ICD-10-CM

## 2022-08-28 DIAGNOSIS — E11.65 TYPE 2 DIABETES MELLITUS WITH HYPERGLYCEMIA, WITHOUT LONG-TERM CURRENT USE OF INSULIN (HCC): ICD-10-CM

## 2022-08-29 ENCOUNTER — OFFICE VISIT (OUTPATIENT)
Dept: PHYSICAL THERAPY | Age: 62
End: 2022-08-29
Payer: COMMERCIAL

## 2022-08-29 DIAGNOSIS — R42 VERTIGO: Primary | ICD-10-CM

## 2022-08-29 PROBLEM — E04.1 THYROID NODULE INCIDENTALLY NOTED ON IMAGING STUDY: Status: ACTIVE | Noted: 2022-08-29

## 2022-08-29 PROCEDURE — 97110 THERAPEUTIC EXERCISES: CPT

## 2022-08-29 PROCEDURE — 97140 MANUAL THERAPY 1/> REGIONS: CPT

## 2022-08-29 RX ORDER — GLIPIZIDE 2.5 MG/1
TABLET, EXTENDED RELEASE ORAL
Qty: 90 TABLET | Refills: 1 | Status: SHIPPED | OUTPATIENT
Start: 2022-08-29

## 2022-08-29 RX ORDER — METFORMIN HYDROCHLORIDE 500 MG/1
TABLET, EXTENDED RELEASE ORAL
Qty: 120 TABLET | Refills: 5 | Status: SHIPPED | OUTPATIENT
Start: 2022-08-29 | End: 2022-09-30

## 2022-08-29 NOTE — PROGRESS NOTES
Daily Note     Today's date: 2022  Patient name: Swapna Araiza  : 1960  MRN: [id number]  Referring provider: Rodger Capellan PA-C  Dx:   Encounter Diagnosis     ICD-10-CM    1  Vertigo  R42                   Subjective: Pt  Reports tinnitus has diminished  Objective: See treatment diary below      Assessment: Tolerated treatment well  Patient would benefit from continued PT      Plan: Continue per plan of care  Precautions:      Allergies  Reviewed by Lakesha Yeboah at 10:56 PM   Severity Reactions Comments   Ampicillin Not Specified Hives    Darvon [propoxyphene] Not Specified Vomiting    Lipitor [atorvastatin] Not Specified Arthralgia    Vioxx [rofecoxib] Not Specified       PMH: DM type 2, essential HTN, vertigo, tinnitus left ear, numbness left side of face, CKD stage 2, acid reflux, 2019 ankle surgery, right arthroscopic ACL repair right le, bariatric gastric sleeve surgery, gall bladder surgery, tendon repair, tonsillectomy      Manuals 22          graston c spine  I eval  man 15 min          Suboccipital release man  man man 15 min          Myofascial release c frontalis, temporalis, manual neck traction  man man                        Neuro Re-Ed/ there exer             Tandem gait 1 min  1 min x 1 1 min          Tandem stance 30 sec x 3 30 sec x 3 20sec x 5          Chin tucks  5 reps 5 sec hold  5 reps  2 x 10          Upper trap stretch, levator stretch, ant scalene stretch  3 reps hold 10 sec 20sec x 5          scm stretch  3 reps 10 sec hold 20sec x 5          Cervical isometrics  5 reps 5sec hold  5sec x 5          Ankle sway referencing flat and foam eyes open , eyes closed   10 reps each 10x          Ther Ex             Squats look thru blinds  3 sets of 10  3 x 10                                                                                                     Ther Activity                                       Gait Training Modalities              prn to c spine   No charge

## 2022-08-31 ENCOUNTER — OFFICE VISIT (OUTPATIENT)
Dept: PHYSICAL THERAPY | Age: 62
End: 2022-08-31
Payer: COMMERCIAL

## 2022-08-31 DIAGNOSIS — R42 VERTIGO: ICD-10-CM

## 2022-08-31 DIAGNOSIS — R26.9 ABNORMALITY OF GAIT: Primary | ICD-10-CM

## 2022-08-31 PROCEDURE — 97530 THERAPEUTIC ACTIVITIES: CPT

## 2022-08-31 PROCEDURE — 97110 THERAPEUTIC EXERCISES: CPT

## 2022-08-31 PROCEDURE — 97140 MANUAL THERAPY 1/> REGIONS: CPT

## 2022-08-31 NOTE — PROGRESS NOTES
Daily Note     Today's date: 2022  Patient name: Heidy Nciholson  : 1960  MRN: [id number]  Referring provider: Erica Le PA-C  Dx:   Encounter Diagnosis     ICD-10-CM    1  Abnormality of gait  R26 9    2  Vertigo  R42                   Subjective: "I don't have any vertigo "      Objective: See treatment diary below      Assessment: Tolerated treatment well  Patient would benefit from continued PT      Plan: Continue per plan of care  Pending continued improvement in vertigo increase myofascial release to cervical and thoracic region      Precautions:      Allergies  Reviewed by Crow Ulrich at 10:56 PM   Severity Reactions Comments   Ampicillin Not Specified Hives    Darvon [propoxyphene] Not Specified Vomiting    Lipitor [atorvastatin] Not Specified Arthralgia    Vioxx [rofecoxib] Not Specified       PMH: DM type 2, essential HTN, vertigo, tinnitus left ear, numbness left side of face, CKD stage 2, acid reflux, 2019 ankle surgery, right arthroscopic ACL repair right le, bariatric gastric sleeve surgery, gall bladder surgery, tendon repair, tonsillectomy      Manuals 22         graston c spine  I eval  man 13 min 13 min man         Suboccipital release man  man man 15 min          Myofascial release c frontalis, temporalis, manual neck traction  man man  periscapular trigger point release             Retest rich hallpike neg , rolling neg         Neuro Re-Ed/ there exer             Tandem gait 1 min  1 min x 1 1 min  1min x 1         Tandem stance 30 sec x 3 30 sec x 3 20sec x 5 30 sec x 3         Chin tucks  5 reps 5 sec hold  5 reps  2 x 10 At home         Upper trap stretch, levator stretch, ant scalene stretch  3 reps hold 10 sec 20sec x 5          scm stretch  3 reps 10 sec hold 20sec x 5          Cervical isometrics  5 reps 5sec hold  5sec x 5 5 reps 5 sec         Ankle sway referencing flat and foam eyes open , eyes closed   10 reps each 10x 10 reps          Ther Ex Squats look thru blinds  3 sets of 10  3 x 10 3 x 10         Walk and toss ball to self , walk and toss ball side to side    40 ft x 2         Flex/ ext, ,, rotation ccw and cw circles with ball in standing habituation exer    10 reps each                                                                          Ther Activity                                       Gait Training                                       Modalities              prn to c spine   No charge

## 2022-09-07 ENCOUNTER — APPOINTMENT (OUTPATIENT)
Dept: PHYSICAL THERAPY | Age: 62
End: 2022-09-07
Payer: COMMERCIAL

## 2022-09-08 ENCOUNTER — APPOINTMENT (OUTPATIENT)
Dept: PHYSICAL THERAPY | Age: 62
End: 2022-09-08
Payer: COMMERCIAL

## 2022-09-12 ENCOUNTER — OFFICE VISIT (OUTPATIENT)
Dept: PHYSICAL THERAPY | Age: 62
End: 2022-09-12
Payer: COMMERCIAL

## 2022-09-12 DIAGNOSIS — R26.9 ABNORMALITY OF GAIT: Primary | ICD-10-CM

## 2022-09-12 DIAGNOSIS — R42 VERTIGO: ICD-10-CM

## 2022-09-12 PROCEDURE — 97110 THERAPEUTIC EXERCISES: CPT

## 2022-09-12 PROCEDURE — 97140 MANUAL THERAPY 1/> REGIONS: CPT

## 2022-09-12 NOTE — PROGRESS NOTES
Daily Note     Today's date: 2022  Patient name: Marilyn Barger  : 1960  MRN: [id number]  Referring provider: Bryan Nelson PA-C  Dx:   Encounter Diagnosis     ICD-10-CM    1  Abnormality of gait  R26 9    2  Vertigo  R42                   Subjective: "I was out sick with some kind of virus not covid 19 though "      Objective: See treatment diary below      Assessment: Tolerated treatment well  Patient demonstrated fatigue post treatment  Pt without c/o vertigo  Plan: Continue per plan of care  Precautions:      Allergies  Reviewed by Lizzy Goodman at 10:56 PM   Severity Reactions Comments   Ampicillin Not Specified Hives    Darvon [propoxyphene] Not Specified Vomiting    Lipitor [atorvastatin] Not Specified Arthralgia    Vioxx [rofecoxib] Not Specified       PMH: DM type 2, essential HTN, vertigo, tinnitus left ear, numbness left side of face, CKD stage 2, acid reflux, 2019 ankle surgery, right arthroscopic ACL repair right le, bariatric gastric sleeve surgery, gall bladder surgery, tendon repair, tonsillectomy      Manuals 22        graston c spine  I eval  man 13 min 13 min man 13 min man        Suboccipital release man  man man 13 min  man        Myofascial release c frontalis, temporalis, manual neck traction  man man  periscapular trigger point release Man with trigger point release            Retest rich hallpike neg , rolling neg         Neuro Re-Ed/ there exer             Tandem gait 1 min  1 min x 1 1 min  1min x 1 1 min x 1        Tandem stance 30 sec x 3 30 sec x 3 20sec x 5 30 sec x 3 30 sec x 3        Chin tucks  5 reps 5 sec hold  5 reps  2 x 10 At home 5 reps 5 sec hold         Upper trap stretch, levator stretch, ant scalene stretch  3 reps hold 10 sec 20sec x 5          scm stretch  3 reps 10 sec hold 20sec x 5          Cervical isometrics  5 reps 5sec hold  5sec x 5 5 reps 5 sec 5 reps 5 sec        Ankle sway referencing flat and foam eyes open , eyes closed   10 reps each 10x 10 reps  10 reps each green discs        Ther Ex             Squats look thru blinds  3 sets of 10  3 x 10 3 x 10 3 x 10        Walk and toss ball to self , walk and toss ball side to side    40 ft x 2 40 ft x 2        Flex/ ext, ,, rotation ccw and cw circles with ball in standing habituation exer    10 reps each 10 reps                                                                          Ther Activity                                       Gait Training                                       Modalities              prn to c spine   No charge   No charge

## 2022-09-13 ENCOUNTER — LAB (OUTPATIENT)
Dept: LAB | Facility: CLINIC | Age: 62
End: 2022-09-13
Payer: COMMERCIAL

## 2022-09-13 DIAGNOSIS — E11.65 TYPE 2 DIABETES MELLITUS WITH HYPERGLYCEMIA, WITHOUT LONG-TERM CURRENT USE OF INSULIN (HCC): ICD-10-CM

## 2022-09-13 LAB
ANION GAP SERPL CALCULATED.3IONS-SCNC: 7 MMOL/L (ref 4–13)
BUN SERPL-MCNC: 15 MG/DL (ref 5–25)
CALCIUM SERPL-MCNC: 9.4 MG/DL (ref 8.3–10.1)
CHLORIDE SERPL-SCNC: 100 MMOL/L (ref 96–108)
CHOLEST SERPL-MCNC: 148 MG/DL
CO2 SERPL-SCNC: 26 MMOL/L (ref 21–32)
CREAT SERPL-MCNC: 0.83 MG/DL (ref 0.6–1.3)
CREAT UR-MCNC: 149 MG/DL
EST. AVERAGE GLUCOSE BLD GHB EST-MCNC: 157 MG/DL
GFR SERPL CREATININE-BSD FRML MDRD: 76 ML/MIN/1.73SQ M
GLUCOSE P FAST SERPL-MCNC: 186 MG/DL (ref 65–99)
HBA1C MFR BLD: 7.1 %
HDLC SERPL-MCNC: 53 MG/DL
LDLC SERPL CALC-MCNC: 64 MG/DL (ref 0–100)
MICROALBUMIN UR-MCNC: 70.4 MG/L (ref 0–20)
MICROALBUMIN/CREAT 24H UR: 47 MG/G CREATININE (ref 0–30)
NONHDLC SERPL-MCNC: 95 MG/DL
POTASSIUM SERPL-SCNC: 4.5 MMOL/L (ref 3.5–5.3)
SODIUM SERPL-SCNC: 133 MMOL/L (ref 135–147)
TRIGL SERPL-MCNC: 153 MG/DL

## 2022-09-13 PROCEDURE — 82043 UR ALBUMIN QUANTITATIVE: CPT

## 2022-09-13 PROCEDURE — 80048 BASIC METABOLIC PNL TOTAL CA: CPT

## 2022-09-13 PROCEDURE — 82570 ASSAY OF URINE CREATININE: CPT

## 2022-09-13 PROCEDURE — 36415 COLL VENOUS BLD VENIPUNCTURE: CPT

## 2022-09-13 PROCEDURE — 80061 LIPID PANEL: CPT

## 2022-09-13 PROCEDURE — 83036 HEMOGLOBIN GLYCOSYLATED A1C: CPT

## 2022-09-14 ENCOUNTER — OFFICE VISIT (OUTPATIENT)
Dept: ENDOCRINOLOGY | Facility: CLINIC | Age: 62
End: 2022-09-14
Payer: COMMERCIAL

## 2022-09-14 VITALS
WEIGHT: 263.3 LBS | SYSTOLIC BLOOD PRESSURE: 128 MMHG | HEART RATE: 99 BPM | BODY MASS INDEX: 43.82 KG/M2 | DIASTOLIC BLOOD PRESSURE: 66 MMHG

## 2022-09-14 DIAGNOSIS — E11.65 TYPE 2 DIABETES MELLITUS WITH HYPERGLYCEMIA, WITHOUT LONG-TERM CURRENT USE OF INSULIN (HCC): Primary | ICD-10-CM

## 2022-09-14 DIAGNOSIS — I10 ESSENTIAL HYPERTENSION: ICD-10-CM

## 2022-09-14 DIAGNOSIS — E78.2 MIXED HYPERLIPIDEMIA: ICD-10-CM

## 2022-09-14 DIAGNOSIS — E04.1 THYROID NODULE INCIDENTALLY NOTED ON IMAGING STUDY: ICD-10-CM

## 2022-09-14 PROCEDURE — 99214 OFFICE O/P EST MOD 30 MIN: CPT | Performed by: PHYSICIAN ASSISTANT

## 2022-09-14 PROCEDURE — 95251 CONT GLUC MNTR ANALYSIS I&R: CPT | Performed by: PHYSICIAN ASSISTANT

## 2022-09-14 NOTE — PROGRESS NOTES
Patient Progress Note      CC: DM     Referring Provider  Ranjit Callahan Md  [facility], [place], operated by [facility],  [address]     History of Present Illness:   Yeyo Rizvi is a 65 y o  female with a history of type 2 diabetes without long term use of insulin  She has been off of insulin since her gastric sleeve surgery  Diabetes course has been stable  She was hospitalized in August 2022 for vestibular issues and possible TIA  Denies any issues with her current regimen  Home glucose monitoring: are performed regularly, using [employer]  CGM is active 61% of the time  Average glucose 168 mg/dl, in target range 68%, above range 32%, below range 0%     Current regimen: metformin 1000 mg BID, Farxiga 10 mg daily, glipizide 2 5 mg daily  compliant most of the time, denies any side effects from medications  Hypoglycemic episodes: No, rare  H/o of hypoglycemia causing hospitalization or Intervention such as glucagon injection or ambulance call:  No  Hypoglycemia symptoms: jitteriness  Treatment of hypoglycemia: discussed treatment     Medic alert tag: recommended: Yes     Diabetes education: Not recently  Diet: 2-3 meals per day, 1-2 snacks per day  Timing of meals is predictable  Diabetic diet compliance:  noncompliant some of the time  Activity: Daily activity is predictable: Yes  She is trying to walk 2-3 days a week  Ophthamology: has an appointment in Dec 2021  Pat Lucas in [place], [place]  Podiatry: Nov 2021      Has hypertension: on ACE inhibitor/ARB, compliant most of the time  Has hyperlipidemia: on statin - tolerating well, no myalgias  compliant most of the time, denies any side effects from medications  Thyroid disorders: Thyroid nodule  US done August 2022   FINDINGS:  Normal homogeneous smooth echotexture      Right lobe: 5 6 x 1 9 x 2 3 cm  Volume 11 3 mL  Left lobe:  4 2 x 1 5 x 1 6 cm  Volume 4 8 mL  Isthmus: 0 6  cm      Nodule #1  Image 24    RIGHT upper pole nodule measuring 2 4 x 0 9 x 1 5 cm  COMPOSITION:  2 points, solid or almost completely solid   ECHOGENICITY:  1 point, hyperechoic or isoechoic  SHAPE:  0 points, wider-than-tall  MARGIN: 0 points, ill-defined  ECHOGENIC FOCI:  0 points, none or large comet-tail artifacts  TI-RADS Classification: TR 3 (3 points), FNA if >2 5 cm  Follow if >1 5 cm      There are additional nodules of lesser size and/or TI-RADS score  These do not necessitate additional evaluation based on ACR criteria       IMPRESSION:     2 4 cm right upper pole thyroid nodule      No nodule meets current ACR criteria for requiring biopsy but followup ultrasound is recommended in 1 year       History of pancreatitis: Yes (from [place])    Patient Active Problem List   Diagnosis    Type 2 diabetes mellitus ([address] )    Essential hypertension    Mixed hyperlipidemia    Type 2 diabetes mellitus with hyperglycemia, without long-term current use of insulin (HCC)    Vertigo    Tinnitus of left ear    Numbness and tingling of left side of face    Class 3 severe obesity in adult [facility])    Thyroid nodule incidentally noted on imaging study      Past Medical History:   Diagnosis Date    Acid reflux     CKD (chronic kidney disease), stage II     Diabetes mellitus (HCC)     Dizziness     Essential hypertension     Hyperlipidemia     Hypertension     Type 2 diabetes mellitus ([address] )       Past Surgical History:   Procedure Laterality Date    ANKLE SURGERY  Nov 2019    ARTHROSCOPIC REPAIR ACL Right     knee    BARIATRIC SURGERY      gastric sleeve    GALLBLADDER SURGERY      SKIN BIOPSY      scalp    TENDON REPAIR Left     ankle    TONSILLECTOMY        Family History   Problem Relation Age of Onset    Hypertension Mother     Diabetes type II Father     Diabetes Father     Diabetes type II Paternal Grandmother     Cancer Paternal Grandmother     Breast cancer Paternal Grandmother 79    Breast cancer Paternal Aunt 76     Social History     Tobacco Use  Smoking status: Never Smoker    Smokeless tobacco: Never Used   Substance Use Topics    Alcohol use: Yes     Alcohol/week: 5 0 standard drinks     Types: 5 Glasses of wine per week     Comment: occ        Allergies   Allergen Reactions    Ampicillin Hives    Darvon [Propoxyphene] Vomiting    Lipitor [Atorvastatin] Arthralgia    Vioxx [Rofecoxib]          Current Outpatient Medications:     amitriptyline (ELAVIL) 10 mg tablet, Take 1 tablet (10 mg total) by mouth daily at bedtime (Patient taking differently: Take 25 mg by mouth daily at bedtime), Disp: 30 tablet, Rfl: 2    Biotin 1000 MCG CHEW, Chew 6,000 mcg, Disp: , Rfl:     Calcium 500 MG tablet, Take by mouth, Disp: , Rfl:     Continuous Blood Gluc  (FREESTYLE FRANKI 14 DAY READER) SUZY, by Does not apply route, Disp: , Rfl:     Continuous Blood Gluc Sensor (FreeStyle Franki 14 Day Sensor) MISC, USE 1 DEVICE EVERY 14 (FOURTEEN) DAYS, Disp: 6 each, Rfl: 1    Farxiga 10 MG TABS, TAKE 1 TABLET BY MOUTH EVERY DAY, Disp: 30 tablet, Rfl: 5    glipiZIDE (GLUCOTROL XL) 2 5 mg 24 hr tablet, TAKE 1 TABLET BY MOUTH EVERY DAY, Disp: 90 tablet, Rfl: 1    lansoprazole (PREVACID) 15 mg capsule, Take 15 mg by mouth daily, Disp: , Rfl:     lisinopril (ZESTRIL) 2 5 mg tablet, Take 2 5 mg by mouth daily, Disp: , Rfl:     meclizine (ANTIVERT) 25 mg tablet, Take 1 tablet (25 mg total) by mouth 3 (three) times a day as needed for dizziness, Disp: 30 tablet, Rfl: 0    metFORMIN (GLUCOPHAGE-XR) 500 mg 24 hr tablet, TAKE 2 TABLETS BY MOUTH TWICE A DAY WITH FOOD, Disp: 120 tablet, Rfl: 5    Multiple Vitamins-Minerals (MULTIVITAMIN WITH MINERALS) tablet, Take 1 tablet by mouth daily, Disp: , Rfl:     nystatin (MYCOSTATIN) cream, Apply topically 2 (two) times a day, Disp: , Rfl:     ondansetron (ZOFRAN) 4 mg tablet, Take 1 tablet (4 mg total) by mouth every 6 (six) hours, Disp: 12 tablet, Rfl: 0    Probiotic Product (ALIGN) 4 MG CAPS, Take by mouth, Disp: , Rfl:     rosuvastatin (CRESTOR) 5 mg tablet, Take 5 mg by mouth daily, Disp: , Rfl:     vitamin B-12 (VITAMIN B-12) 1,000 mcg tablet, Take 10,000 mcg by mouth daily Takes 1 daily, Disp: , Rfl:     zolpidem (AMBIEN) 5 mg tablet, Take 2 5 mg by mouth daily at bedtime as needed for sleep, Disp: , Rfl:     Cholecalciferol 50 MCG (2000 UT) CAPS, Take 1,000 Units by mouth  (Patient not taking: Reported on 9/14/2022), Disp: , Rfl:     predniSONE 10 mg tablet, Take 30 mg daily for 3 days  Then take 20 mg daily for 3 days  Then take 10 mg daily for 4 days  Then stop  (Patient not taking: Reported on 9/14/2022), Disp: 19 tablet, Rfl: 0  Review of Systems   Constitutional: Negative for activity change, appetite change, fatigue and unexpected weight change  HENT: Negative for trouble swallowing  Eyes: Negative for visual disturbance  Respiratory: Negative for shortness of breath  Cardiovascular: Negative for chest pain and palpitations  Gastrointestinal: Negative for constipation and diarrhea  Endocrine: Negative for polydipsia and polyuria  Musculoskeletal: Positive for arthralgias  Skin: Negative for wound  Neurological: Negative for numbness  Psychiatric/Behavioral: Negative  Physical Exam:  Body mass index is 43 82 kg/m²  /66   Pulse 99   Wt 119 kg (263 lb 4 8 oz)   BMI 43 82 kg/m²    Wt Readings from Last 3 Encounters:   09/14/22 119 kg (263 lb 4 8 oz)   08/17/22 119 kg (263 lb)   08/11/22 120 kg (263 lb 14 3 oz)       Physical Exam  Vitals and nursing note reviewed  Constitutional:       Appearance: She is well-developed  HENT:      Head: Normocephalic  Eyes:      General: No scleral icterus  Pupils: Pupils are equal, round, and reactive to light  Neck:      Thyroid: No thyromegaly  Cardiovascular:      Rate and Rhythm: Normal rate and regular rhythm  Pulses:           Radial pulses are 2+ on the right side and 2+ on the left side        Heart sounds: No murmur heard   Pulmonary:      Effort: Pulmonary effort is normal  No respiratory distress  Breath sounds: Normal breath sounds  No wheezing  Musculoskeletal:      Cervical back: Neck supple  Skin:     General: Skin is warm and dry  Neurological:      Mental Status: She is alert  Patient's shoes and socks were not removed  Labs:   Component      Latest Ref Rng & Units 4/26/2022 9/13/2022   Sodium      135 - 147 mmol/L 136 133 (L)   Potassium      3 5 - 5 3 mmol/L 4 5 4 5   Chloride      96 - 108 mmol/L 101 100   CO2      21 - 32 mmol/L 31 26   Anion Gap      4 - 13 mmol/L 4 7   BUN      5 - 25 mg/dL 20 15   Creatinine      0 60 - 1 30 mg/dL 0 90 0 83   GLUCOSE FASTING      65 - 99 mg/dL 179 (H) 186 (H)   Calcium      8 3 - 10 1 mg/dL 9 9 9 4   eGFR      ml/min/1 73sq m 69 76   Cholesterol      See Comment mg/dL  148   Triglycerides      See Comment mg/dL  153 (H)   HDL      >=50 mg/dL  53   LDL Calculated      0 - 100 mg/dL  64   Non-HDL Cholesterol      mg/dl  95   EXT Creatinine Urine      mg/dL  149 0   MICROALBUM ,U,RANDOM      0 0 - 20 0 mg/L  70 4 (H)   MICROALBUMIN/CREATININE RATIO      0 - 30 mg/g creatinine  47 (H)   Hemoglobin A1C      Normal 3 8-5 6%; PreDiabetic 5 7-6 4%; Diabetic >=6 5%; Glycemic control for adults with diabetes <7 0% % 7 3 (H) 7 1 (H)   eAG, EST AVG Glucose      mg/dl 163 157       Plan:    Diagnoses and all orders for this visit:    Type 2 diabetes mellitus with hyperglycemia, without long-term current use of insulin ([facility])  HGA1C 7 1%  Improved  Treatment regimen: continue current treatment  Episodes of hypoglycemia can lead to permanent disability and death  Discussed risks/complications associated with uncontrolled diabetes  Advised to adhere to diabetic diet, and recommended staying active/exercising routinely as tolerated  Keep carbohydrates consistent to limit blood glucose fluctuations    Advised to call if blood sugars less than 70 mg/dl or over 250 mg/dl    Check blood glucose 3+ times a day  Discussed symptoms and treatment of hypoglycemia  Discussed use of CGM to collect additional blood glucose data to reveal trends and patterns that can be used to optimize treatment plan  Recommended routine follow-up with podiatry and ophthalmology  Ordered blood work to complete prior to next visit  -     Hemoglobin A1C; Future  -     Basic metabolic panel; Future    Essential hypertension  Blood pressure adequately controlled, continue current treatment  -     Basic metabolic panel; Future    Mixed hyperlipidemia  LDL 64  Continue statin therapy    Thyroid nodule incidentally noted on imaging study  Ultrasound done in August 2022 shows a 2 4 cm right upper pole thyroid nodule which does not meet criteria for biopsy but follow-up ultrasound is recommended in 1 year  Discussed with the patient diagnosis and treatment and all questions fully answered  She will call me if any problems arise  Counseled patient on diagnostic results, prognosis, risk and benefit of treatment options, instruction for management, importance of treatment compliance, risk factor reduction and impressions        Brenda Mary PA-C

## 2022-09-14 NOTE — PATIENT INSTRUCTIONS
Hypoglycemia instructions   aHnsel Waters  9/14/2022  [id number]    Low Blood Sugar    Steps to treat low blood sugar  1  Test blood sugar if you have symptoms of low blood sugar:   Low Blood Sugar Symptoms:  o Sweaty  o Dizzy  o Rapid heartbeat  o Shaky  o Bad mood  o Hungry      2  Treat blood sugar less than 70 with 15 grams of fast-acting carbohydrate:   Examples of 15 grams Fast-Acting Carbohydrate:  o 4 oz juice  o 4 oz regular soda  o 3-4 glucose tablets (chew)  o 3-4 hard candies (chew)          3  Wait 15 minutes and test your blood sugar again     4   If blood sugar is less than 100, repeat steps 2-3     5  When your blood sugar is 100 or more, eat a snack if it will be longer than one hour until your next meal  The snack should be 15 grams of carbohydrate and a protein:   Examples of snacks:  o ½ sandwich  o 6 crackers with cheese  o Piece of fruit with cheese or peanut butter  o 6 crackers with peanut butter

## 2022-09-15 ENCOUNTER — OFFICE VISIT (OUTPATIENT)
Dept: PHYSICAL THERAPY | Age: 62
End: 2022-09-15
Payer: COMMERCIAL

## 2022-09-15 DIAGNOSIS — R42 VERTIGO: ICD-10-CM

## 2022-09-15 DIAGNOSIS — R26.9 ABNORMALITY OF GAIT: Primary | ICD-10-CM

## 2022-09-15 PROCEDURE — 97140 MANUAL THERAPY 1/> REGIONS: CPT

## 2022-09-15 PROCEDURE — 97110 THERAPEUTIC EXERCISES: CPT

## 2022-09-16 NOTE — PROGRESS NOTES
Daily Note     Today's date: 9/15/2022  Patient name: Asaf Ashton  : 1960  MRN: [id number]  Referring provider: Marcellus Hardy PA-C  Dx:   Encounter Diagnosis     ICD-10-CM    1  Abnormality of gait  R26 9    2  Vertigo  R42                   Subjective: "my ear is feeling full again and it concerns me as I'm supposed to go back to work on Monday " Marcos CARDENAS PT supervising units  Objective: See treatment diary below      Assessment: Tolerated treatment well  Patient would benefit from continued PT      Plan: Continue per plan of care  Precautions:      Allergies  Reviewed by Shivani Jacobo at 10:56 PM   Severity Reactions Comments   Ampicillin Not Specified Hives    Darvon [propoxyphene] Not Specified Vomiting    Lipitor [atorvastatin] Not Specified Arthralgia    Vioxx [rofecoxib] Not Specified       PMH: DM type 2, essential HTN, vertigo, tinnitus left ear, numbness left side of face, CKD stage 2, acid reflux, 2019 ankle surgery, right arthroscopic ACL repair right le, bariatric gastric sleeve surgery, gall bladder surgery, tendon repair, tonsillectomy      Manuals 8/23/22 8/24 8/29 8/31 9/12 9/15       graston c spine  I eval  man 13 min 13 min man 13 min man 15 min        Suboccipital release man  man man 13 min  man man       Myofascial release c frontalis, temporalis, manual neck traction  man man  periscapular trigger point release Man with trigger point release man           Retest rich hallpike neg , rolling neg         Neuro Re-Ed/ there exer             Tandem gait 1 min  1 min x 1 1 min  1min x 1 1 min x 1        Tandem stance 30 sec x 3 30 sec x 3 20sec x 5 30 sec x 3 30 sec x 3        Chin tucks  5 reps 5 sec hold  5 reps  2 x 10 At home 5 reps 5 sec hold  5       Upper trap stretch, levator stretch, ant scalene stretch  3 reps hold 10 sec 20sec x 5          scm stretch  3 reps 10 sec hold 20sec x 5          Cervical isometrics  5 reps 5sec hold  5sec x 5 5 reps 5 sec 5 reps 5 sec Ankle sway referencing flat and foam eyes open , eyes closed   10 reps each 10x 10 reps  10 reps each green discs         Ther Ex             Squats look thru blinds  3 sets of 10  3 x 10 3 x 10 3 x 10 3 x 10       Walk and toss ball to self , walk and toss ball side to side    40 ft x 2 40 ft x 2 40 ft x 2       Flex/ ext, ,, rotation ccw and cw circles with ball in standing habituation exer    10 reps each 10 reps  10 reps        Treadmill vertic and horizontal head turns      10 set of 10 1 2 mph                                                           Ther Activity                                       Gait Training                                       Modalities              prn to c spine   No charge   No charge

## 2022-09-19 ENCOUNTER — OFFICE VISIT (OUTPATIENT)
Dept: PHYSICAL THERAPY | Age: 62
End: 2022-09-19
Payer: COMMERCIAL

## 2022-09-19 DIAGNOSIS — R26.9 ABNORMALITY OF GAIT: Primary | ICD-10-CM

## 2022-09-19 DIAGNOSIS — R42 VERTIGO: ICD-10-CM

## 2022-09-19 PROCEDURE — 97140 MANUAL THERAPY 1/> REGIONS: CPT

## 2022-09-19 NOTE — PROGRESS NOTES
Daily Note     Today's date: 2022  Patient name: Stacie Nguyen  : 1960  MRN: [id number]  Referring provider: Gokul Jones PA-C  Dx:   Encounter Diagnosis     ICD-10-CM    1  Abnormality of gait  R26 9    2  Vertigo  R42                   Subjective: Pt having vertigo attack in the parking lot of PT this am   PT going out to the car - pt given water and taking anti nausea medicine and meclazine   She reports now being on augmentin after seeing the ENT Dr Desmond Bermudez on 22 with much discharge noted ( 3 days of medicine completed to date  Objective: See treatment diary below      Assessment: Tolerated treatment fair  Patient would benefit from continued PT  Pt to try and follow Meniere's diet avoid salt, caffeine, processed foot with sulfites, msg etc , preservatives in them  Discussion with her ENT reported and PT today about Meniere's disease with pt reporting the noise in her left ear increasing then onset vertigo  In addition th cars moving on her left increasing her vertigo while she was in her car with her spouse driving  Plan: Continue per plan of care  Precautions:      Allergies  Reviewed by Rocael Bone at 10:56 PM   Severity Reactions Comments   Ampicillin Not Specified Hives    Darvon [propoxyphene] Not Specified Vomiting    Lipitor [atorvastatin] Not Specified Arthralgia    Vioxx [rofecoxib] Not Specified       PMH: DM type 2, essential HTN, vertigo, tinnitus left ear, numbness left side of face, CKD stage 2, acid reflux, 2019 ankle surgery, right arthroscopic ACL repair right le, bariatric gastric sleeve surgery, gall bladder surgery, tendon repair, tonsillectomy      Manuals 8/23/22 8/24 8/29 8/31 9/12 9/15 9/19      graston c spine  I eval  man 13 min 13 min man 13 min man 15 min  [id number] [address] [place] min       Suboccipital release man  man man 13 min  man man man      Myofascial release c frontalis, temporalis, manual neck traction  man man  periscapular trigger point release Man with trigger point release man man  total 4 5min man          Retest rich hallpike neg , rolling neg         Neuro Re-Ed/ there exer             Tandem gait 1 min  1 min x 1 1 min  1min x 1 1 min x 1        Tandem stance 30 sec x 3 30 sec x 3 20sec x 5 30 sec x 3 30 sec x 3        Chin tucks  5 reps 5 sec hold  5 reps  2 x 10 At home 5 reps 5 sec hold  5       Upper trap stretch, levator stretch, ant scalene stretch  3 reps hold 10 sec 20sec x 5          scm stretch  3 reps 10 sec hold 20sec x 5          Cervical isometrics  5 reps 5sec hold  5sec x 5 5 reps 5 sec 5 reps 5 sec        Ankle sway referencing flat and foam eyes open , eyes closed   10 reps each 10x 10 reps  10 reps each green discs         Ther Ex             Squats look thru blinds  3 sets of 10  3 x 10 3 x 10 3 x 10 3 x 10       Walk and toss ball to self , walk and toss ball side to side    40 ft x 2 40 ft x 2 40 ft x 2       Flex/ ext, ,, rotation ccw and cw circles with ball in standing habituation exer    10 reps each 10 reps  10 reps        Treadmill vertic and horizontal head turns      10 set of 10 1 2 mph                                                           Ther Activity                                       Gait Training                                       Modalities              prn to c spine   No charge   No charge

## 2022-09-21 ENCOUNTER — OFFICE VISIT (OUTPATIENT)
Dept: PHYSICAL THERAPY | Age: 62
End: 2022-09-21
Payer: COMMERCIAL

## 2022-09-21 DIAGNOSIS — R42 VERTIGO: Primary | ICD-10-CM

## 2022-09-21 PROCEDURE — 97140 MANUAL THERAPY 1/> REGIONS: CPT

## 2022-09-21 PROCEDURE — 97110 THERAPEUTIC EXERCISES: CPT

## 2022-09-28 ENCOUNTER — OFFICE VISIT (OUTPATIENT)
Dept: PHYSICAL THERAPY | Age: 62
End: 2022-09-28
Payer: COMMERCIAL

## 2022-09-28 DIAGNOSIS — R26.9 ABNORMALITY OF GAIT: ICD-10-CM

## 2022-09-28 DIAGNOSIS — R42 VERTIGO: Primary | ICD-10-CM

## 2022-09-28 PROCEDURE — 97110 THERAPEUTIC EXERCISES: CPT

## 2022-09-28 NOTE — PROGRESS NOTES
Daily Note     Today's date: 2022  Patient name: Fili Jeong  : 1960  MRN: [id number]  Referring provider: Peterson Ferreira PA-C  Dx:   Encounter Diagnosis     ICD-10-CM    1  Vertigo  R42    2  Abnormality of gait  R26 9                   Subjective: "I still have congestion but it is better " Pt to discuss with her doctor congestion status and need for possible additional medication   Objective: See treatment diary below      Assessment: Tolerated treatment well  Patient demonstrated fatigue post treatment  She has returned to work at this time with ok tolerance per her report  Plan: Continue per plan of care  Precautions:      Allergies  Reviewed by Walter Wilson at 10:56 PM   Severity Reactions Comments   Ampicillin Not Specified Hives    Darvon [propoxyphene] Not Specified Vomiting    Lipitor [atorvastatin] Not Specified Arthralgia    Vioxx [rofecoxib] Not Specified       PMH: DM type 2, essential HTN, vertigo, tinnitus left ear, numbness left side of face, CKD stage 2, acid reflux, 2019 ankle surgery, right arthroscopic ACL repair right le, bariatric gastric sleeve surgery, gall bladder surgery, tendon repair, tonsillectomy      Manuals 8/23/22 8/24 8/29 8/31 9/12 9/15 9/19 9/21 9/28    [facility]  I eval  man 15 min 15 min man 15 min man 15 min  30 min  15 min     Suboccipital release man  man man 13 min  man man man      Myofascial release c frontalis, temporalis, manual neck traction  man man  periscapular trigger point release Man with trigger point release man man  total 4 5min man          Retest rich hallpike neg , rolling neg         Neuro Re-Ed/ there exer             Tandem gait 1 min  1 min x 1 1 min  1min x 1 1 min x 1   2 min 2 min    Tandem stance 30 sec x 3 30 sec x 3 20sec x 5 30 sec x 3 30 sec x 3   20sec x 5 30sec x 3    Chin tucks  5 reps 5 sec hold  5 reps  2 x 10 At home 5 reps 5 sec hold  5       Upper trap stretch, levator stretch, ant scalene stretch  3 reps hold 10 sec 20sec x 5     20sec x 5     scm stretch  3 reps 10 sec hold 20sec x 5     20sec x 5     Cervical isometrics  5 reps 5sec hold  5sec x 5 5 reps 5 sec 5 reps 5 sec        Ankle sway referencing flat and foam eyes open , eyes closed   10 reps each 10x 10 reps  10 reps each green discs     10     Ther Ex             Squats look thru blinds  3 sets of 10  3 x 10 3 x 10 3 x 10 3 x 10  30x 30    Walk and toss ball to self , walk and toss ball side to side    40 ft x 2 40 ft x 2 40 ft x 2  40ft x 2 40 ft x 2    Flex/ ext, ,, rotation ccw and cw circles with ball in standing habituation exer    10 reps each 10 reps  10 reps   10x 10 x    Treadmill vertic and horizontal head turns      10 set of 10 1 2 mph   10 sets of 10     Hallway right left ball toss , and sidestepping ball toss in front         100 ft x 2    Trampoline static, double bounce and jogging with ball toss          30 reps each    Foam standing vertical and horizontal head turns          10 reps                  Ther Activity                                       Gait Training                                       Modalities              prn to c spine   No charge   No charge

## 2022-09-29 ENCOUNTER — OFFICE VISIT (OUTPATIENT)
Dept: PHYSICAL THERAPY | Age: 62
End: 2022-09-29
Payer: COMMERCIAL

## 2022-09-29 DIAGNOSIS — R42 VERTIGO: Primary | ICD-10-CM

## 2022-09-29 PROCEDURE — 97110 THERAPEUTIC EXERCISES: CPT

## 2022-09-29 NOTE — PROGRESS NOTES
Daily Note     Today's date: 2022  Patient name: Heidy Nicholson  : 1960  MRN: [id number]  Referring provider: Erica Le PA-C  Dx:   Encounter Diagnosis     ICD-10-CM    1  Vertigo  R42                   Subjective: Pt  Reports she felt "woozy" for an hour this a m  Jose Angel Kenneth Also states  Noise in her ear is a little less today  Objective: See treatment diary below      Assessment: Tolerated treatment well  Patient exhibited good technique with therapeutic exercises and would benefit from continued PT      Plan: Continue per plan of care  Precautions:      Allergies  Reviewed by Crow Ulrich at 10:56 PM   Severity Reactions Comments   Ampicillin Not Specified Hives    Darvon [propoxyphene] Not Specified Vomiting    Lipitor [atorvastatin] Not Specified Arthralgia    Vioxx [rofecoxib] Not Specified       PMH: DM type 2, essential HTN, vertigo, tinnitus left ear, numbness left side of face, CKD stage 2, acid reflux, 2019 ankle surgery, right arthroscopic ACL repair right le, bariatric gastric sleeve surgery, gall bladder surgery, tendon repair, tonsillectomy      Manuals 8/23/22 8/24 8/29 8/31 9/12 9/15 9/19 9/21 9/28 9/29   [facility]  I eval  man 13 min 15 min man 15 min man 15 min  30 min  15 min     Suboccipital release man  man man 13 min  man man man      Myofascial release c frontalis, temporalis, manual neck traction  man man  periscapular trigger point release Man with trigger point release man man  total 4 5min man          Retest rich hallpike neg , rolling neg         Neuro Re-Ed/ there exer             Tandem gait 1 min  1 min x 1 1 min  1min x 1 1 min x 1   2 min 2 min 1 min   Tandem stance 30 sec x 3 30 sec x 3 20sec x 5 30 sec x 3 30 sec x 3   20sec x 5 30sec x 3 20sec x 5   Chin tucks  5 reps 5 sec hold  5 reps  2 x 10 At home 5 reps 5 sec hold  5    20x   Upper trap stretch, levator stretch, ant scalene stretch  3 reps hold 10 sec 20sec x 5     20sec x 5  HEP   scm stretch  3 reps 10 sec hold 20sec x 5     20sec x 5  HEP   Cervical isometrics  5 reps 5sec hold  5sec x 5 5 reps 5 sec 5 reps 5 sec     5sec x 5   Ankle sway referencing flat and foam eyes open , eyes closed   10 reps each 10x 10 reps  10 reps each green discs     10     Ther Ex             Squats look thru blinds  3 sets of 10  3 x 10 3 x 10 3 x 10 3 x 10  30x 30 30x   Walk and toss ball to self , walk and toss ball side to side    40 ft x 2 40 ft x 2 40 ft x 2  40ft x 2 40 ft x 2    Flex/ ext, ,, rotation ccw and cw circles with ball in standing habituation exer    10 reps each 10 reps  10 reps   10x 10 x    Treadmill vertic and horizontal head turns      10 set of 10 1 2 mph   10 sets of 10  10 min   Hallway right left ball toss , and sidestepping ball toss in front         100 ft x 2    Trampoline static, double bounce and jogging with ball toss          30 reps each    Foam standing vertical and horizontal head turns          10 reps                  Ther Activity                                       Gait Training                                       Modalities              prn to c spine   No charge   No charge

## 2022-09-30 DIAGNOSIS — E11.65 UNCONTROLLED TYPE 2 DIABETES MELLITUS WITH HYPERGLYCEMIA (HCC): ICD-10-CM

## 2022-09-30 RX ORDER — METFORMIN HYDROCHLORIDE 500 MG/1
TABLET, EXTENDED RELEASE ORAL
Qty: 360 TABLET | Refills: 2 | Status: SHIPPED | OUTPATIENT
Start: 2022-09-30

## 2022-10-04 ENCOUNTER — OFFICE VISIT (OUTPATIENT)
Dept: PHYSICAL THERAPY | Age: 62
End: 2022-10-04
Payer: COMMERCIAL

## 2022-10-04 DIAGNOSIS — R42 VERTIGO: Primary | ICD-10-CM

## 2022-10-04 DIAGNOSIS — R26.9 ABNORMALITY OF GAIT: ICD-10-CM

## 2022-10-04 PROCEDURE — 97110 THERAPEUTIC EXERCISES: CPT

## 2022-10-05 NOTE — PROGRESS NOTES
Daily Note     Today's date: 10/4/2022  Patient name: Cristian Bonner  : 1960  MRN: [id number]  Referring provider: Darrel Tamayo PA-C  Dx:   Encounter Diagnosis     ICD-10-CM    1  Vertigo  R42    2  Abnormality of gait  R26 9                   Subjective: "I feel really great, no congestion , no ringing in the ears and no vertigo today  Objective: See treatment diary below      Assessment: Tolerated treatment well  Patient would benefit from continued PT      Plan: Continue per plan of care  Precautions:      Allergies  Reviewed by Miley Lane at 10:56 PM   Severity Reactions Comments   Ampicillin Not Specified Hives    Darvon [propoxyphene] Not Specified Vomiting    Lipitor [atorvastatin] Not Specified Arthralgia    Vioxx [rofecoxib] Not Specified       PMH: DM type 2, essential HTN, vertigo, tinnitus left ear, numbness left side of face, CKD stage 2, acid reflux, 2019 ankle surgery, right arthroscopic ACL repair right le, bariatric gastric sleeve surgery, gall bladder surgery, tendon repair, tonsillectomy      Manuals 10/4 8/24 8/29 8/31 9/12 9/15 9/19 9/21 9/28 9/29   [facility]    man 13 min 15 min man 15 min man 15 min  30 min  15 min     Suboccipital release man   man 13 min  man man man      Myofascial release c frontalis, temporalis, manual neck traction   man  periscapular trigger point release Man with trigger point release man man  total 4 5min man          Retest rich hallpike neg , rolling neg         Neuro Re-Ed/ there exer             Tandem gait 1 min  1 min x 1 1 min  1min x 1 1 min x 1   2 min 2 min 1 min   Tandem stance 30 sec x 3 30 sec x 3 20sec x 5 30 sec x 3 30 sec x 3   20sec x 5 30sec x 3 20sec x 5   Chin tucks  5 reps 5 sec hold  5 reps  2 x 10 At home 5 reps 5 sec hold  5    20x   Upper trap stretch, levator stretch, ant scalene stretch  3 reps hold 10 sec 20sec x 5     20sec x 5  HEP   scm stretch  3 reps 10 sec hold 20sec x 5     20sec x 5  HEP   Cervical isometrics 5 reps 5sec hold  5sec x 5 5 reps 5 sec 5 reps 5 sec     5sec x 5   Ankle sway referencing flat and foam eyes open , eyes closed   10 reps each 10x 10 reps  10 reps each green discs     10     Ther Ex             Squats look thru blinds 3 x 10  3 sets of 10  3 x 10 3 x 10 3 x 10 3 x 10  30x 30 30x   Walk and toss ball to self , walk and toss ball side to side 40 ft x 2   40 ft x 2 40 ft x 2 40 ft x 2  40ft x 2 40 ft x 2    Flex/ ext, ,, rotation ccw and cw circles with ball in standing habituation exer 10 reps   10 reps each 10 reps  10 reps   10x 10 x    Treadmill vertic and horizontal head turns 15 min 10 sets of 10     10 set of 10 1 2 mph   10 sets of 10  10 min   Hallway right left ball toss , and sidestepping ball toss in front 100 ft x 2        100 ft x 2    Trampoline static, double bounce and jogging with ball toss  30 reps each        30 reps each    Foam standing vertical and horizontal head turns  10 reps         10 reps                  Ther Activity                                       Gait Training                                       Modalities              prn to c spine   No charge   No charge

## 2022-10-06 ENCOUNTER — OFFICE VISIT (OUTPATIENT)
Dept: PHYSICAL THERAPY | Age: 62
End: 2022-10-06
Payer: COMMERCIAL

## 2022-10-06 DIAGNOSIS — E11.65 TYPE 2 DIABETES MELLITUS WITH HYPERGLYCEMIA, WITHOUT LONG-TERM CURRENT USE OF INSULIN (HCC): ICD-10-CM

## 2022-10-06 DIAGNOSIS — R42 VERTIGO: Primary | ICD-10-CM

## 2022-10-06 PROCEDURE — 97140 MANUAL THERAPY 1/> REGIONS: CPT

## 2022-10-06 PROCEDURE — 97110 THERAPEUTIC EXERCISES: CPT

## 2022-10-06 RX ORDER — DAPAGLIFLOZIN 10 MG/1
TABLET, FILM COATED ORAL
Qty: 30 TABLET | Refills: 5 | Status: SHIPPED | OUTPATIENT
Start: 2022-10-06

## 2022-10-06 NOTE — PROGRESS NOTES
Daily Note     Today's date: 10/6/2022  Patient name: Tien Garsia  : 1960  MRN: [id number]  Referring provider: Merary Hurtado PA-C  Dx:   Encounter Diagnosis     ICD-10-CM    1  Vertigo  R42                   Subjective: Pt  Is independent with all ADL's and denies dizziness with activities  Objective: See treatment diary below      Assessment: Tolerated treatment well  Patient would benefit from continued PT      Plan: Continue per plan of care  Precautions:      Allergies  Reviewed by Jayleen Yoder at 10:56 PM   Severity Reactions Comments   Ampicillin Not Specified Hives    Darvon [propoxyphene] Not Specified Vomiting    Lipitor [atorvastatin] Not Specified Arthralgia    Vioxx [rofecoxib] Not Specified       PMH: DM type 2, essential HTN, vertigo, tinnitus left ear, numbness left side of face, CKD stage 2, acid reflux, 2019 ankle surgery, right arthroscopic ACL repair right le, bariatric gastric sleeve surgery, gall bladder surgery, tendon repair, tonsillectomy      Manuals 10/4 10/6 8/29 8/31 9/12 9/15 9/19 9/21 9/28 9/29   [facility]     15 min 15 min man 15 min man 15 min  30 min  15 min     Suboccipital release man    13 min  man man man      Myofascial release c frontalis, temporalis, manual neck traction   Neg rich [place] R[employer]  periscapular trigger point release Man with trigger point release man man  total 4 5min man          Retest rich hallpike neg , rolling neg         Neuro Re-Ed/ there exer             Tandem gait 1 min  1 min x 1 1 min  1min x 1 1 min x 1   2 min 2 min 1 min   Tandem stance 30 sec x 3 30 sec x 3 20sec x 5 30 sec x 3 30 sec x 3   20sec x 5 30sec x 3 20sec x 5   Chin tucks  5 reps 5 sec hold  5 reps  2 x 10 At home 5 reps 5 sec hold  5    20x   Upper trap stretch, levator stretch, ant scalene stretch  3 reps hold 10 sec 20sec x 5     20sec x 5  HEP   scm stretch  3 reps 10 sec hold 20sec x 5     20sec x 5  HEP   Cervical isometrics  5 reps 5sec hold  5sec x 5 5 reps 5 sec 5 reps 5 sec     5sec x 5   Ankle sway referencing flat and foam eyes open , eyes closed  10x 10 reps each 10x 10 reps  10 reps each green discs     10     Ther Ex             Squats look thru blinds 3 x 10  3 sets of 10  3 x 10 3 x 10 3 x 10 3 x 10  30x 30 30x   Walk and toss ball to self , walk and toss ball side to side 40 ft x 2   40 ft x 2 40 ft x 2 40 ft x 2  40ft x 2 40 ft x 2    Flex/ ext, ,, rotation ccw and cw circles with ball in standing habituation exer 10 reps   10 reps each 10 reps  10 reps   10x 10 x    Treadmill vertic and horizontal head turns 15 min 10 sets of 10     10 set of 10 1 2 mph   10 sets of 10  10 min   Hallway right left ball toss , and sidestepping ball toss in front 100 ft x 2        100 ft x 2    Trampoline static, double bounce and jogging with ball toss  30 reps each        30 reps each    Foam standing vertical and horizontal head turns  10 reps         10 reps     phisio bal head turns  5 cycles           Ther Activity                                       Gait Training                                       Modalities              prn to c spine   No charge   No charge

## 2022-10-17 ENCOUNTER — TELEPHONE (OUTPATIENT)
Dept: NEUROLOGY | Facility: CLINIC | Age: 62
End: 2022-10-17

## 2022-10-25 ENCOUNTER — OFFICE VISIT (OUTPATIENT)
Dept: NEUROLOGY | Facility: CLINIC | Age: 62
End: 2022-10-25
Payer: COMMERCIAL

## 2022-10-25 VITALS
HEART RATE: 80 BPM | WEIGHT: 264 LBS | DIASTOLIC BLOOD PRESSURE: 92 MMHG | SYSTOLIC BLOOD PRESSURE: 128 MMHG | BODY MASS INDEX: 43.93 KG/M2

## 2022-10-25 DIAGNOSIS — F41.9 ANXIETY DISORDER, UNSPECIFIED TYPE: ICD-10-CM

## 2022-10-25 DIAGNOSIS — G43.009 MIGRAINE WITHOUT AURA AND WITHOUT STATUS MIGRAINOSUS, NOT INTRACTABLE: Primary | ICD-10-CM

## 2022-10-25 DIAGNOSIS — H93.12 TINNITUS OF LEFT EAR: ICD-10-CM

## 2022-10-25 DIAGNOSIS — G47.33 OBSTRUCTIVE SLEEP APNEA (ADULT) (PEDIATRIC): ICD-10-CM

## 2022-10-25 PROCEDURE — 99215 OFFICE O/P EST HI 40 MIN: CPT | Performed by: STUDENT IN AN ORGANIZED HEALTH CARE EDUCATION/TRAINING PROGRAM

## 2022-10-25 RX ORDER — DULOXETIN HYDROCHLORIDE 30 MG/1
30 CAPSULE, DELAYED RELEASE ORAL DAILY
COMMUNITY

## 2022-10-25 RX ORDER — RIMEGEPANT SULFATE 75 MG/75MG
TABLET, ORALLY DISINTEGRATING ORAL
Qty: 8 TABLET | Refills: 3 | Status: SHIPPED | OUTPATIENT
Start: 2022-10-25

## 2022-10-25 NOTE — PROGRESS NOTES
[facility] Consult  PATIENT:  Kelly Jung  MRN:  [id number]  :  1960  DATE OF SERVICE:  10/25/2022  REFERRED BY: Ajith Burroughs PA-C  PMD: Liz Prieto MD    Assessment/Plan:     Kelly Jung is a delightful 65 y o  female with a past medical history that includes DM2, HTN, HLD, vertigo, obesity referred here for evaluation of headache  Initial evaluation 10/25/2022     Ms Claudean Siskin reports a history of headaches that have been ongoing since she was in her 25s  She had a period of time where she was mostly headache-free but since her COVID infection in May of this year she reports recurrence of headaches that started around July  At that same time she was also having ringing/motor sounds in her ear as well as periodic episodes of dizziness  She has been evaluated by ENT and PT of been treating her for suspected Menières  She was also evaluated in the hospital during an episode and there was concern for TIA versus migraine related symptoms  Based on her previous history and current description of her headaches I suspect that hospital visit was related to migraine and less likely TIA  She reports that she is doing well at the moment and has been continuing to improve especially over the last month  We decided that it would be best to continue amitriptyline 25 mg for the time being but I will also have her try Nurtec as an abortive for the more moderate to severe episodes  Avoiding triptans out of concern for this possible TIA recently  Finally, she is pending a sleep study next month which may be beneficial if she continues to have sleep apnea as she was previously diagnosed with for her bariatric surgery  Migraine without aura and without status migrainosus, not intractable  -     Rimegepant Sulfate (Nurtec) 75 MG TBDP; Take one NURTEC 75 mg at onset under tongue  Limit 1 in 24 hours  8 a month      Tinnitus of left ear  Anxiety disorder, unspecified type  Obstructive sleep apnea (adult) (pediatric)    Workup:  - Neurologic assessment reveals unremarkable neurological exam   - With no new or concerning symptoms, no red flags and an unremarkable neurologic exam, there is no specific indication for further evaluation with MRI brain  However, this could be obtained at any time if indicated  Preventative:  - we discussed headache hygiene and lifestyle factors that may improve headaches  - Continue Amitriptyline  - Currently on through other providers: Cymbalta, Lisinopril  - Past/ failed/contraindicated: None  - future options: CGRP med, botox    Acute:  - discussed not taking over-the-counter or prescription pain medications more than 3 days per week to prevent medication overuse/rebound headache  - Nurtec 75mg ODT  - Currently on through other providers: None  - Past/ failed/contraindicated: Avoid triptans for the time being out of concern for possible TIA  - future options: prochlorperazine, Toradol IM or p o , could consider trial of 5 days of Depakote 500 mg nightly or dexamethasone 2 mg daily for prolonged migraine, Blakekaitlynn Bellamyion  Patient instructions   Further testing  - Pending sleep study (please let me know the results)    Headache Calendar  Please maintain a headache calendar  Consider using phone applications such as Migraine Blu or [place] Migraine Tracker    Headache/migraine treatment:   Acute medications (for immediate treatment of a headache): It is ok to take ibuprofen, acetaminophen or naproxen (Advil, Tylenol,  Aleve, Excedrin) if they help your headaches you should limit these to No more than 2-3 times a week to avoid medication overuse/rebound headaches       For your more moderate to severe migraines take this medication early  Nurtec 75mg oral dissolving tablet     Prescription preventive medications for headaches/migraines   (to take every day to help prevent headaches - not to take at the time of headache):  [x] Continue Amitriptyline 25mg at bedtime    *Typically these types of medications take time until you see the benefit, although some may see improvement in days, often it may take weeks, especially if the medication is being titrated up to a beneficial level  Please contact us if there are any concerns or questions regarding the medication  Lifestyle Recommendations:  [x] SLEEP - Maintain a regular sleep schedule: Adults need at least 7-8 hours of uninterrupted a night  Maintain good sleep hygiene:  Going to bed and waking up at consistent times, avoiding excessive daytime naps, avoiding caffeinated beverages in the evening, avoid excessive stimulation in the evening and generally using bed primarily for sleeping  One hour before bedtime would recommend turning lights down lower, decreasing your activity (may read quietly, listen to music at a low volume)  When you get into bed, should eliminate all technology (no texting, emailing, playing with your phone, iPad or tablet in bed)  [x] HYDRATION - Maintain good hydration  Drink  2L of fluid a day (4 typical small water bottles)  [x] DIET - Maintain good nutrition  In particular don't skip meals and try and eat healthy balanced meals regularly  [x] TRIGGERS - Look for other triggers and avoid them: Limit caffeine to 1-2 cups a day or less  Avoid dietary triggers that you have noticed bring on your headaches (this could include aged cheese, peanuts, MSG, aspartame and nitrates)  [x] EXERCISE - physical exercise as we all know is good for you in many ways, and not only is good for your heart, but also is beneficial for your mental health, cognitive health and  chronic pain/headaches  I would encourage at the least 5 days of physical exercise weekly for at least 30 minutes  Education and Follow-up  [x] Please call with any questions or concerns  Of course if any new concerning symptoms go to the emergency department  [x] Follow up in 3 months  CC:    We had the pleasure of evaluating Jessica Victor in neurological consultation today  Jessica Victor is a   right handed female who presents today for evaluation of headaches  History obtained from patient as well as available medical record review  History of Present Illness:   Current medical illnesses  or past medical history include DM2, HTN, HLD, vertigo, obesity    Pertinent history:  - Seen previously by ENT with reports of normal/borderline SNHL  On Amitriptyline for headaches and ENT suspecting that her symptoms are migraine related  - Seen in hospital on 8/11/22  Presented with sudden onset left-sided facial numbness and tingling as well as vertigo and left sided tinnitus  Seen by neurology with thoughts of atypical migraine  Headaches started at what age? Around 22years old  How often do the headaches occur?   - as of 10/25/2022: 12/30 days  What time of the day do the headaches start? No particular time of day  How long do the headaches last? On and off the whole day or about 45 minutes  Are you ever headache free? Yes    Aura? without aura     Where is your headache located and pain quality? Right temple/parietal; aching pain  What is the intensity of pain? Worst 4-5/10, Average: 3-4/10  Associated symptoms:   [x] Stiff or sore neck   [x] Photophobia     [x]Phonophobia     [x] Prefer quiet, dark room  [x] Light-headed or dizzy     [x] Tinnitus     - headaches in 20's consisted of N/V, light and sound sensitivity    Things that make the headache worse? Any sort of activity    Headache triggers: stress    Have you seen someone else for headaches or pain? No  Have you had trigger point injection performed and how often? No  Have you had Botox injection performed and how often? No   Have you had epidural injections or transforaminal injections performed? Yes, epidural for pregnancy  Are you current pregnant or planning on getting pregnant? N/A  Have you ever had any Brain imaging?  Yes, MRI brain    Last eye exam: Yearly - normal    What medications do you take or have you taken for your headaches?    ABORTIVE:    OTC medications: Tylenol (once per week)  Prescription: None    Past/ failed/contraindicated:  OTC medications: None  Prescription: None    PREVENTIVE:   Amitriptyline 25mg HS (on this dose for about 1 month)  Cymbalta (for mood)  Lisinopril (HTN)    Past/ failed/contraindicated:  None    LIFESTYLE  Sleep   - averages: about 7 hours  Problems falling asleep?:   No  Problems staying asleep?:  No  - Positive history of snoring  - Pending sleep study next month    Physical activity: walks frequently    Water: 24oz per day  Caffeine: 2 cups of coffee per day    Mood:   History of anxiety  - Managed by PCP    The following portions of the patient's history were reviewed and updated as appropriate: allergies, current medications, past family history, past medical history, past social history, past surgical history and problem list     Pertinent family history:  Family history of headaches:  no known family members with significant headaches  Any family history of aneurysms - No    Pertinent social history:  Work: manages travel [employer]  Education: High school  Lives with     Illicit Drugs: denies  Alcohol/tobacco: Denies tobacco use, alcohol intake: glass of wine with dinner    Past Medical History:     Past Medical History:   Diagnosis Date   • Acid reflux    • CKD (chronic kidney disease), stage II    • Diabetes mellitus ([place] [address] )    • Dizziness    • Essential hypertension    • Hyperlipidemia    • Hypertension    • Type 2 diabetes mellitus ([facility] )        Patient Active Problem List   Diagnosis   • Type 2 diabetes mellitus ([place] [address] )   • Essential hypertension   • Mixed hyperlipidemia   • Type 2 diabetes mellitus with hyperglycemia, without long-term current use of insulin ([facility])   • Vertigo   • Tinnitus of left ear   • Numbness and tingling of left side of face   • Class 3 severe obesity in adult [facility])   • Thyroid nodule incidentally noted on imaging study       Medications:      Current Outpatient Medications   Medication Sig Dispense Refill   • amitriptyline (ELAVIL) 10 mg tablet Take 1 tablet (10 mg total) by mouth daily at bedtime (Patient taking differently: Take 25 mg by mouth daily at bedtime) 30 tablet 2   • Biotin 1000 MCG CHEW Chew 6,000 mcg     • Calcium 500 MG tablet Take by mouth     • Cholecalciferol 50 MCG (2000 UT) CAPS Take 500 Units by mouth     • Continuous Blood Gluc  (FREESTYLE FRANKI 14 DAY READER) SUZY by Does not apply route     • Continuous Blood Gluc Sensor (FreeStyle Franki 14 Day Sensor) MISC USE 1 DEVICE EVERY 14 (FOURTEEN) DAYS 6 each 1   • DULoxetine (CYMBALTA) 30 mg delayed release capsule Take 30 mg by mouth daily     • Farxiga 10 MG tablet TAKE 1 TABLET BY MOUTH EVERY DAY 30 tablet 5   • glipiZIDE (GLUCOTROL XL) 2 5 mg 24 hr tablet TAKE 1 TABLET BY MOUTH EVERY DAY 90 tablet 1   • lansoprazole (PREVACID) 15 mg capsule Take 15 mg by mouth daily     • lisinopril (ZESTRIL) 2 5 mg tablet Take 2 5 mg by mouth daily     • meclizine (ANTIVERT) 25 mg tablet Take 1 tablet (25 mg total) by mouth 3 (three) times a day as needed for dizziness 30 tablet 0   • metFORMIN (GLUCOPHAGE-XR) 500 mg 24 hr tablet TAKE 2 TABLETS BY MOUTH TWICE A DAY WITH FOOD 360 tablet 2   • Multiple Vitamins-Minerals (MULTIVITAMIN WITH MINERALS) tablet Take 1 tablet by mouth daily     • nystatin (MYCOSTATIN) cream Apply topically as needed     • ondansetron (ZOFRAN) 4 mg tablet Take 1 tablet (4 mg total) by mouth every 6 (six) hours 12 tablet 0   • Probiotic Product (ALIGN) 4 MG CAPS Take by mouth     • rosuvastatin (CRESTOR) 5 mg tablet Take 5 mg by mouth daily     • vitamin B-12 (VITAMIN B-12) 1,000 mcg tablet Take 5,000 mcg by mouth daily Takes 1 daily     • zolpidem (AMBIEN) 5 mg tablet Take 2 5 mg by mouth daily at bedtime as needed for sleep     • predniSONE 10 mg tablet Take 30 mg daily for 3 days   Then take 20 mg daily for 3 days  Then take 10 mg daily for 4 days  Then stop  (Patient not taking: No sig reported) 19 tablet 0     No current facility-administered medications for this visit  Allergies: Allergies   Allergen Reactions   • Ampicillin Hives   • Darvon [Propoxyphene] Vomiting   • Lipitor [Atorvastatin] Arthralgia   • Vioxx [Rofecoxib]        Family History:     Family History   Problem Relation Age of Onset   • Hypertension Mother    • Diabetes type II Father    • Diabetes Father    • Diabetes type II Paternal Grandmother    • Cancer Paternal Grandmother    • Breast cancer Paternal Grandmother 79   • Breast cancer Paternal Aunt 76       Social History:       Social History     Socioeconomic History   • Marital status: /Civil Union     Spouse name: Not on file   • Number of children: Not on file   • Years of education: Not on file   • Highest education level: Not on file   Occupational History   • Not on file   Tobacco Use   • Smoking status: Never Smoker   • Smokeless tobacco: Never Used   Vaping Use   • Vaping Use: Never used   Substance and Sexual Activity   • Alcohol use: Yes     Alcohol/week: 5 0 standard drinks     Types: 5 Glasses of wine per week     Comment: occ      • Drug use: Never   • Sexual activity: Not Currently     Partners: Male     Birth control/protection: None   Other Topics Concern   • Not on file   Social History Narrative   • Not on file     Social Determinants of Health     Financial Resource Strain: Not on file   Food Insecurity: Not on file   Transportation Needs: Not on file   Physical Activity: Not on file   Stress: Not on file   Social Connections: Not on file   Intimate Partner Violence: Not on file   Housing Stability: Not on file         Objective:   Physical Exam:                                                                 Vitals:            Constitutional:    /92 (BP Location: Left arm, Patient Position: Sitting, Cuff Size: Adult)   Pulse 80   Wt 120 kg (264 lb) BMI 43 93 kg/m²   BP Readings from Last 3 Encounters:   10/25/22 128/92   09/14/22 128/66   08/23/22 118/78     Pulse Readings from Last 3 Encounters:   10/25/22 80   09/14/22 99   08/23/22 70         Well developed, well nourished, well groomed  No dysmorphic features  HEENT:  Normocephalic atraumatic  Oropharynx is clear and moist  No oral mucosal lesions  Chest:  Respirations regular and unlabored  Cardiovascular:  Distal extremities warm without palpable edema or tenderness, no observed significant swelling  Musculoskeletal:  (see below under neurologic exam for evaluation of motor function and gait)   Skin:  warm and dry, not diaphoretic  No apparent birthmarks or stigmata of neurocutaneous disease  Psychiatric:  Normal behavior and appropriate affect       Neurological Examination:     Mental status/cognitive function:   Orientated to time, place and person  Recent and remote memory intact  Attention span and concentration as well as fund of knowledge are appropriate for age  Normal language and spontaneous speech  Cranial Nerves:  II-visual fields full  Fundi poorly visualized due to pupillary constriction  III, IV, VI-Pupils were equal, round, and reactive to light and accomodation  Extraocular movements were full and conjugate without nystagmus  Conjugate gaze, normal smooth pursuits, normal saccades  No abnormal eye movements noted on examination today  V-facial sensation symmetric  VII-facial expression symmetric, intact forehead wrinkle, strong eye closure, symmetric smile    VIII-hearing grossly intact bilaterally   IX, X-palate elevation symmetric, no dysarthria  XI-shoulder shrug strength intact    XII-tongue protrusion midline  Motor Exam: symmetric bulk and tone throughout, no pronator drift  Power/strength 5/5 bilateral upper and lower extremities, no atrophy, fasciculations or abnormal movements noted  Sensory: grossly intact light touch in all extremities  Reflexes: brachioradialis 2+, biceps 2+, knee 2+, ankle 2+ bilaterally  No ankle clonus  Coordination: Finger nose finger intact bilaterally, no apparent dysmetria, ataxia or tremor noted  Gait: steady casual and tandem gait  Pertinent lab results: None     Pertinent Imaging:   MRI brain without contrast 8/18/22: No acute pathology  Minimal chronic microangiopathy  MRI brain and IAC w/ and without 8/1/22: no acute findings  Negative IAC pathology  Minimal white matter disease    I have personally reviewed imaging and radiology read  Review of Systems:   Constitutional: Negative  Negative for appetite change and fever  HENT: Negative  Negative for hearing loss, tinnitus, trouble swallowing and voice change  Eyes: Positive for photophobia and visual disturbance  Negative for pain  Respiratory: Negative  Negative for shortness of breath  Cardiovascular: Negative  Negative for palpitations  Gastrointestinal: Negative  Negative for nausea and vomiting  Endocrine: Negative  Negative for cold intolerance  Genitourinary: Negative  Negative for dysuria, frequency and urgency  Musculoskeletal: Positive for neck stiffness (lower neck, upper back)  Negative for gait problem, myalgias and neck pain  Skin: Negative  Negative for rash  Allergic/Immunologic: Negative  Neurological: Positive for dizziness and headaches  Negative for tremors, seizures, syncope, facial asymmetry, speech difficulty, weakness, light-headedness and numbness  Hematological: Negative  Does not bruise/bleed easily  Psychiatric/Behavioral: Negative  Negative for confusion, hallucinations and sleep disturbance  All other systems reviewed and are negative  I have spent 45 minutes with the patient today in which greater than 50% of this time was spent in counseling/coordination of care regarding Prognosis, Patient and family education and Impressions   I also spent 15 minutes non face to face for this patient the same day       Activity Minutes   Precharting/reviewing 10   Patient care/counseling  45   Postcharting/care coordination 5       Author:  Raulito Franco 10/25/2022 11:18 AM

## 2022-10-25 NOTE — PROGRESS NOTES
Review of Systems   Constitutional: Negative  Negative for appetite change and fever  HENT: Negative  Negative for hearing loss, tinnitus, trouble swallowing and voice change  Eyes: Positive for photophobia and visual disturbance  Negative for pain  Respiratory: Negative  Negative for shortness of breath  Cardiovascular: Negative  Negative for palpitations  Gastrointestinal: Negative  Negative for nausea and vomiting  Endocrine: Negative  Negative for cold intolerance  Genitourinary: Negative  Negative for dysuria, frequency and urgency  Musculoskeletal: Positive for neck stiffness (lower neck, upper back)  Negative for gait problem, myalgias and neck pain  Skin: Negative  Negative for rash  Allergic/Immunologic: Negative  Neurological: Positive for dizziness and headaches  Negative for tremors, seizures, syncope, facial asymmetry, speech difficulty, weakness, light-headedness and numbness  Hematological: Negative  Does not bruise/bleed easily  Psychiatric/Behavioral: Negative  Negative for confusion, hallucinations and sleep disturbance  All other systems reviewed and are negative

## 2022-10-25 NOTE — PATIENT INSTRUCTIONS
Further testing  - Pending sleep study (please let me know the results)    Headache Calendar  Please maintain a headache calendar  Consider using phone applications such as Migraine Blu or [place] Migraine Tracker    Headache/migraine treatment:   Acute medications (for immediate treatment of a headache): It is ok to take ibuprofen, acetaminophen or naproxen (Advil, Tylenol,  Aleve, Excedrin) if they help your headaches you should limit these to No more than 2-3 times a week to avoid medication overuse/rebound headaches  For your more moderate to severe migraines take this medication early  Nurtec 75mg oral dissolving tablet     Prescription preventive medications for headaches/migraines   (to take every day to help prevent headaches - not to take at the time of headache):  [x] Continue Amitriptyline 25mg at bedtime    *Typically these types of medications take time until you see the benefit, although some may see improvement in days, often it may take weeks, especially if the medication is being titrated up to a beneficial level  Please contact us if there are any concerns or questions regarding the medication  Lifestyle Recommendations:  [x] SLEEP - Maintain a regular sleep schedule: Adults need at least 7-8 hours of uninterrupted a night  Maintain good sleep hygiene:  Going to bed and waking up at consistent times, avoiding excessive daytime naps, avoiding caffeinated beverages in the evening, avoid excessive stimulation in the evening and generally using bed primarily for sleeping  One hour before bedtime would recommend turning lights down lower, decreasing your activity (may read quietly, listen to music at a low volume)  When you get into bed, should eliminate all technology (no texting, emailing, playing with your phone, iPad or tablet in bed)  [x] HYDRATION - Maintain good hydration  Drink  2L of fluid a day (4 typical small water bottles)  [x] DIET - Maintain good nutrition   In particular don't skip meals and try and eat healthy balanced meals regularly  [x] TRIGGERS - Look for other triggers and avoid them: Limit caffeine to 1-2 cups a day or less  Avoid dietary triggers that you have noticed bring on your headaches (this could include aged cheese, peanuts, MSG, aspartame and nitrates)  [x] EXERCISE - physical exercise as we all know is good for you in many ways, and not only is good for your heart, but also is beneficial for your mental health, cognitive health and  chronic pain/headaches  I would encourage at the least 5 days of physical exercise weekly for at least 30 minutes  Education and Follow-up  [x] Please call with any questions or concerns  Of course if any new concerning symptoms go to the emergency department    [x] Follow up in 3 months

## 2022-11-15 ENCOUNTER — OFFICE VISIT (OUTPATIENT)
Dept: SLEEP CENTER | Facility: CLINIC | Age: 62
End: 2022-11-15

## 2022-11-15 VITALS
HEART RATE: 96 BPM | HEIGHT: 65 IN | BODY MASS INDEX: 44.15 KG/M2 | DIASTOLIC BLOOD PRESSURE: 84 MMHG | SYSTOLIC BLOOD PRESSURE: 132 MMHG | WEIGHT: 265 LBS | OXYGEN SATURATION: 97 %

## 2022-11-15 DIAGNOSIS — G47.00 INSOMNIA, UNSPECIFIED TYPE: ICD-10-CM

## 2022-11-15 DIAGNOSIS — G47.33 OBSTRUCTIVE SLEEP APNEA-HYPOPNEA SYNDROME: Primary | ICD-10-CM

## 2022-11-15 DIAGNOSIS — R51.9 HEADACHE: ICD-10-CM

## 2022-11-15 RX ORDER — NYSTATIN 100000 [USP'U]/G
POWDER TOPICAL
COMMUNITY
Start: 2022-11-14

## 2022-11-15 RX ORDER — FLUCONAZOLE 200 MG/1
TABLET ORAL
COMMUNITY
Start: 2022-11-14

## 2022-11-15 NOTE — PROGRESS NOTES
Consultation - [address], 1960, MRN: [id number]    11/15/2022        Reason for Consult / Principal Problem:    Evaluation of possible Obstructive Sleep Apnea  Insomnia       Thank you for the opportunity of participating in the evaluation and care of this patient in the Sleep Clinic at [facility]  Subjective:     HPI: Cristian Bonner is a 65y o  year old female  She presents for a consultation regarding concern of possible obstructive sleep apnea  She developed a form of tinnitus with vertigo in mid July  There was concern she may have had a TIA  She was evaluated by neurology and ENT  A cause for the symptoms has not been determined  She began use of amitriptyline at bedtime  She completed vestibular PT  Symptoms resolved completely in mid September  She also takes ambien 5mg at bedtime for insomnia  She states that she had difficulty staying asleep when going through a divorce  She began use of ambien and continues to take it daily at bedtime  Her current symptoms include intermittent snoring, some night time awakenings (which have improved since taking ambien)  She states that she completed a sleep study in the past, prior to gastric bypass surgery  She was asymptomatic at the time of testing, aside from intermittent snoring  Weight loss was considered treatment  She was not retested after weight loss  Comorbid conditions:  Obesity  HTN  Type 2 diabetes  Review of Systems      Genitourinary none   Cardiology none   Gastrointestinal none   Neurology none   Constitutional none   Integumentary none   Psychiatry none   Musculoskeletal none   Pulmonary none   ENT ringing in ears   Endocrine none   Hematological none     Sleep Study Results:  She had a sleep study at [facility] approximately 7 years ago, prior to gastic bypass surgery  She reports that study indicated mild sleep apnea  She has since lost 60 lbs  Results of testing are not available  Employment:  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  She works from home between the hours of 7:30am-4:30pm - (40-50 hours per week)    Sleep Schedule:       Bedtime:  She gets into bed by 9:00pm, takes ambien 5mg and amitriptyline at 9:00pm      Latency:  30-60 minutes      Wakeup time:  5:30am with the use of an alarm, she typically wakes up before the alarm goes off  She feels fairly refreshed upon awakening  Awakenings:       Frequency:  One time per night or less      Causes:  Dog, repositioning      Duration:  Returns to sleep within a few minutes, without difficulty    Daytime Sleepiness / Inappropriate Sleep:       Most severe:  She does not feel sleepy during the day       Naps :  She does not take naps      Inappropriate drowsiness / sleep:  She dozes at times while watching TV in the evening    Snoring:  She snores intermittently     Apnea: No witnessed apnea    Change in Weight:  She has regained 45 lbs from her lowest weight of 220lbs but states that she has been at this weight for a few years  Restless Leg Syndrome:  No current clinical symptoms consistent with this diagnosis  She has noticed some restless legs symptoms at times in the past, especially when on an airplane  Other Complaints:   No reports of sleep walking, sleep talking, sleep paralysis or hallucinations surrounding sleep  She was waking up with headaches prior to use of amitriptyline, but headaches could occur any time during the day  Since taking this medication, she has not had any headaches  She reports bruxism and feels that she has been clenching during sleep more often lately, having some jaw pain upon awakening  She notices she also clenches during the daytime hours, when having increased stress       Social History:      Caffeine:  One cup of coffee daily in the am       Tobacco:   reports that she has never smoked  She has never used smokeless tobacco      E-cig/Vaping:    E-Cigarette/Vaping   • E-Cigarette Use Never User       E-Cigarette/Vaping Substances         Alcohol:   reports current alcohol use of about 5 0 standard drinks of alcohol per week  Wine a few times per week      Drugs:   reports no history of drug use  The review of systems and following portions of the patient's history were reviewed and updated as appropriate: allergies, current medications, past family history, past medical history, past social history, past surgical history and problem list         Objective:       Vitals:    11/15/22 1300   BP: 132/84   Pulse: 96   SpO2: 97%   Weight: 120 kg (265 lb)   Height: 5' 5" (1 651 m)     Body mass index is 44 1 kg/m²  Neck Circumference: 18 5  Freeman Sleepiness Scale:  Total score: 5      Current Outpatient Medications:   •  amitriptyline (ELAVIL) 25 mg tablet, Take 1 tablet (25 mg total) by mouth daily at bedtime, Disp: 30 tablet, Rfl: 3  •  Biotin 1000 MCG CHEW, Chew 6,000 mcg, Disp: , Rfl:   •  Calcium 500 MG tablet, Take by mouth, Disp: , Rfl:   •  Cholecalciferol 50 MCG (2000 UT) CAPS, Take 500 Units by mouth, Disp: , Rfl:   •  Continuous Blood Gluc  (FREESTYLE FRANKI 14 DAY READER) SUZY, by Does not apply route, Disp: , Rfl:   •  Continuous Blood Gluc Sensor (FreeStyle Franki 14 Day Sensor) MISC, USE 1 DEVICE EVERY 14 (FOURTEEN) DAYS, Disp: 6 each, Rfl: 1  •  DULoxetine (CYMBALTA) 30 mg delayed release capsule, Take 30 mg by mouth daily, Disp: , Rfl:   •  Farxiga 10 MG tablet, TAKE 1 TABLET BY MOUTH EVERY DAY, Disp: 30 tablet, Rfl: 5  •  fluconazole (DIFLUCAN) 200 mg tablet, , Disp: , Rfl:   •  glipiZIDE (GLUCOTROL XL) 2 5 mg 24 hr tablet, TAKE 1 TABLET BY MOUTH EVERY DAY, Disp: 90 tablet, Rfl: 1  •  lansoprazole (PREVACID) 15 mg capsule, Take 15 mg by mouth daily, Disp: , Rfl:   •  lisinopril (ZESTRIL) 2 5 mg tablet, Take 2 5 mg by mouth daily, Disp: , Rfl:   •  meclizine (ANTIVERT) 25 mg tablet, Take 1 tablet (25 mg total) by mouth 3 (three) times a day as needed for dizziness, Disp: 30 tablet, Rfl: 0  •  metFORMIN (GLUCOPHAGE-XR) 500 mg 24 hr tablet, TAKE 2 TABLETS BY MOUTH TWICE A DAY WITH FOOD, Disp: 360 tablet, Rfl: 2  •  Multiple Vitamins-Minerals (MULTIVITAMIN WITH MINERALS) tablet, Take 1 tablet by mouth daily, Disp: , Rfl:   •  nystatin (MYCOSTATIN) cream, Apply topically as needed, Disp: , Rfl:   •  nystatin (MYCOSTATIN) powder, , Disp: , Rfl:   •  ondansetron (ZOFRAN) 4 mg tablet, Take 1 tablet (4 mg total) by mouth every 6 (six) hours, Disp: 12 tablet, Rfl: 0  •  Probiotic Product (ALIGN) 4 MG CAPS, Take by mouth, Disp: , Rfl:   •  Rimegepant Sulfate (Nurtec) 75 MG TBDP, Take one NURTEC 75 mg at onset under tongue  Limit 1 in 24 hours  8 a month , Disp: 8 tablet, Rfl: 3  •  rosuvastatin (CRESTOR) 5 mg tablet, Take 5 mg by mouth daily, Disp: , Rfl:   •  vitamin B-12 (VITAMIN B-12) 1,000 mcg tablet, Take 5,000 mcg by mouth daily Takes 1 daily, Disp: , Rfl:   •  zolpidem (AMBIEN) 5 mg tablet, Take 2 5 mg by mouth daily at bedtime as needed for sleep, Disp: , Rfl:   •  predniSONE 10 mg tablet, Take 30 mg daily for 3 days  Then take 20 mg daily for 3 days  Then take 10 mg daily for 4 days  Then stop   (Patient not taking: No sig reported), Disp: 19 tablet, Rfl: 0    Physical Exam  General Appearance:   Alert, cooperative, no distress, appears stated age, obese     Head:   Normocephalic, without obvious abnormality, atraumatic     Eyes:   PERRL, conjunctiva/corneas clear          Nose:  Nares normal, septum midline, mucosa normal, no drainage or sinus tenderness           Throat:  Lips, teeth and gums normal; tongue normal in size and midline in position; mucosa moist with low-lying soft palatal tissue, uvula thick and only partially visualized, tonsils not visualized, Mallampati class 4       Neck:  Supple, short and thick, symmetrical, trachea midline, no adenopathy; no thyromegaly noted, no carotid bruit or JVD     Lungs:      Clear to auscultation bilaterally, respirations unlabored     Heart:   Regular rate and rhythm, S1 and S2 normal, no murmur, rub or gallop       Extremities:  Extremities normal, atraumatic, no cyanosis or edema       Skin:  Skin color, texture, turgor normal, no rashes or lesions       Neurologic:  No focal deficits noted  ASSESSMENT / PLAN     1  Obstructive sleep apnea-hypopnea syndrome  Home Study   2  Headache  Ambulatory Referral to Sleep Medicine         Counseling / Coordination of Care  Total clinic time spent today 55 minutes  Greater than 50% of total time was spent with the patient and / or family counseling and / or coordination of care  A description of the counseling / coordination of care:     diagnostic results, instructions for management, risk factor reductions, prognosis, patient and family education, impressions, risks and benefits of treatment options and importance of compliance with treatment    Today we discussed the anatomy and physiology of the upper airway  I pointed out how changes in this region can result in both snoring and abnormal breathing events including apneas and hypopneas  I explained the most common co-morbidities of untreated sleep apnea  After this we talked about some forms of treatment including application of positive airway pressure, mandibular advancement devices and surgery  She would consider treatment with CPAP if testing confirms LLOYD  In order to evaluate the possibility of Obstructive Sleep Apnea as a cause of the patient's symptoms, a home sleep study will be completed to identify the presence or absence of abnormal nocturnal breathing  If significant abnormal nocturnal breathing is detected, nasal CPAP will be titrated to find the optimum pressure needed to maintain upper airway patency during sleep  This may be accomplished using APAP or may require an in lab titration study  Following testing, the patient will return to the Sleep [address] [place] [address] for set up of CPAP equipment with follow up visit to review compliance and effectiveness of treatment 31-91 days after set up  The following instructions have been given to the patient today:    Patient Instructions   1  Schedule home sleep study  2  Schedule set up of CPAP equipment  3  Schedule compliance visit 31-91 days after beginning use of equipment       Nursing Support:  When: Monday through Friday 7A-5PM except holidays  Where: Our direct line is [phone]  If you are having a true emergency please call 911  In the event that the line is busy or it is after hours please leave a voice message and we will return your call  Please speak clearly, leaving your full name, birth date, best number to reach you and the reason for your call  Medication refills: We will need the name of the medication, the dosage, the ordering provider, whether you get a 30 or 90 day refill, and the pharmacy name and address  Medications will be ordered by the provider only  Nurses cannot call in prescriptions  Please allow 7 days for medication refills  Physician requested updates: If your provider requested that you call with an update after starting medication, please be ready to provide us the medication and dosage, what time you take your medication, the time you attempt to fall asleep, time you fall asleep, when you wake up, and what time you get out of bed  Sleep Study Results: We will contact you with sleep study results and/or next steps after the physician has reviewed your testing  Luis [address]      Portions of the record may have been created with voice recognition software  Occasional wrong word or "sound a like" substitutions may have occurred due to the inherent limitations of voice recognition software   Read the chart carefully and recognize, using context, where substitutions have occurred

## 2022-11-15 NOTE — PATIENT INSTRUCTIONS
Schedule home sleep study  Schedule set up of CPAP equipment  Schedule compliance visit 31-91 days after beginning use of equipment       Nursing Support:  When: Monday through Friday 7A-5PM except holidays  Where: Our direct line is [phone]  If you are having a true emergency please call 911  In the event that the line is busy or it is after hours please leave a voice message and we will return your call  Please speak clearly, leaving your full name, birth date, best number to reach you and the reason for your call  Medication refills: We will need the name of the medication, the dosage, the ordering provider, whether you get a 30 or 90 day refill, and the pharmacy name and address  Medications will be ordered by the provider only  Nurses cannot call in prescriptions  Please allow 7 days for medication refills  Physician requested updates: If your provider requested that you call with an update after starting medication, please be ready to provide us the medication and dosage, what time you take your medication, the time you attempt to fall asleep, time you fall asleep, when you wake up, and what time you get out of bed  Sleep Study Results: We will contact you with sleep study results and/or next steps after the physician has reviewed your testing

## 2023-01-19 LAB
CREAT ?TM UR-SCNC: 47.7 UMOL/L
EXT MICROALBUMIN URINE RANDOM: 12
HBA1C MFR BLD HPLC: 7.9 %
MICROALBUMIN/CREAT UR: 25.2 MG/G{CREAT}

## 2023-01-20 ENCOUNTER — TELEPHONE (OUTPATIENT)
Dept: NEUROLOGY | Facility: CLINIC | Age: 63
End: 2023-01-20

## 2023-02-02 ENCOUNTER — OFFICE VISIT (OUTPATIENT)
Dept: NEUROLOGY | Facility: CLINIC | Age: 63
End: 2023-02-02

## 2023-02-02 VITALS
DIASTOLIC BLOOD PRESSURE: 76 MMHG | TEMPERATURE: 97.3 F | HEIGHT: 65 IN | SYSTOLIC BLOOD PRESSURE: 130 MMHG | HEART RATE: 68 BPM | WEIGHT: 268.1 LBS | BODY MASS INDEX: 44.67 KG/M2 | OXYGEN SATURATION: 99 %

## 2023-02-02 DIAGNOSIS — F41.9 ANXIETY DISORDER, UNSPECIFIED TYPE: ICD-10-CM

## 2023-02-02 DIAGNOSIS — G47.33 OBSTRUCTIVE SLEEP APNEA (ADULT) (PEDIATRIC): ICD-10-CM

## 2023-02-02 DIAGNOSIS — G43.009 MIGRAINE WITHOUT AURA AND WITHOUT STATUS MIGRAINOSUS, NOT INTRACTABLE: Primary | ICD-10-CM

## 2023-02-02 NOTE — PROGRESS NOTES
[address] [facility] Consult - Follow up   PATIENT:  Monika Atkinson  MRN:  [id number]  :  1960  DATE OF SERVICE:  2023  REFERRED BY: No ref  provider found  PMD: Ana Barr MD    Assessment/Plan:   Monika Atkinson is a delightful 65 y o  female with a past medical history that includes DM2, HTN, HLD, vertigo, obesity here for f/u evaluation of headache  Since her last visit, she reports that she has been doing fairly well  Her headaches are not as intense as they used to be, and the frequency has slightly decreased as well  She continues to take amitriptyline 25 mg at bedtime, but endorses some daytime fatigue as a side effect  Per patient, ENT would like to keep her on that for a total of 6 months and then work on weaning her off of it  They recommended magnesium supplements and I have also recommended adding on B2 supplements as well  She is pending her sleep study in the next month and will keep me updated on the results  Workup:  -Home sleep study pending  -MRI brain without contrast 2022: No acute intracranial findings    White matter changes      Preventative:  - we discussed headache hygiene and lifestyle factors that may improve headaches  - Continue Amitriptyline  - Add on Mg and B2 supplements  - Currently on through other providers: Cymbalta, Lisinopril  - Past/ failed/contraindicated: None  - future options: CGRP med, botox     Acute:  - discussed not taking over-the-counter or prescription pain medications more than 3 days per week to prevent medication overuse/rebound headache  - Nurtec 75mg ODT  - Currently on through other providers: None  - Past/ failed/contraindicated: Avoid triptans for the time being out of concern for possible TIA  - future options: prochlorperazine, Toradol IM or p o , could consider trial of 5 days of Depakote 500 mg nightly or dexamethasone 2 mg daily for prolonged migraine,  [place]  Patient instructions   Further testing  - Pending sleep study     Headache Calendar  Please maintain a headache calendar  Consider using phone applications such as Migraine Blu or [employer] Migraine Tracker     Headache/migraine treatment:   Acute medications (for immediate treatment of a headache): It is ok to take ibuprofen, acetaminophen or naproxen (Advil, Tylenol,  Aleve, Excedrin) if they help your headaches you should limit these to No more than 2-3 times a week to avoid medication overuse/rebound headaches       For your more moderate to severe migraines take this medication early  - Nurtec 75mg oral dissolving tablet      Prescription preventive medications for headaches/migraines   (to take every day to help prevent headaches - not to take at the time of headache):  [x]? Continue Amitriptyline 25mg at bedtime     *Typically these types of medications take time until you see the benefit, although some may see improvement in days, often it may take weeks, especially if the medication is being titrated up to a beneficial level  Please contact us if there are any concerns or questions regarding the medication       Over the counter preventive supplements for headaches/migraines (if you try, try for 3 months straight)  (to take every day to help prevent headaches - not to take at the time of headache):  [x] Magnesium 400mg daily (If any diarrhea or upset stomach, decrease dose  as tolerated) - oxide or glycinate  [x] Riboflavin (Vitamin B2) 400mg daily - (FYI B2 may make your urine bright/neon yellow)    Lifestyle Recommendations:  [x]? SLEEP - Maintain a regular sleep schedule: Adults need at least 7-8 hours of uninterrupted a night  Maintain good sleep hygiene:  Going to bed and waking up at consistent times, avoiding excessive daytime naps, avoiding caffeinated beverages in the evening, avoid excessive stimulation in the evening and generally using bed primarily for sleeping    One hour before bedtime would recommend turning lights down lower, decreasing your activity (may read quietly, listen to music at a low volume)  When you get into bed, should eliminate all technology (no texting, emailing, playing with your phone, iPad or tablet in bed)  [x]? HYDRATION - Maintain good hydration  Drink  2L of fluid a day (4 typical small water bottles)  [x]? DIET - Maintain good nutrition  In particular don't skip meals and try and eat healthy balanced meals regularly  [x]? TRIGGERS - Look for other triggers and avoid them: Limit caffeine to 1-2 cups a day or less  Avoid dietary triggers that you have noticed bring on your headaches (this could include aged cheese, peanuts, MSG, aspartame and nitrates)  [x]? EXERCISE - physical exercise as we all know is good for you in many ways, and not only is good for your heart, but also is beneficial for your mental health, cognitive health and  chronic pain/headaches  I would encourage at the least 5 days of physical exercise weekly for at least 30 minutes       Education and Follow-up  [x]? Please call with any questions or concerns  Of course if any new concerning symptoms go to the emergency department  [x]? Follow up in 6 months  Subjective:   2/2/23: Since her last visit, she reports no significant improvement in terms of her headaches  Currently experiencing about 8 headache days per month  Reports that they don't last as long  Continuing to take Amitriptyline 25mg daily, but notes some ongoing grogginess with it in the morning  She saw her ENT recently who recommended that she stay on it for at least 6 months  They recommended that she started Mg supplements  Nurtec works well for her, but has only taken it once  Previous History:  10/25/22: Ms Margaret Francis reports a history of headaches that have been ongoing since she was in her 25s  She had a period of time where she was mostly headache-free but since her COVID infection in May of this year she reports recurrence of headaches that started around July  At that same time she was also having ringing/motor sounds in her ear as well as periodic episodes of dizziness  She has been evaluated by ENT and PT of been treating her for suspected Menières  She was also evaluated in the hospital during an episode and there was concern for TIA versus migraine related symptoms  Based on her previous history and current description of her headaches I suspect that hospital visit was related to migraine and less likely TIA  She reports that she is doing well at the moment and has been continuing to improve especially over the last month  We decided that it would be best to continue amitriptyline 25 mg for the time being but I will also have her try Nurtec as an abortive for the more moderate to severe episodes  Avoiding triptans out of concern for this possible TIA recently  Finally, she is pending a sleep study next month which may be beneficial if she continues to have sleep apnea as she was previously diagnosed with for her bariatric surgery     Past Medical History:     Past Medical History:   Diagnosis Date   • Acid reflux    • CKD (chronic kidney disease), stage II    • Diabetes mellitus ([place] [address] )    • Dizziness    • Essential hypertension    • Hyperlipidemia    • Hypertension    • Type 2 diabetes mellitus ([place] [address] )        Patient Active Problem List   Diagnosis   • Type 2 diabetes mellitus ([place] [address] )   • Essential hypertension   • Mixed hyperlipidemia   • Type 2 diabetes mellitus with hyperglycemia, without long-term current use of insulin ([facility])   • Vertigo   • Tinnitus of left ear   • Numbness and tingling of left side of face   • Class 3 severe obesity in adult [facility])   • Thyroid nodule incidentally noted on imaging study   • Obstructive sleep apnea-hypopnea syndrome   • Headache       Medications:      Current Outpatient Medications   Medication Sig Dispense Refill   • amitriptyline (ELAVIL) 25 mg tablet Take 1 tablet (25 mg total) by mouth daily at bedtime 30 tablet 3   • Biotin 1000 MCG CHEW Chew 6,000 mcg     • Calcium 500 MG tablet Take by mouth     • Cholecalciferol 50 MCG (2000 UT) CAPS Take 500 Units by mouth     • Continuous Blood Gluc  (FREESTYLE FRANKI 14 DAY READER) SUZY by Does not apply route     • Continuous Blood Gluc Sensor (FreeStyle Franki 14 Day Sensor) MISC USE 1 DEVICE EVERY 14 (FOURTEEN) DAYS 6 each 1   • DULoxetine (CYMBALTA) 30 mg delayed release capsule Take 30 mg by mouth daily     • Farxiga 10 MG tablet TAKE 1 TABLET BY MOUTH EVERY DAY 30 tablet 5   • glipiZIDE (GLUCOTROL XL) 2 5 mg 24 hr tablet TAKE 1 TABLET BY MOUTH EVERY DAY 90 tablet 1   • lansoprazole (PREVACID) 15 mg capsule Take 15 mg by mouth daily     • lisinopril (ZESTRIL) 2 5 mg tablet Take 2 5 mg by mouth daily     • meclizine (ANTIVERT) 25 mg tablet Take 1 tablet (25 mg total) by mouth 3 (three) times a day as needed for dizziness 30 tablet 0   • metFORMIN (GLUCOPHAGE-XR) 500 mg 24 hr tablet TAKE 2 TABLETS BY MOUTH TWICE A DAY WITH FOOD 360 tablet 2   • Multiple Vitamins-Minerals (MULTIVITAMIN WITH MINERALS) tablet Take 1 tablet by mouth daily     • nystatin (MYCOSTATIN) cream Apply topically as needed     • nystatin (MYCOSTATIN) powder      • Probiotic Product (ALIGN) 4 MG CAPS Take by mouth     • Rimegepant Sulfate (Nurtec) 75 MG TBDP Take one NURTEC 75 mg at onset under tongue  Limit 1 in 24 hours  8 a month  8 tablet 3   • rosuvastatin (CRESTOR) 5 mg tablet Take 5 mg by mouth daily     • zolpidem (AMBIEN) 5 mg tablet Take 2 5 mg by mouth daily at bedtime as needed for sleep     • fluconazole (DIFLUCAN) 200 mg tablet      • ondansetron (ZOFRAN) 4 mg tablet Take 1 tablet (4 mg total) by mouth every 6 (six) hours (Patient not taking: Reported on 2/2/2023) 12 tablet 0   • predniSONE 10 mg tablet Take 30 mg daily for 3 days  Then take 20 mg daily for 3 days  Then take 10 mg daily for 4 days  Then stop   (Patient not taking: Reported on 9/14/2022) 19 tablet 0   • vitamin B-12 (VITAMIN B-12) 1,000 mcg tablet Take 5,000 mcg by mouth daily Takes 1 daily (Patient not taking: Reported on 2/2/2023)       No current facility-administered medications for this visit  Allergies: Allergies   Allergen Reactions   • Ampicillin Hives   • Darvon [Propoxyphene] Vomiting   • Lipitor [Atorvastatin] Arthralgia   • Vioxx [Rofecoxib]        Family History:     Family History   Problem Relation Age of Onset   • Hypertension Mother    • Diabetes type II Father    • Diabetes Father    • Diabetes type II Paternal Grandmother    • Cancer Paternal Grandmother    • Breast cancer Paternal Grandmother 79   • Breast cancer Paternal Aunt 76       Social History:     Social History     Socioeconomic History   • Marital status: /Civil Union     Spouse name: Not on file   • Number of children: Not on file   • Years of education: Not on file   • Highest education level: Not on file   Occupational History   • Not on file   Tobacco Use   • Smoking status: Never   • Smokeless tobacco: Never   Vaping Use   • Vaping Use: Never used   Substance and Sexual Activity   • Alcohol use: Yes     Alcohol/week: 5 0 standard drinks     Types: 5 Glasses of wine per week     Comment: occ      • Drug use: Never   • Sexual activity: Not Currently     Partners: Male     Birth control/protection: None   Other Topics Concern   • Not on file   Social History Narrative   • Not on file     Social Determinants of Health     Financial Resource Strain: Not on file   Food Insecurity: Not on file   Transportation Needs: Not on file   Physical Activity: Not on file   Stress: Not on file   Social Connections: Not on file   Intimate Partner Violence: Not on file   Housing Stability: Not on file         Objective:   Physical Exam:                                                                 Vitals:            Constitutional:    /76 (BP Location: Left arm, Patient Position: Sitting, Cuff Size: Standard)   Pulse 68   Temp (!) 97 3 °F (36 3 °C) (Temporal)   Ht 5' 5" (1 651 m)   Wt 122 kg (268 lb 1 6 oz)   SpO2 99%   BMI 44 61 kg/m²   BP Readings from Last 3 Encounters:   02/02/23 130/76   11/15/22 132/84   10/25/22 128/92     Pulse Readings from Last 3 Encounters:   02/02/23 68   11/15/22 96   10/25/22 80         Well developed, well nourished, well groomed  No dysmorphic features  HEENT:  Normocephalic atraumatic  See neuro exam   Chest:  Respirations appear regular and unlabored  Cardiovascular:  no observed significant swelling  Musculoskeletal:  (see below under neurologic exam for evaluation of motor function and gait)   Skin:  warm and dry, not diaphoretic  Psychiatric:  Normal behavior and appropriate affect       Neurological Examination:     Mental status/cognitive function:   Recent and remote memory intact  Attention span and concentration as well as fund of knowledge are appropriate for age  Normal language and spontaneous speech  Cranial Nerves:  III, IV, VI-Pupils were equal, round  Extraocular movements were full and conjugate   VII-facial expression symmetric  VIII-hearing grossly intact bilaterally   Motor Exam: symmetric bulk throughout  no atrophy, fasciculations or abnormal movements noted  Coordination:  no apparent dysmetria, ataxia or tremor noted  Gait: steady casual gait  Review of Systems:   Constitutional: Negative  Negative for appetite change and fever  HENT: Negative  Negative for hearing loss, tinnitus, trouble swallowing and voice change  Eyes: Positive for photophobia  Negative for pain and visual disturbance  Respiratory: Negative  Negative for shortness of breath  Cardiovascular: Negative  Negative for palpitations  Gastrointestinal: Negative  Negative for nausea and vomiting  Endocrine: Negative  Negative for cold intolerance  Genitourinary: Negative  Negative for dysuria, frequency and urgency  Musculoskeletal: Negative  Negative for gait problem, myalgias and neck pain  Skin: Negative  Negative for rash  Allergic/Immunologic: Negative  Neurological: Positive for headaches  Negative for dizziness, tremors, seizures, syncope, facial asymmetry, speech difficulty, weakness, light-headedness and numbness  Hematological: Negative  Does not bruise/bleed easily  Psychiatric/Behavioral: Negative  Negative for confusion, hallucinations and sleep disturbance  All other systems reviewed and are negative  I have spent 15 minutes with Patient  today in which greater than 50% of this time was spent in counseling/coordination of care regarding Prognosis, Risks and benefits of tx options, Patient and family education and Impressions  I also spent 10 minutes non face to face for this patient the same day       Activity Minutes   Precharting/reviewing 5   Patient care/counseling 15   Postcharting/care coordination 5       Author:  Dyana Soares DO 2/2/2023 4:04 PM

## 2023-02-02 NOTE — PATIENT INSTRUCTIONS
Further testing  - Pending sleep study     Headache Calendar  Please maintain a headache calendar  Consider using phone applications such as Migraine Blu or [facility] Migraine Tracker     Headache/migraine treatment:   Acute medications (for immediate treatment of a headache): It is ok to take ibuprofen, acetaminophen or naproxen (Advil, Tylenol,  Aleve, Excedrin) if they help your headaches you should limit these to No more than 2-3 times a week to avoid medication overuse/rebound headaches  For your more moderate to severe migraines take this medication early  - Nurtec 75mg oral dissolving tablet      Prescription preventive medications for headaches/migraines   (to take every day to help prevent headaches - not to take at the time of headache):  [x] Continue Amitriptyline 25mg at bedtime     *Typically these types of medications take time until you see the benefit, although some may see improvement in days, often it may take weeks, especially if the medication is being titrated up to a beneficial level  Please contact us if there are any concerns or questions regarding the medication  Over the counter preventive supplements for headaches/migraines (if you try, try for 3 months straight)  (to take every day to help prevent headaches - not to take at the time of headache):  [x] Magnesium 400mg daily (If any diarrhea or upset stomach, decrease dose  as tolerated) - oxide or glycinate  [x] Riboflavin (Vitamin B2) 400mg daily - (FYI B2 may make your urine bright/neon yellow)    Lifestyle Recommendations:  [x] SLEEP - Maintain a regular sleep schedule: Adults need at least 7-8 hours of uninterrupted a night  Maintain good sleep hygiene:  Going to bed and waking up at consistent times, avoiding excessive daytime naps, avoiding caffeinated beverages in the evening, avoid excessive stimulation in the evening and generally using bed primarily for sleeping    One hour before bedtime would recommend turning lights down lower, decreasing your activity (may read quietly, listen to music at a low volume)  When you get into bed, should eliminate all technology (no texting, emailing, playing with your phone, iPad or tablet in bed)  [x] HYDRATION - Maintain good hydration  Drink  2L of fluid a day (4 typical small water bottles)  [x] DIET - Maintain good nutrition  In particular don't skip meals and try and eat healthy balanced meals regularly  [x] TRIGGERS - Look for other triggers and avoid them: Limit caffeine to 1-2 cups a day or less  Avoid dietary triggers that you have noticed bring on your headaches (this could include aged cheese, peanuts, MSG, aspartame and nitrates)  [x] EXERCISE - physical exercise as we all know is good for you in many ways, and not only is good for your heart, but also is beneficial for your mental health, cognitive health and  chronic pain/headaches  I would encourage at the least 5 days of physical exercise weekly for at least 30 minutes  Education and Follow-up  [x] Please call with any questions or concerns  Of course if any new concerning symptoms go to the emergency department    [x] Follow up in 6 months

## 2023-02-02 NOTE — PROGRESS NOTES
Review of Systems   Constitutional: Negative  Negative for appetite change and fever  HENT: Negative  Negative for hearing loss, tinnitus, trouble swallowing and voice change  Eyes: Positive for photophobia  Negative for pain and visual disturbance  Respiratory: Negative  Negative for shortness of breath  Cardiovascular: Negative  Negative for palpitations  Gastrointestinal: Negative  Negative for nausea and vomiting  Endocrine: Negative  Negative for cold intolerance  Genitourinary: Negative  Negative for dysuria, frequency and urgency  Musculoskeletal: Negative  Negative for gait problem, myalgias and neck pain  Skin: Negative  Negative for rash  Allergic/Immunologic: Negative  Neurological: Positive for headaches  Negative for dizziness, tremors, seizures, syncope, facial asymmetry, speech difficulty, weakness, light-headedness and numbness  Hematological: Negative  Does not bruise/bleed easily  Psychiatric/Behavioral: Negative  Negative for confusion, hallucinations and sleep disturbance  All other systems reviewed and are negative  Since your last visit are your headaches same    Any change to the headache type? no    What is your current headache frequency: 1-2 per week    Are you taking your current medications as prescribed? yes    If no, why not? Do you have any side effects? no    How may days per week do you take an abortive medicine?  2

## 2023-02-16 DIAGNOSIS — E11.65 TYPE 2 DIABETES MELLITUS WITH HYPERGLYCEMIA, WITHOUT LONG-TERM CURRENT USE OF INSULIN (HCC): ICD-10-CM

## 2023-02-16 RX ORDER — FLASH GLUCOSE SENSOR
1 KIT MISCELLANEOUS
Qty: 6 EACH | Refills: 0 | Status: SHIPPED | OUTPATIENT
Start: 2023-02-16

## 2023-03-08 DIAGNOSIS — E11.65 TYPE 2 DIABETES MELLITUS WITH HYPERGLYCEMIA, WITHOUT LONG-TERM CURRENT USE OF INSULIN (HCC): ICD-10-CM

## 2023-03-08 RX ORDER — GLIPIZIDE 2.5 MG/1
TABLET, EXTENDED RELEASE ORAL
Qty: 90 TABLET | Refills: 1 | Status: SHIPPED | OUTPATIENT
Start: 2023-03-08

## 2023-03-27 ENCOUNTER — HOSPITAL ENCOUNTER (OUTPATIENT)
Dept: CT IMAGING | Facility: HOSPITAL | Age: 63
Discharge: HOME | End: 2023-03-27

## 2023-03-27 DIAGNOSIS — R60.0 LOCALIZED EDEMA: ICD-10-CM

## 2023-03-30 ENCOUNTER — HOSPITAL ENCOUNTER (OUTPATIENT)
Dept: SLEEP CENTER | Facility: CLINIC | Age: 63
Discharge: HOME | End: 2023-03-30

## 2023-03-30 DIAGNOSIS — G47.33 OBSTRUCTIVE SLEEP APNEA-HYPOPNEA SYNDROME: ICD-10-CM

## 2023-03-31 NOTE — PROGRESS NOTES
Home Sleep Study Documentation    HOME STUDY DEVICE: Noxturnal no                                           Anna G3 yes      Pre-Sleep Home Study:    Set-up and instructions performed by: Darci Severe, RPSGT    Technician performed demonstration for Patient: yes    Return demonstration performed by Patient: yes    Written instructions provided to Patient: yes    Patient signed consent form: yes        Post-Sleep Home Study:    Additional comments by Patient: pending    Home Sleep Study Failed:pending    Failure reason: pending    Reported or Detected: pending    Scored by: pending

## 2023-04-23 DIAGNOSIS — E11.65 UNCONTROLLED TYPE 2 DIABETES MELLITUS WITH HYPERGLYCEMIA (HCC): ICD-10-CM

## 2023-04-24 RX ORDER — METFORMIN HYDROCHLORIDE 500 MG/1
TABLET, EXTENDED RELEASE ORAL
Qty: 360 TABLET | Refills: 2 | Status: SHIPPED | OUTPATIENT
Start: 2023-04-24

## 2023-05-08 ENCOUNTER — OFFICE VISIT (OUTPATIENT)
Dept: ENDOCRINOLOGY | Facility: CLINIC | Age: 63
End: 2023-05-08

## 2023-05-08 VITALS
BODY MASS INDEX: 44.65 KG/M2 | HEIGHT: 65 IN | WEIGHT: 268 LBS | SYSTOLIC BLOOD PRESSURE: 130 MMHG | DIASTOLIC BLOOD PRESSURE: 84 MMHG

## 2023-05-08 DIAGNOSIS — E78.2 MIXED HYPERLIPIDEMIA: ICD-10-CM

## 2023-05-08 DIAGNOSIS — E04.1 THYROID NODULE INCIDENTALLY NOTED ON IMAGING STUDY: ICD-10-CM

## 2023-05-08 DIAGNOSIS — E11.65 TYPE 2 DIABETES MELLITUS WITH HYPERGLYCEMIA, WITHOUT LONG-TERM CURRENT USE OF INSULIN (HCC): Primary | ICD-10-CM

## 2023-05-08 DIAGNOSIS — I10 ESSENTIAL HYPERTENSION: ICD-10-CM

## 2023-05-08 LAB — SL AMB POCT HEMOGLOBIN AIC: 7.7 (ref ?–6.5)

## 2023-05-08 RX ORDER — GLIPIZIDE 2.5 MG/1
5 TABLET, EXTENDED RELEASE ORAL DAILY
Qty: 180 TABLET | Refills: 1
Start: 2023-05-08 | End: 2023-05-08

## 2023-05-08 RX ORDER — GLIPIZIDE 2.5 MG/1
2.5 TABLET, EXTENDED RELEASE ORAL 2 TIMES DAILY WITH MEALS
Qty: 180 TABLET | Refills: 1
Start: 2023-05-08

## 2023-05-08 RX ORDER — BLOOD-GLUCOSE SENSOR
EACH MISCELLANEOUS
Qty: 6 EACH | Refills: 1 | Status: SHIPPED | OUTPATIENT
Start: 2023-05-08

## 2023-05-08 RX ORDER — ZOLPIDEM TARTRATE 5 MG/1
2.5 TABLET ORAL
Qty: 30 TABLET | Status: SHIPPED
Start: 2023-05-08

## 2023-05-08 NOTE — PROGRESS NOTES
Patient Progress Note      Chief Complaint   Patient presents with   • Diabetes Mellitus   • Diabetes Type 2      Referring Provider  No referring provider defined for this encounter  History of Present Illness:   Chadwick Chavez is a 65 y o  female with a history of type 2 diabetes without long term use of insulin  She has been off of insulin since her gastric sleeve surgery  Diabetes course has been stable  She was hospitalized in August 2022 for vestibular issues and possible TIA  Denies any issues with her current regimen  Home glucose monitoring: are performed regularly, using Freestyle Ankit  CGM is active 12% of the time  Average glucose 168 mg/dl, in target range 14% (100-140 mg/dl), above range 86%, below range 0%     Current regimen: metformin 1000 mg BID, Farxiga 10 mg daily, glipizide 2 5 mg daily  compliant most of the time, denies any side effects from medications  Hypoglycemic episodes: No, rare  H/o of hypoglycemia causing hospitalization or Intervention such as glucagon injection or ambulance call:  No  Hypoglycemia symptoms: jitteriness  Treatment of hypoglycemia: discussed treatment     Medic alert tag: recommended: Yes     Diabetes education: Not recently  Diet: 3 meals per day, 2 snacks per day  Timing of meals is predictable  Diabetic diet compliance:  noncompliant some of the time  Activity: Daily activity is predictable: Yes  No routine exercise  Ophthamology: scheduled for the summer  [facility] in [address]  Podiatry: Nov 2021      Has hypertension: on ACE inhibitor/ARB, compliant most of the time  Has hyperlipidemia: on statin - tolerating well, no myalgias  compliant most of the time, denies any side effects from medications  Thyroid disorders: Thyroid nodule  US done August 2022   FINDINGS:  Normal homogeneous smooth echotexture  Right lobe: 5 6 x 1 9 x 2 3 cm  Volume 11 3 mL  Left lobe:  4 2 x 1 5 x 1 6 cm  Volume 4 8 mL  Isthmus: 0 6  cm       Nodule #1   Image 24  RIGHT upper pole nodule measuring 2 4 x 0 9 x 1 5 cm  COMPOSITION:  2 points, solid or almost completely solid   ECHOGENICITY:  1 point, hyperechoic or isoechoic  SHAPE:  0 points, wider-than-tall  MARGIN: 0 points, ill-defined  ECHOGENIC FOCI:  0 points, none or large comet-tail artifacts  TI-RADS Classification: TR 3 (3 points), FNA if >2 5 cm  Follow if >1 5 cm  There are additional nodules of lesser size and/or TI-RADS score  These do not necessitate additional evaluation based on ACR criteria  IMPRESSION:     2 4 cm right upper pole thyroid nodule  No nodule meets current ACR criteria for requiring biopsy but followup ultrasound is recommended in 1 year        History of pancreatitis: Yes (from [place])    Patient Active Problem List   Diagnosis   • Type 2 diabetes mellitus ([facility] [address] )   • Essential hypertension   • Mixed hyperlipidemia   • Type 2 diabetes mellitus with hyperglycemia, without long-term current use of insulin (HCC)   • Vertigo   • Tinnitus of left ear   • Numbness and tingling of left side of face   • Class 3 severe obesity in adult [facility])   • Thyroid nodule incidentally noted on imaging study   • Obstructive sleep apnea-hypopnea syndrome   • Headache      Past Medical History:   Diagnosis Date   • Acid reflux    • CKD (chronic kidney disease), stage II    • Diabetes mellitus (HCC)    • Dizziness    • Essential hypertension    • Hyperlipidemia    • Hypertension    • Type 2 diabetes mellitus (HCC)       Past Surgical History:   Procedure Laterality Date   • ANKLE SURGERY  Nov 2019   • ARTHROSCOPIC REPAIR ACL Right     knee   • BARIATRIC SURGERY      gastric sleeve   • GALLBLADDER SURGERY     • SKIN BIOPSY      scalp   • TENDON REPAIR Left     ankle   • TONSILLECTOMY        Family History   Problem Relation Age of Onset   • Hypertension Mother    • Diabetes type II Father    • Diabetes Father    • Diabetes type II Paternal Grandmother    • Cancer Paternal Grandmother    • Breast cancer Paternal Grandmother 79   • Breast cancer Paternal Aunt 76     Social History     Tobacco Use   • Smoking status: Never   • Smokeless tobacco: Never   Substance Use Topics   • Alcohol use: Yes     Alcohol/week: 5 0 standard drinks     Types: 5 Glasses of wine per week     Comment: occ        Allergies   Allergen Reactions   • Ampicillin Hives   • Darvon [Propoxyphene] Vomiting   • Lipitor [Atorvastatin] Arthralgia   • Vioxx [Rofecoxib]          Current Outpatient Medications:   •  amitriptyline (ELAVIL) 25 mg tablet, Take 1 tablet (25 mg total) by mouth daily at bedtime, Disp: 30 tablet, Rfl: 3  •  Biotin 1000 MCG CHEW, Chew 6,000 mcg, Disp: , Rfl:   •  Calcium 500 MG tablet, Take by mouth, Disp: , Rfl:   •  Cholecalciferol 50 MCG (2000 UT) CAPS, Take 500 Units by mouth, Disp: , Rfl:   •  Continuous Blood Gluc  (FREESTYLE FRANKI 14 DAY READER) SUZY, by Does not apply route, Disp: , Rfl:   •  Continuous Blood Gluc Sensor (FreeStyle Franki 14 Day Sensor) MISC, Use 1 Device every 14 (fourteen) days, Disp: 6 each, Rfl: 0  •  DULoxetine (CYMBALTA) 30 mg delayed release capsule, Take 30 mg by mouth daily, Disp: , Rfl:   •  Farxiga 10 MG tablet, TAKE 1 TABLET BY MOUTH EVERY DAY, Disp: 30 tablet, Rfl: 1  •  glipiZIDE (GLUCOTROL XL) 2 5 mg 24 hr tablet, Take 2 tablets (5 mg total) by mouth daily, Disp: 180 tablet, Rfl: 1  •  lansoprazole (PREVACID) 15 mg capsule, Take 15 mg by mouth daily, Disp: , Rfl:   •  lisinopril (ZESTRIL) 2 5 mg tablet, Take 2 5 mg by mouth daily, Disp: , Rfl:   •  metFORMIN (GLUCOPHAGE-XR) 500 mg 24 hr tablet, TAKE 2 TABLETS BY MOUTH TWICE A DAY WITH FOOD, Disp: 360 tablet, Rfl: 2  •  Multiple Vitamins-Minerals (MULTIVITAMIN WITH MINERALS) tablet, Take 1 tablet by mouth daily, Disp: , Rfl:   •  nystatin (MYCOSTATIN) cream, Apply topically as needed, Disp: , Rfl:   •  nystatin (MYCOSTATIN) powder, , Disp: , Rfl:   •  Probiotic Product (ALIGN) 4 MG CAPS, Take by "mouth, Disp: , Rfl:   •  Rimegepant Sulfate (Nurtec) 75 MG TBDP, Take one NURTEC 75 mg at onset under tongue  Limit 1 in 24 hours  8 a month , Disp: 8 tablet, Rfl: 3  •  rosuvastatin (CRESTOR) 5 mg tablet, Take 5 mg by mouth daily, Disp: , Rfl:   •  zolpidem (AMBIEN) 5 mg tablet, Take 2 5 mg by mouth daily at bedtime as needed for sleep, Disp: , Rfl:   •  fluconazole (DIFLUCAN) 200 mg tablet, , Disp: , Rfl:   •  meclizine (ANTIVERT) 25 mg tablet, Take 1 tablet (25 mg total) by mouth 3 (three) times a day as needed for dizziness (Patient not taking: Reported on 5/8/2023), Disp: 30 tablet, Rfl: 0  •  ondansetron (ZOFRAN) 4 mg tablet, Take 1 tablet (4 mg total) by mouth every 6 (six) hours (Patient not taking: Reported on 2/2/2023), Disp: 12 tablet, Rfl: 0  •  predniSONE 10 mg tablet, Take 30 mg daily for 3 days  Then take 20 mg daily for 3 days  Then take 10 mg daily for 4 days  Then stop  (Patient not taking: Reported on 9/14/2022), Disp: 19 tablet, Rfl: 0  •  vitamin B-12 (VITAMIN B-12) 1,000 mcg tablet, Take 5,000 mcg by mouth daily Takes 1 daily (Patient not taking: Reported on 2/2/2023), Disp: , Rfl:   Review of Systems   Constitutional: Negative for activity change, appetite change, fatigue and unexpected weight change  HENT: Negative for trouble swallowing  Eyes: Negative for visual disturbance  Respiratory: Negative for shortness of breath  Cardiovascular: Negative for chest pain and palpitations  Gastrointestinal: Negative for constipation and diarrhea  Endocrine: Negative for polydipsia and polyuria  Musculoskeletal: Positive for arthralgias  Skin: Negative for wound  Neurological: Negative for numbness  Psychiatric/Behavioral: Negative  Physical Exam:  Body mass index is 44 6 kg/m²    /84 (BP Location: Left arm, Patient Position: Sitting, Cuff Size: Large)   Ht 5' 5\" (1 651 m)   Wt 122 kg (268 lb)   BMI 44 60 kg/m²    Wt Readings from Last 3 Encounters:   05/08/23 122 kg " (268 lb)   02/02/23 122 kg (268 lb 1 6 oz)   01/09/23 118 kg (260 lb)       Physical Exam  Vitals and nursing note reviewed  Constitutional:       Appearance: She is well-developed  HENT:      Head: Normocephalic  Eyes:      General: No scleral icterus  Pupils: Pupils are equal, round, and reactive to light  Neck:      Thyroid: No thyromegaly  Cardiovascular:      Rate and Rhythm: Normal rate and regular rhythm  Pulses:           Radial pulses are 2+ on the right side and 2+ on the left side  Heart sounds: No murmur heard  Pulmonary:      Effort: Pulmonary effort is normal  No respiratory distress  Breath sounds: Normal breath sounds  No wheezing  Musculoskeletal:      Cervical back: Neck supple  Skin:     General: Skin is warm and dry  Neurological:      Mental Status: She is alert  Patient's shoes and socks were not removed  Labs:   Lab Results   Component Value Date    HGBA1C 7 7 (A) 05/08/2023     Lab Results   Component Value Date    CALCIUM 9 4 09/13/2022    K 4 5 09/13/2022    CO2 26 09/13/2022     09/13/2022    BUN 15 09/13/2022    CREATININE 0 83 09/13/2022     No results found for: Rodnathanael Robi  eGFR   Date Value Ref Range Status   09/13/2022 76 ml/min/1 73sq m Final     No components found for: [facility]  Lab Results   Component Value Date    HDL 53 09/13/2022    TRIG 153 (H) 09/13/2022     Lab Results   Component Value Date    ALT 19 07/16/2022    AST 15 07/16/2022    ALKPHOS 52 07/16/2022     No results found for: [id number], TSH, K3EEJGQ, W5MRONM, THYROIDAB        Plan:    Tawana Pineda was seen today for diabetes mellitus and diabetes type 2  Diagnoses and all orders for this visit:    Type 2 diabetes mellitus with hyperglycemia, without long-term current use of insulin (HCC)  HGA1C 7 7%  Improved  Treatment regimen: increase glipizide to 2 5 mg BID  Continue other treatments  Prescribed Ankit 3     Episodes of hypoglycemia can lead to permanent disability and death  Discussed risks/complications associated with uncontrolled diabetes  Advised to adhere to diabetic diet, and recommended staying active/exercising routinely as tolerated  Keep carbohydrates consistent to limit blood glucose fluctuations  Advised to call if blood sugars less than 70 mg/dl or over 300 mg/dl  Check blood glucose 3+ times a day  Discussed symptoms and treatment of hypoglycemia  Discussed use of CGM to collect additional blood glucose data to reveal trends and patterns that can be used to optimize treatment plan  Recommended routine follow-up with podiatry and ophthalmology  Ordered blood work to complete prior to next visit  -     POCT hemoglobin A1c  -     Hemoglobin A1C; Future  -     Basic metabolic panel; Future  -     Microalbumin / creatinine urine ratio; Future  -     glipiZIDE (GLUCOTROL XL) 2 5 mg 24 hr tablet; Take 2 tablets (5 mg total) by mouth daily    Essential hypertension  Blood pressure adequately controlled, continue current treatment   -     Basic metabolic panel; Future    Mixed hyperlipidemia  LDL previously 64  Continue statin therapy   Managed by PCP     Thyroid nodule incidentally noted on imaging study  F/u US due in August 2023  Patient prefers ENT to follow it  Discussed with the patient diagnosis and treatment and all questions fully answered  She will call me if any problems arise  Counseled patient on diagnostic results, prognosis, risk and benefit of treatment options, instruction for management, importance of treatment compliance, risk factor reduction and impressions        Brenda Mary PA-C

## 2023-05-08 NOTE — PATIENT INSTRUCTIONS
Hypoglycemia instructions   James Flatarabella  5/8/2023  [id number]    Low Blood Sugar    Steps to treat low blood sugar  1  Test blood sugar if you have symptoms of low blood sugar:   Low Blood Sugar Symptoms:  o Sweaty  o Dizzy  o Rapid heartbeat  o Shaky  o Bad mood  o Hungry      2  Treat blood sugar less than 70 with 15 grams of fast-acting carbohydrate:   Examples of 15 grams Fast-Acting Carbohydrate:  o 4 oz juice  o 4 oz regular soda  o 3-4 glucose tablets (chew)  o 3-4 hard candies (chew)          3  Wait 15 minutes and test your blood sugar again     4   If blood sugar is less than 100, repeat steps 2-3     5  When your blood sugar is 100 or more, eat a snack if it will be longer than one hour until your next meal  The snack should be 15 grams of carbohydrate and a protein:   Examples of snacks:  o ½ sandwich  o 6 crackers with cheese  o Piece of fruit with cheese or peanut butter  o 6 crackers with peanut butter

## 2023-06-05 DIAGNOSIS — E11.65 TYPE 2 DIABETES MELLITUS WITH HYPERGLYCEMIA, WITHOUT LONG-TERM CURRENT USE OF INSULIN (HCC): ICD-10-CM

## 2023-06-05 RX ORDER — DAPAGLIFLOZIN 10 MG/1
TABLET, FILM COATED ORAL
Qty: 30 TABLET | Refills: 3 | Status: SHIPPED | OUTPATIENT
Start: 2023-06-05

## 2023-07-19 DIAGNOSIS — E11.65 TYPE 2 DIABETES MELLITUS WITH HYPERGLYCEMIA, WITHOUT LONG-TERM CURRENT USE OF INSULIN (HCC): ICD-10-CM

## 2023-07-19 RX ORDER — GLIPIZIDE 2.5 MG/1
2.5 TABLET, EXTENDED RELEASE ORAL DAILY
Qty: 90 TABLET | Refills: 3 | Status: SHIPPED | OUTPATIENT
Start: 2023-07-19 | End: 2024-07-13

## 2023-08-02 ENCOUNTER — OFFICE VISIT (OUTPATIENT)
Dept: NEUROLOGY | Facility: CLINIC | Age: 63
End: 2023-08-02
Payer: COMMERCIAL

## 2023-08-02 VITALS
HEART RATE: 77 BPM | BODY MASS INDEX: 44.52 KG/M2 | SYSTOLIC BLOOD PRESSURE: 122 MMHG | OXYGEN SATURATION: 96 % | DIASTOLIC BLOOD PRESSURE: 86 MMHG | HEIGHT: 65 IN | TEMPERATURE: 97.9 F | WEIGHT: 267.2 LBS

## 2023-08-02 DIAGNOSIS — E66.01 MORBID OBESITY (HCC): ICD-10-CM

## 2023-08-02 DIAGNOSIS — G43.009 MIGRAINE WITHOUT AURA AND WITHOUT STATUS MIGRAINOSUS, NOT INTRACTABLE: Primary | ICD-10-CM

## 2023-08-02 DIAGNOSIS — F41.9 ANXIETY DISORDER, UNSPECIFIED TYPE: ICD-10-CM

## 2023-08-02 DIAGNOSIS — G47.33 OBSTRUCTIVE SLEEP APNEA (ADULT) (PEDIATRIC): ICD-10-CM

## 2023-08-02 PROCEDURE — 99214 OFFICE O/P EST MOD 30 MIN: CPT | Performed by: STUDENT IN AN ORGANIZED HEALTH CARE EDUCATION/TRAINING PROGRAM

## 2023-08-02 RX ORDER — AMITRIPTYLINE HYDROCHLORIDE 10 MG/1
10 TABLET, FILM COATED ORAL
Qty: 30 TABLET | Refills: 6 | Status: SHIPPED | OUTPATIENT
Start: 2023-08-02

## 2023-08-02 NOTE — PATIENT INSTRUCTIONS
Headache Calendar  Please maintain a headache calendar  Consider using phone applications such as Migraine Blu or [place] Migraine Tracker     Headache/migraine treatment:   Acute medications (for immediate treatment of a headache): It is ok to take ibuprofen, acetaminophen or naproxen (Advil, Tylenol,  Aleve, Excedrin) if they help your headaches you should limit these to No more than 2-3 times a week to avoid medication overuse/rebound headaches. For your more moderate to severe migraines take this medication early  Nurtec 75mg oral dissolving tablet      Prescription preventive medications for headaches/migraines   (to take every day to help prevent headaches - not to take at the time of headache):  Continue Amitriptyline 10mg at bedtime     *Typically these types of medications take time until you see the benefit, although some may see improvement in days, often it may take weeks, especially if the medication is being titrated up to a beneficial level. Please contact us if there are any concerns or questions regarding the medication. Lifestyle Recommendations:  [x] SLEEP - Maintain a regular sleep schedule: Adults need at least 7-8 hours of uninterrupted a night. Maintain good sleep hygiene:  Going to bed and waking up at consistent times, avoiding excessive daytime naps, avoiding caffeinated beverages in the evening, avoid excessive stimulation in the evening and generally using bed primarily for sleeping. One hour before bedtime would recommend turning lights down lower, decreasing your activity (may read quietly, listen to music at a low volume). When you get into bed, should eliminate all technology (no texting, emailing, playing with your phone, iPad or tablet in bed). [x] HYDRATION - Maintain good hydration. Drink  2L of fluid a day (4 typical small water bottles)  [x] DIET - Maintain good nutrition. In particular don't skip meals and try and eat healthy balanced meals regularly.   [x] TRIGGERS - Look for other triggers and avoid them: Limit caffeine to 1-2 cups a day or less. Avoid dietary triggers that you have noticed bring on your headaches (this could include aged cheese, peanuts, MSG, aspartame and nitrates). [x] EXERCISE - physical exercise as we all know is good for you in many ways, and not only is good for your heart, but also is beneficial for your mental health, cognitive health and  chronic pain/headaches. I would encourage at the least 5 days of physical exercise weekly for at least 30 minutes. Education and Follow-up  [x] Please call with any questions or concerns. Of course if any new concerning symptoms go to the emergency department.   [x] Follow up in 8 months

## 2023-08-02 NOTE — PROGRESS NOTES
Review of Systems   Constitutional: Negative for appetite change, fatigue and fever. HENT: Negative. Negative for hearing loss, tinnitus, trouble swallowing and voice change. Eyes: Positive for photophobia. Negative for pain and visual disturbance. Respiratory: Negative. Negative for shortness of breath. Cardiovascular: Negative. Negative for palpitations. Gastrointestinal: Negative. Negative for nausea and vomiting. Endocrine: Negative. Negative for cold intolerance. Genitourinary: Negative. Negative for dysuria, frequency and urgency. Musculoskeletal: Negative for back pain, gait problem, myalgias and neck pain. Skin: Negative. Negative for rash. Allergic/Immunologic: Negative. Neurological: Positive for headaches. Negative for dizziness, tremors, seizures, syncope, facial asymmetry, speech difficulty, weakness, light-headedness and numbness. Hematological: Negative. Does not bruise/bleed easily. Psychiatric/Behavioral: Negative. Negative for confusion, hallucinations and sleep disturbance. All other systems reviewed and are negative. Since your last visit are your headaches improved    Any change to the headache type? no    What is your current headache frequency: none for past month, about 1 per month or less    Are you taking your current medications as prescribed? Yes    If no, why not?      Do you have any side effects? no    How may days per week do you take an abortive medicine? 0

## 2023-08-02 NOTE — PROGRESS NOTES
[facility] Consult - Follow up   PATIENT:  Andie Sheppard  MRN:  [id number]  :  1960  DATE OF SERVICE:  2023  REFERRED BY: No ref. provider found  PMD: Sherly Peter MD    Assessment/Plan:   Chacho Aguillon a delightful 62 y.o. female with a past medical history that includes DM2, HTN, HLD, vertigo, obesity here for f/u evaluation of headache. At today's visit, she reports that she is doing exceptionally well. Experiencing less than 1 headache day per month. She was interested in decreasing amitriptyline, so I have cut it back to 10 mg daily. I will have her continue with Nurtec for abortive management. At her follow-up visit or just prior to that, we can always consider having her taper off of the amitriptyline entirely to see how she does. We did discuss treatment of sleep apnea, but she was interested in trying alternative methods from CPAP at this time, so I have encouraged her to reach out to sleep medicine to see if they have any information on this outside of weight loss and sleeping on her side. Workup:  -MRI brain without contrast 2022: No acute intracranial findings.   White matter changes.  -Home sleep study 2023: Mild obstructive sleep apnea     Preventative:  - we discussed headache hygiene and lifestyle factors that may improve headaches  - Decrease Amitriptyline to 10mg  - Currently on through other providers: Cymbalta, Lisinopril  - Past/ failed/contraindicated: None  - future options: CGRP med, botox     Acute:  - discussed not taking over-the-counter or prescription pain medications more than 2-3 days per week to prevent medication overuse/rebound headache  - Nurtec 75mg ODT  - Currently on through other providers: None  - Past/ failed/contraindicated: Avoid triptans for the time being out of concern for possible TIA  - future options: prochlorperazine, Toradol IM or p.o., could consider trial of 5 days of Depakote 500 mg nightly or dexamethasone 2 mg daily for prolonged migraine, Pee Spray  Patient instructions   Headache Calendar  Please maintain a headache calendar  Consider using phone applications such as Migraine Blu or [employer] Migraine Tracker     Headache/migraine treatment:   Acute medications (for immediate treatment of a headache): It is ok to take ibuprofen, acetaminophen or naproxen (Advil, Tylenol,  Aleve, Excedrin) if they help your headaches you should limit these to No more than 2-3 times a week to avoid medication overuse/rebound headaches.      For your more moderate to severe migraines take this medication early  Nurtec 75mg oral dissolving tablet      Prescription preventive medications for headaches/migraines   (to take every day to help prevent headaches - not to take at the time of headache):  Continue Amitriptyline 10mg at bedtime     *Typically these types of medications take time until you see the benefit, although some may see improvement in days, often it may take weeks, especially if the medication is being titrated up to a beneficial level. Please contact us if there are any concerns or questions regarding the medication.      Lifestyle Recommendations:  [x]? ? SLEEP - Maintain a regular sleep schedule: Adults need at least 7-8 hours of uninterrupted a night. Maintain good sleep hygiene:  Going to bed and waking up at consistent times, avoiding excessive daytime naps, avoiding caffeinated beverages in the evening, avoid excessive stimulation in the evening and generally using bed primarily for sleeping.  One hour before bedtime would recommend turning lights down lower, decreasing your activity (may read quietly, listen to music at a low volume). When you get into bed, should eliminate all technology (no texting, emailing, playing with your phone, iPad or tablet in bed). [x]?? HYDRATION - Maintain good hydration.  Drink  2L of fluid a day (4 typical small water bottles)  [x]? ? DIET - Maintain good nutrition. In particular don't skip meals and try and eat healthy balanced meals regularly. [x]? ? TRIGGERS - Look for other triggers and avoid them: Limit caffeine to 1-2 cups a day or less. Avoid dietary triggers that you have noticed bring on your headaches (this could include aged cheese, peanuts, MSG, aspartame and nitrates). [x]? ? EXERCISE - physical exercise as we all know is good for you in many ways, and not only is good for your heart, but also is beneficial for your mental health, cognitive health and  chronic pain/headaches. I would encourage at the least 5 days of physical exercise weekly for at least 30 minutes.      Education and Follow-up  [x]? ? Please call with any questions or concerns. Of course if any new concerning symptoms go to the emergency department. [x]? ? Follow up in 6 months  Subjective:   8/2/23: At today's visit, she reports that she is doing exceptionally well. Continues to take amitriptyline daily without any issues, but is interested in decreasing the dose. States that over the last month she has essentially been headache free and typically only experiences about 1 headache day per month at this time. She has only taken Nurtec once and reports that it worked well. Previous History:  2/2/23: Since her last visit, she reports no significant improvement in terms of her headaches. Currently experiencing about 8 headache days per month. Reports that they don't last as long. Continuing to take Amitriptyline 25mg daily, but notes some ongoing grogginess with it in the morning. She saw her ENT recently who recommended that she stay on it for at least 6 months. They recommended that she started Mg supplements. Nurtec works well for her, but has only taken it once.    10/25/22: Ms. Facundo Samuels a history of headaches that have been ongoing since she was in her 25s.  She had a period of time where she was mostly headache-free but since her COVID infection in May of this year she reports recurrence of headaches that started around July.  At that same time she was also having ringing/motor sounds in her ear as well as periodic episodes of dizziness. Jackson Renteria has been evaluated by ENT and PT of been treating her for suspected Menières.  She was also evaluated in the hospital during an episode and there was concern for TIA versus migraine related symptoms.  Based on her previous history and current description of her headaches I suspect that hospital visit was related to migraine and less likely TIA.  She reports that she is doing well at the moment and has been continuing to improve especially over the last month.  We decided that it would be best to continue amitriptyline 25 mg for the time being but I will also have her try Nurtec as an abortive for the more moderate to severe episodes.  Avoiding triptans out of concern for this possible TIA recently. Asaf Small, she is pending a sleep study next month which may be beneficial if she continues to have sleep apnea as she was previously diagnosed with for her bariatric surgery.    Past Medical History:     Past Medical History:   Diagnosis Date   • Acid reflux    • CKD (chronic kidney disease), stage II    • Diabetes mellitus ([address])    • Dizziness    • Essential hypertension    • Hyperlipidemia    • Hypertension    • Type 2 diabetes mellitus ([address])        Patient Active Problem List   Diagnosis   • Type 2 diabetes mellitus ([address])   • Essential hypertension   • Mixed hyperlipidemia   • Type 2 diabetes mellitus with hyperglycemia, without long-term current use of insulin ([facility])   • Vertigo   • Tinnitus of left ear   • Numbness and tingling of left side of face   • Class 3 severe obesity in adult [facility])   • Thyroid nodule incidentally noted on imaging study   • Obstructive sleep apnea-hypopnea syndrome   • Headache       Medications:      Current Outpatient Medications   Medication Sig Dispense Refill   • amitriptyline (ELAVIL) 25 mg tablet Take 1 tablet (25 mg total) by mouth daily at bedtime 30 tablet 3   • Biotin 1000 MCG CHEW Chew 6,000 mcg     • Calcium 500 MG tablet Take by mouth     • Continuous Blood Gluc  (FREESTYLE FRANKI 14 DAY READER) SUZY by Does not apply route     • Continuous Blood Gluc Sensor (FreeStyle Franki 14 Day Sensor) MISC Use 1 Device every 14 (fourteen) days 6 each 0   • Continuous Blood Gluc Sensor (FreeStyle Franki 3 Sensor) MISC Apply every 14 days 6 each 1   • DULoxetine (CYMBALTA) 30 mg delayed release capsule Take 30 mg by mouth daily     • Farxiga 10 MG tablet TAKE 1 TABLET BY MOUTH EVERY DAY 30 tablet 3   • fluconazole (DIFLUCAN) 200 mg tablet      • glipiZIDE (GLUCOTROL XL) 2.5 mg 24 hr tablet Take 1 tablet (2.5 mg total) by mouth daily (Patient taking differently: Take 2.5 mg by mouth 2 (two) times a day) 90 tablet 3   • lansoprazole (PREVACID) 15 mg capsule Take 15 mg by mouth daily     • lisinopril (ZESTRIL) 2.5 mg tablet Take 2.5 mg by mouth daily     • meclizine (ANTIVERT) 25 mg tablet Take 1 tablet (25 mg total) by mouth 3 (three) times a day as needed for dizziness 30 tablet 0   • metFORMIN (GLUCOPHAGE-XR) 500 mg 24 hr tablet TAKE 2 TABLETS BY MOUTH TWICE A DAY WITH FOOD 360 tablet 2   • Multiple Vitamins-Minerals (MULTIVITAMIN WITH MINERALS) tablet Take 1 tablet by mouth daily     • nystatin (MYCOSTATIN) cream Apply topically as needed     • nystatin (MYCOSTATIN) powder      • ondansetron (ZOFRAN) 4 mg tablet Take 1 tablet (4 mg total) by mouth every 6 (six) hours 12 tablet 0   • Probiotic Product (ALIGN) 4 MG CAPS Take by mouth     • Rimegepant Sulfate (Nurtec) 75 MG TBDP Take one NURTEC 75 mg at onset under tongue. Limit 1 in 24 hours. 8 a month.  8 tablet 3   • rosuvastatin (CRESTOR) 5 mg tablet Take 5 mg by mouth daily     • zolpidem (AMBIEN) 5 mg tablet Take 0.5 tablets (2.5 mg total) by mouth daily at bedtime as needed 30 tablet    • Cholecalciferol 50 MCG (2000 UT) CAPS Take 500 Units by mouth (Patient not taking: Reported on 8/2/2023)     • predniSONE 10 mg tablet Take 30 mg daily for 3 days. Then take 20 mg daily for 3 days. Then take 10 mg daily for 4 days. Then stop. (Patient not taking: Reported on 9/14/2022) 19 tablet 0   • vitamin B-12 (VITAMIN B-12) 1,000 mcg tablet Take 5,000 mcg by mouth daily Takes 1 daily (Patient not taking: Reported on 2/2/2023)       No current facility-administered medications for this visit. Allergies: Allergies   Allergen Reactions   • Ampicillin Hives   • Darvon [Propoxyphene] Vomiting   • Lipitor [Atorvastatin] Arthralgia   • Vioxx [Rofecoxib]        Family History:     Family History   Problem Relation Age of Onset   • Hypertension Mother    • Diabetes type II Father    • Diabetes Father    • Diabetes type II Paternal Grandmother    • Cancer Paternal Grandmother    • Breast cancer Paternal Grandmother 79   • Breast cancer Paternal Aunt 76       Social History:     Social History     Socioeconomic History   • Marital status: /Civil Union     Spouse name: Not on file   • Number of children: Not on file   • Years of education: Not on file   • Highest education level: Not on file   Occupational History   • Not on file   Tobacco Use   • Smoking status: Never   • Smokeless tobacco: Never   Vaping Use   • Vaping Use: Never used   Substance and Sexual Activity   • Alcohol use: Yes     Alcohol/week: 5.0 standard drinks of alcohol     Types: 5 Glasses of wine per week     Comment: occ.     • Drug use: Never   • Sexual activity: Not Currently     Partners: Male     Birth control/protection: None   Other Topics Concern   • Not on file   Social History Narrative   • Not on file     Social Determinants of Health     Financial Resource Strain: Not on file   Food Insecurity: Not on file   Transportation Needs: Not on file   Physical Activity: Not on file   Stress: Not on file   Social Connections: Not on file   Intimate Partner Violence: Not on file   Housing Stability: Not on file Objective:   Physical Exam:                                                               Vitals:            Constitutional:  /86 (BP Location: Left arm, Patient Position: Sitting, Cuff Size: Large)   Pulse 77   Temp 97.9 °F (36.6 °C) (Temporal)   Ht 5' 5" (1.651 m)   Wt 121 kg (267 lb 3.2 oz)   SpO2 96%   BMI 44.46 kg/m²   BP Readings from Last 3 Encounters:   08/02/23 122/86   05/08/23 130/84   02/02/23 130/76     Pulse Readings from Last 3 Encounters:   08/02/23 77   02/02/23 68   11/15/22 96         Well developed, well nourished, well groomed. No dysmorphic features. HEENT:  Normocephalic atraumatic. See neuro exam   Chest:  Respirations appear regular and unlabored. Cardiovascular:  no observed significant swelling. Musculoskeletal:  (see below under neurologic exam for evaluation of motor function and gait)   Skin:  warm and dry, not diaphoretic. Psychiatric:  Normal behavior and appropriate affect       Neurological Examination:     Mental status/cognitive function:   Recent and remote memory intact. Attention span and concentration as well as fund of knowledge are appropriate for age. Normal language and spontaneous speech. Cranial Nerves:  III, IV, VI-Pupils were equal, round. Extraocular movements were full and conjugate   VII-facial expression symmetric  VIII-hearing grossly intact bilaterally   Motor Exam: symmetric bulk throughout. no atrophy, fasciculations or abnormal movements noted. Coordination:  no apparent dysmetria, ataxia or tremor noted  Gait: steady casual gait  Review of Systems:   Constitutional: Negative for appetite change, fatigue and fever. HENT: Negative. Negative for hearing loss, tinnitus, trouble swallowing and voice change. Eyes: Positive for photophobia. Negative for pain and visual disturbance. Respiratory: Negative. Negative for shortness of breath. Cardiovascular: Negative. Negative for palpitations.    Gastrointestinal: Negative. Negative for nausea and vomiting. Endocrine: Negative. Negative for cold intolerance. Genitourinary: Negative. Negative for dysuria, frequency and urgency. Musculoskeletal: Negative for back pain, gait problem, myalgias and neck pain. Skin: Negative. Negative for rash. Allergic/Immunologic: Negative. Neurological: Positive for headaches. Negative for dizziness, tremors, seizures, syncope, facial asymmetry, speech difficulty, weakness, light-headedness and numbness. Hematological: Negative. Does not bruise/bleed easily. Psychiatric/Behavioral: Negative. Negative for confusion, hallucinations and sleep disturbance. All other systems reviewed and are negative. I have spent 15 minutes with Patient and family today in which greater than 50% of this time was spent in counseling/coordination of care regarding Diagnostic results, Prognosis, Risks and benefits of tx options, Patient and family education, Impressions, Documenting in the medical record and Obtaining or reviewing history  . I also spent 15 minutes non face to face for this patient the same day.      Activity Minutes   Precharting/reviewing 10   Patient care/counseling 15   Postcharting/care coordination 5       Author:  Lilibeth Shelby DO 8/2/2023 8:53 AM

## 2023-08-19 DIAGNOSIS — E11.65 TYPE 2 DIABETES MELLITUS WITH HYPERGLYCEMIA, WITHOUT LONG-TERM CURRENT USE OF INSULIN (HCC): ICD-10-CM

## 2023-08-21 RX ORDER — GLIMEPIRIDE 1 MG/1
TABLET ORAL
Refills: 0 | OUTPATIENT
Start: 2023-08-21

## 2023-08-22 DIAGNOSIS — E11.65 TYPE 2 DIABETES MELLITUS WITH HYPERGLYCEMIA, WITHOUT LONG-TERM CURRENT USE OF INSULIN (HCC): ICD-10-CM

## 2023-08-22 RX ORDER — GLIPIZIDE 2.5 MG/1
2.5 TABLET, EXTENDED RELEASE ORAL 2 TIMES DAILY
Qty: 180 TABLET | Refills: 0 | Status: SHIPPED | OUTPATIENT
Start: 2023-08-22 | End: 2024-08-16

## 2023-08-22 NOTE — TELEPHONE ENCOUNTER
Pt is requesting a new order be sent to the pharmacy. She is completely out. Pt states she is taking harrison daily please review.

## 2023-09-19 ENCOUNTER — HOSPITAL ENCOUNTER (OUTPATIENT)
Dept: ULTRASOUND IMAGING | Facility: HOSPITAL | Age: 63
Discharge: HOME | End: 2023-09-19
Payer: COMMERCIAL

## 2023-09-19 DIAGNOSIS — K11.8 SUBMANDIBULAR GLAND MASS: ICD-10-CM

## 2023-09-19 DIAGNOSIS — E04.1 THYROID NODULE: ICD-10-CM

## 2023-09-19 PROCEDURE — 76536 US EXAM OF HEAD AND NECK: CPT

## 2023-09-30 DIAGNOSIS — E11.65 TYPE 2 DIABETES MELLITUS WITH HYPERGLYCEMIA, WITHOUT LONG-TERM CURRENT USE OF INSULIN (HCC): ICD-10-CM

## 2023-10-02 RX ORDER — GLIPIZIDE 2.5 MG/1
2.5 TABLET, EXTENDED RELEASE ORAL 2 TIMES DAILY
Qty: 180 TABLET | Refills: 0 | Status: SHIPPED | OUTPATIENT
Start: 2023-10-02

## 2023-10-04 DIAGNOSIS — E11.65 TYPE 2 DIABETES MELLITUS WITH HYPERGLYCEMIA, WITHOUT LONG-TERM CURRENT USE OF INSULIN (HCC): ICD-10-CM

## 2023-10-04 RX ORDER — BLOOD-GLUCOSE SENSOR
EACH MISCELLANEOUS
Qty: 6 EACH | Refills: 1 | Status: SHIPPED | OUTPATIENT
Start: 2023-10-04

## 2023-11-02 DIAGNOSIS — E11.65 TYPE 2 DIABETES MELLITUS WITH HYPERGLYCEMIA, WITHOUT LONG-TERM CURRENT USE OF INSULIN (HCC): ICD-10-CM

## 2023-11-22 ENCOUNTER — HOSPITAL ENCOUNTER (OUTPATIENT)
Dept: RADIOLOGY | Facility: HOSPITAL | Age: 63
Discharge: HOME | End: 2023-11-22

## 2023-11-29 ENCOUNTER — TELEPHONE (OUTPATIENT)
Dept: RADIOLOGY | Facility: HOSPITAL | Age: 63
End: 2023-11-29

## 2023-11-29 NOTE — NURSING NOTE
Call placed to pt to discuss upcoming appointment at [facility] and consultation completed. Pt is having a R Lymph Node Biopsy and Thyroid Biopsy with Molecular Testing completed on 12/1/2023. Allergies reviewed and verified pt does not currently take any anticoagulant medications. Pre procedure instructions including diet and taking own medications discussed with pt. Pt instructed that she may eat normally and take medications as usual before the procedure. Pt verbalized understanding of instructions given. Reminded pt of the location, date and time of procedure. My number was given to call if any questions or concerns arise pre or post procedure.

## 2023-12-01 ENCOUNTER — HOSPITAL ENCOUNTER (OUTPATIENT)
Dept: ULTRASOUND IMAGING | Facility: HOSPITAL | Age: 63
Discharge: HOME | End: 2023-12-01
Payer: COMMERCIAL

## 2023-12-01 DIAGNOSIS — E04.1 THYROID NODULE: ICD-10-CM

## 2023-12-01 DIAGNOSIS — E11.65 TYPE 2 DIABETES MELLITUS WITH HYPERGLYCEMIA, WITHOUT LONG-TERM CURRENT USE OF INSULIN (HCC): ICD-10-CM

## 2023-12-01 DIAGNOSIS — K11.8 SUBMANDIBULAR GLAND MASS: ICD-10-CM

## 2023-12-01 PROCEDURE — 88173 CYTOPATH EVAL FNA REPORT: CPT | Performed by: STUDENT IN AN ORGANIZED HEALTH CARE EDUCATION/TRAINING PROGRAM

## 2023-12-01 PROCEDURE — 88184 FLOWCYTOMETRY/ TC 1 MARKER: CPT | Performed by: PHYSICIAN ASSISTANT

## 2023-12-01 PROCEDURE — 88185 FLOWCYTOMETRY/TC ADD-ON: CPT

## 2023-12-01 PROCEDURE — 10005 FNA BX W/US GDN 1ST LES: CPT

## 2023-12-01 PROCEDURE — 38505 NEEDLE BIOPSY LYMPH NODES: CPT

## 2023-12-01 RX ORDER — GLIPIZIDE 2.5 MG/1
2.5 TABLET, EXTENDED RELEASE ORAL 2 TIMES DAILY
Qty: 180 TABLET | Refills: 0 | Status: SHIPPED | OUTPATIENT
Start: 2023-12-01

## 2023-12-01 RX ORDER — LIDOCAINE HYDROCHLORIDE 10 MG/ML
5 INJECTION, SOLUTION EPIDURAL; INFILTRATION; INTRACAUDAL; PERINEURAL ONCE
Status: COMPLETED | OUTPATIENT
Start: 2023-12-01 | End: 2023-12-01

## 2023-12-01 RX ADMIN — LIDOCAINE HYDROCHLORIDE 5 ML: 10 INJECTION, SOLUTION EPIDURAL; INFILTRATION; INTRACAUDAL; PERINEURAL at 12:55

## 2023-12-05 LAB — SCAN RESULT: NORMAL

## 2024-01-30 DIAGNOSIS — E11.65 UNCONTROLLED TYPE 2 DIABETES MELLITUS WITH HYPERGLYCEMIA (HCC): ICD-10-CM

## 2024-01-30 RX ORDER — METFORMIN HYDROCHLORIDE 500 MG/1
TABLET, EXTENDED RELEASE ORAL
Qty: 360 TABLET | Refills: 2 | Status: SHIPPED | OUTPATIENT
Start: 2024-01-30

## 2024-02-01 LAB
CREAT ?TM UR-SCNC: 55.5 UMOL/L
EXT ALBUMIN URINE RANDOM: 13.9
HBA1C MFR BLD HPLC: 7.7 %
MICROALBUMIN/CREAT UR: 25 MG/G{CREAT}

## 2024-02-26 DIAGNOSIS — E11.65 TYPE 2 DIABETES MELLITUS WITH HYPERGLYCEMIA, WITHOUT LONG-TERM CURRENT USE OF INSULIN (HCC): ICD-10-CM

## 2024-02-26 RX ORDER — DAPAGLIFLOZIN 10 MG/1
10 TABLET, FILM COATED ORAL DAILY
Qty: 30 TABLET | Refills: 3 | OUTPATIENT
Start: 2024-02-26

## 2024-02-26 NOTE — TELEPHONE ENCOUNTER
Patient needs appt for more refills. When appt has been made, let me know when appt is and will rx supply to last until appt.

## 2024-03-03 RX ORDER — DAPAGLIFLOZIN 10 MG/1
10 TABLET, FILM COATED ORAL DAILY
Qty: 30 TABLET | Refills: 1 | Status: SHIPPED | OUTPATIENT
Start: 2024-03-03

## 2024-03-14 DIAGNOSIS — G43.009 MIGRAINE WITHOUT AURA AND WITHOUT STATUS MIGRAINOSUS, NOT INTRACTABLE: ICD-10-CM

## 2024-03-14 RX ORDER — AMITRIPTYLINE HYDROCHLORIDE 10 MG/1
10 TABLET, FILM COATED ORAL
Qty: 30 TABLET | Refills: 6 | Status: SHIPPED | OUTPATIENT
Start: 2024-03-14

## 2024-03-25 ENCOUNTER — OFFICE VISIT (OUTPATIENT)
Dept: GASTROENTEROLOGY | Facility: CLINIC | Age: 64
End: 2024-03-25
Payer: COMMERCIAL

## 2024-03-25 ENCOUNTER — TELEPHONE (OUTPATIENT)
Dept: NEUROLOGY | Facility: CLINIC | Age: 64
End: 2024-03-25

## 2024-03-25 VITALS
SYSTOLIC BLOOD PRESSURE: 121 MMHG | BODY MASS INDEX: 43.32 KG/M2 | HEIGHT: 65 IN | HEART RATE: 75 BPM | DIASTOLIC BLOOD PRESSURE: 79 MMHG | WEIGHT: 260 LBS

## 2024-03-25 DIAGNOSIS — K21.9 GASTROESOPHAGEAL REFLUX DISEASE, UNSPECIFIED WHETHER ESOPHAGITIS PRESENT: Primary | ICD-10-CM

## 2024-03-25 DIAGNOSIS — Z12.11 SCREENING FOR COLON CANCER: ICD-10-CM

## 2024-03-25 DIAGNOSIS — E11.65 TYPE 2 DIABETES MELLITUS WITH HYPERGLYCEMIA, WITHOUT LONG-TERM CURRENT USE OF INSULIN (HCC): ICD-10-CM

## 2024-03-25 DIAGNOSIS — Z90.3 H/O GASTRIC SLEEVE: ICD-10-CM

## 2024-03-25 PROCEDURE — 99203 OFFICE O/P NEW LOW 30 MIN: CPT | Performed by: NURSE PRACTITIONER

## 2024-03-25 RX ORDER — GLIPIZIDE 2.5 MG/1
2.5 TABLET, EXTENDED RELEASE ORAL 2 TIMES DAILY
Qty: 180 TABLET | Refills: 0 | Status: SHIPPED | OUTPATIENT
Start: 2024-03-25

## 2024-03-25 RX ORDER — BLOOD-GLUCOSE SENSOR
EACH MISCELLANEOUS
Qty: 6 EACH | Refills: 0 | Status: SHIPPED | OUTPATIENT
Start: 2024-03-25

## 2024-03-25 RX ORDER — BETAMETHASONE DIPROPIONATE 0.5 MG/G
LOTION TOPICAL 2 TIMES DAILY
COMMUNITY
Start: 2024-01-31

## 2024-03-25 NOTE — PROGRESS NOTES
[facility] [place] - Outpatient Consultation  Zuly Queen 63 y.o. female MRN: [id number]  Encounter: [id number]          ASSESSMENT AND PLAN:      1. Gastroesophageal reflux disease, unspecified whether esophagitis present  2. H/O gastric sleeve  Patient with history of gastric sleeve performed 7 to 8 years ago at [facility] and GERD well-controlled on Prevacid 15 mg daily.  She has not had any endoscopy performed since her gastric sleeve surgery, so we will schedule this with colonoscopy for [place]'s surveillance.  Prep and procedure explained.  -     EGD; Future; Expected date: 03/25/2024    3. Screening for colon cancer  Patient reports last colonoscopy 7 or 8 years ago at [facility] with no polyps removed per her recollection, however now due for surveillance.  She denies any family history of colon cancer.  Colonoscopy scheduled.  Prep and procedure explained.  Will use over-the-counter prep per her preference and additionally asked patient to take 1 capful of MiraLAX daily 1 week prior to the colonoscopy to ensure good prep.  -     Colonoscopy; Future; Expected date: 03/25/2024        ______________________________________________________________________    HPI:  Zuly Queen is a 63 y.o. female with PMH of diabetes, CKD 2, HLD, HTN, gastric sleeve, GERD presenting to establish care for colon cancer screening.  She reports her last colonoscopy was done at [facility] 7 or 8 years ago and she is now due for surveillance.  She does not recall any polyps being removed last colonoscopy.  Denies any family history of colon cancer.  Denies any changes in her bowel habits.  She does report irregular bowel habits with some constipation and some diarrhea at times.  Denies any unintended weight loss, abdominal pain, rectal bleeding.  She has a history of gastric sleeve 7 or 8 years ago as well and GERD which is well-controlled on Prilosec 15 mg daily.  She denies any dysphagia/odynophagia,  Last Appointment:  8/19/2020  Future Appointments   Date Time Provider Shanda Lizarraga   12/9/2020  3:30 PM Nolvia Ferrer  [facility]   2/16/2021  4:00 PM Nolvia Ferrer MD [facility]   5/24/2021  4:00 PM RAEHEL Chong - CNP [facility]     Refill requested nausea/vomiting.  Her last EGD was prior to her bariatric surgery and she denies any abnormalities being found.      REVIEW OF SYSTEMS:    CONSTITUTIONAL: Denies any fever, chills, rigors, and weight loss.  HEENT: No earache or tinnitus.  CARDIOVASCULAR: No chest pain or palpitations.   RESPIRATORY: Denies any cough, hemoptysis, shortness of breath or dyspnea on exertion.  GASTROINTESTINAL: As noted in the History of Present Illness.   GENITOURINARY: Denies any hematuria or dysuria.  NEUROLOGIC: No dizziness or vertigo.   MUSCULOSKELETAL: Denies any joint swellings.  SKIN: Denies skin rashes or itching.   ENDOCRINE: Denies excessive thirst. Denies intolerance to heat or cold.  PSYCHOSOCIAL: Denies depression or anxiety. Denies any recent memory loss.       Historical Information   Past Medical History:   Diagnosis Date    Acid reflux     CKD (chronic kidney disease), stage II     Diabetes mellitus (HCC)     Dizziness     Essential hypertension     Hyperlipidemia     Hypertension     Type 2 diabetes mellitus (HCC)      Past Surgical History:   Procedure Laterality Date    ANKLE SURGERY  Nov 2019    ARTHROSCOPIC REPAIR ACL Right     knee    BARIATRIC SURGERY      gastric sleeve    GALLBLADDER SURGERY      SKIN BIOPSY      scalp    TENDON REPAIR Left     ankle    TONSILLECTOMY      US GUIDED LYMPH NODE BIOPSY RIGHT  12/1/2023    US GUIDED THYROID BIOPSY  12/1/2023     Social History   Social History     Substance and Sexual Activity   Alcohol Use Yes    Alcohol/week: 5.0 standard drinks of alcohol    Types: 5 Glasses of wine per week    Comment: occ.      Social History     Substance and Sexual Activity   Drug Use Never     Social History     Tobacco Use   Smoking Status Never   Smokeless Tobacco Never     Family History   Problem Relation Age of Onset    Hypertension Mother     Diabetes type II Father     Diabetes Father     Diabetes type II Paternal Grandmother     Cancer Paternal Grandmother     Breast cancer  "Paternal Grandmother 70    Breast cancer Paternal Aunt 68       Meds/Allergies       Current Outpatient Medications:     amitriptyline (ELAVIL) 10 mg tablet    betamethasone dipropionate 0.05 % lotion    Biotin 1000 MCG CHEW    Calcium 500 MG tablet    Continuous Blood Gluc  (FREESTYLE FRANKI 14 DAY READER) SUZY    Continuous Blood Gluc Sensor (FreeStyle Franki 14 Day Sensor) MISC    Continuous Blood Gluc Sensor (FreeStyle Franki 3 Sensor) MISC    DULoxetine (CYMBALTA) 30 mg delayed release capsule    Farxiga 10 MG tablet    glipiZIDE (GLUCOTROL XL) 2.5 mg 24 hr tablet    lansoprazole (PREVACID) 15 mg capsule    lisinopril (ZESTRIL) 2.5 mg tablet    meclizine (ANTIVERT) 25 mg tablet    metFORMIN (GLUCOPHAGE-XR) 500 mg 24 hr tablet    Multiple Vitamins-Minerals (MULTIVITAMIN WITH MINERALS) tablet    nystatin (MYCOSTATIN) cream    nystatin (MYCOSTATIN) powder    ondansetron (ZOFRAN) 4 mg tablet    Probiotic Product (ALIGN) 4 MG CAPS    Rimegepant Sulfate (Nurtec) 75 MG TBDP    rosuvastatin (CRESTOR) 5 mg tablet    zolpidem (AMBIEN) 5 mg tablet    Cholecalciferol 50 MCG (2000 UT) CAPS    fluconazole (DIFLUCAN) 200 mg tablet    predniSONE 10 mg tablet    vitamin B-12 (VITAMIN B-12) 1,000 mcg tablet    Allergies   Allergen Reactions    Ampicillin Hives    Darvon [Propoxyphene] Vomiting    Lipitor [Atorvastatin] Arthralgia    Vioxx [Rofecoxib]            Objective     Blood pressure 121/79, pulse 75, height 5' 5\" (1.651 m), weight 118 kg (260 lb). Body mass index is 43.27 kg/m².        PHYSICAL EXAM:      General Appearance:   Alert, cooperative, no distress   HEENT:   Normocephalic, atraumatic, anicteric.     Neck:  Supple, symmetrical, trachea midline   Lungs:   Clear to auscultation bilaterally; no rales, rhonchi or wheezing; respirations unlabored    Heart::   Regular rate and rhythm; no murmur.   Abdomen:   Soft, non-tender, non-distended; normal bowel sounds; no masses, no organomegaly    Genitalia:   Deferred  "   Rectal:   Deferred    Extremities:  No cyanosis, clubbing or edema    Skin:  No jaundice, rashes, or lesions    Lymph nodes:  No palpable cervical lymphadenopathy        Lab Results:   No visits with results within 1 Day(s) from this visit.   Latest known visit with results is:   Orders Only on 02/01/2024   Component Date Value    Hemoglobin A1C 02/01/2024 7.7     EXT Creatinine Urine 02/01/2024 55.5     Albumin,U,Random 02/01/2024 13.9     Alb/Creat Ratio 02/01/2024 25.0          Radiology Results:   No results found.

## 2024-03-25 NOTE — TELEPHONE ENCOUNTER
Reason for call:   [x] Refill   [] Prior Auth  [] Other:     Office:   [] PCP/Provider -   [x] Specialty/Provider - endo      Medication: glipiZIDE (GLUCOTROL XL) 2.5 mg 24 hr tablet Take 1 tablet (2.5 mg total) by mouth 2 (two) times a day  #180        Continuous Blood Gluc Sensor (FreeStyle Ankit 3 Sensor) MISC CHANGE 1 SENSOR EVERY 14 DAYS   #6        Pharmacy: [facility]/pharmacy #[id number] - [place] MUR[place] - [address] [phone]     Does the patient have enough for 3 days?   [] Yes   [x] No - Send as HP to POD

## 2024-03-25 NOTE — PATIENT INSTRUCTIONS
High Fiber Diet   WHAT YOU NEED TO KNOW:   A high-fiber diet includes foods that have a high amount of fiber. Fiber is the part of fruits, vegetables, and grains that is not broken down by your body. Fiber keeps your bowel movements regular. Fiber can also help lower your cholesterol level, control blood sugar in people with diabetes, and relieve constipation. Fiber can also help you control your weight because it helps you feel full faster. Most adults should eat 25 to 35 grams of fiber each day. Talk to your dietitian or healthcare provider about the amount of fiber you need.  DISCHARGE INSTRUCTIONS:   Good sources of fiber:       Foods with at least 4 grams of fiber per serving:      ? to ½ cup of high-fiber cereal (check the nutrition label on the box)    ½ cup of blackberries or raspberries    4 dried prunes    1 cooked artichoke    ½ cup of cooked legumes, such as lentils, or red, kidney, and figueroa beans    Foods with 1 to 3 grams of fiber per servin slice of whole-wheat, pumpernickel, or rye bread    ½ cup of cooked brown rice    4 whole-wheat crackers    1 cup of oatmeal    ½ cup of cereal with 1 to 3 grams of fiber per serving (check the nutrition label on the box)    1 small piece of fruit, such as an apple, banana, pear, kiwi, or orange    3 dates    ½ cup of canned apricots, fruit cocktail, peaches, or pears    ½ cup of raw or cooked vegetables, such as carrots, cauliflower, cabbage, spinach, squash, or corn  Ways that you can increase fiber in your diet:   Choose brown or wild rice instead of white rice.     Use whole wheat flour in recipes instead of white or all-purpose flour.     Add beans and peas to casseroles or soups.     Choose fresh fruit and vegetables with peels or skins on instead of juices.    Other diet guidelines to follow:   Add fiber to your diet slowly.  You may have abdominal discomfort, bloating, and gas if you add fiber to your diet too quickly.     Drink plenty of  liquids as you add fiber to your diet.  You may have nausea or develop constipation if you do not drink enough water. Ask how much liquid to drink each day and which liquids are best for you.    © Copyright [employer] 2023 Information is for End User's use only and may not be sold, redistributed or otherwise used for commercial purposes.  The above information is an  only. It is not intended as medical advice for individual conditions or treatments. Talk to your doctor, nurse or pharmacist before following any medical regimen to see if it is safe and effective for you.      GERD (Gastroesophageal Reflux Disease)   WHAT YOU NEED TO KNOW:   Gastroesophageal reflux disease (GERD) is reflux that happens more than 2 times a week for a few weeks. Reflux means acid and food in your stomach back up into your esophagus. GERD can cause other health problems over time if it is not treated.       DISCHARGE INSTRUCTIONS:   Call your local emergency number (911 in the ) if:   You have severe chest pain and sudden trouble breathing.      Return to the emergency department if:   You have trouble breathing after you vomit.    You have trouble swallowing, or pain with swallowing.    Your bowel movements are black, bloody, or tarry-looking.    Your vomit looks like coffee grounds or has blood in it.    Call your doctor or gastroenterologist if:   You feel full and cannot burp or vomit.    You vomit large amounts, or you vomit often.    You are losing weight without trying.    Your symptoms get worse or do not improve with treatment.    You have questions or concerns about your condition or care.    Medicines:   Medicines  are used to decrease stomach acid. Medicine may also be used to help your lower esophageal sphincter and stomach contract (tighten) more.    Take your medicine as directed.  Contact your healthcare provider if you think your medicine is not helping or if you have side effects. Tell your provider if you  are allergic to any medicine. Keep a list of the medicines, vitamins, and herbs you take. Include the amounts, and when and why you take them. Bring the list or the pill bottles to follow-up visits. Carry your medicine list with you in case of an emergency.    Manage GERD:       Do not have foods or drinks that may increase heartburn.  These include chocolate, peppermint, fried or fatty foods, drinks that contain caffeine, or carbonated drinks (soda). Other foods include spicy foods, onions, tomatoes, and tomato-based foods. Do not have foods or drinks that can irritate your esophagus, such as citrus fruits, juices, and alcohol.    Do not eat large meals.  When you eat a lot of food at one time, your stomach needs more acid to digest it. Eat 6 small meals each day instead of 3 large ones, and eat slowly. Do not eat meals 2 to 3 hours before bedtime.    Elevate the head of your bed.  Place 6-inch blocks under the head of your bed frame. You may also use more than one pillow under your head and shoulders while you sleep.    Maintain a healthy weight.  If you are overweight, weight loss may help relieve symptoms of GERD.    Do not smoke.  Smoking weakens the lower esophageal sphincter and increases the risk of GERD. Ask your healthcare provider for information if you currently smoke and need help to quit. E-cigarettes or smokeless tobacco still contain nicotine. Talk to your healthcare provider before you use these products.    Do not put pressure on your abdomen.  Pressure pushes acid up into your esophagus. Do not wear clothing that is tight around your waist. Do not bend over. Bend at the knees if you need to pick something up.  Follow up with your doctor or gastroenterologist as directed:  Write down your questions so you remember to ask them during your visits.  © Copyright [employer] 2023 Information is for End User's use only and may not be sold, redistributed or otherwise used for commercial purposes.  The above  information is an  only. It is not intended as medical advice for individual conditions or treatments. Talk to your doctor, nurse or pharmacist before following any medical regimen to see if it is safe and effective for you.      Scheduled date of EGD/colonoscopy (as of today): 5/24/24  Physician performing EGD/colonoscopy: Dr. Salazar  Location of EGD/colonoscopy: [facility] bowel prep reviewed with patient: Miralax  Instructions reviewed with patient by: Nadiya ALFONSO  Clearances: diabetic-farxiga 4 day hold  Please take 1 capful of MiraLax daily for 1 week prior to procedure.

## 2024-04-02 ENCOUNTER — OFFICE VISIT (OUTPATIENT)
Dept: NEUROLOGY | Facility: CLINIC | Age: 64
End: 2024-04-02
Payer: COMMERCIAL

## 2024-04-02 VITALS
BODY MASS INDEX: 44.33 KG/M2 | OXYGEN SATURATION: 98 % | SYSTOLIC BLOOD PRESSURE: 118 MMHG | HEART RATE: 75 BPM | WEIGHT: 266.4 LBS | DIASTOLIC BLOOD PRESSURE: 76 MMHG | TEMPERATURE: 97.3 F

## 2024-04-02 DIAGNOSIS — G43.009 MIGRAINE WITHOUT AURA AND WITHOUT STATUS MIGRAINOSUS, NOT INTRACTABLE: Primary | ICD-10-CM

## 2024-04-02 PROCEDURE — 99212 OFFICE O/P EST SF 10 MIN: CPT

## 2024-04-02 RX ORDER — RIMEGEPANT SULFATE 75 MG/75MG
TABLET, ORALLY DISINTEGRATING ORAL
Qty: 8 TABLET | Refills: 3 | Status: SHIPPED | OUTPATIENT
Start: 2024-04-02

## 2024-04-02 NOTE — PROGRESS NOTES
[facility]  PATIENT:  Zuly Queen  MRN:  [id number]  :  1960  DATE OF SERVICE:  2024      Assessment/Plan:     Migraine without aura without status migrainosus:    I had the pleasure of seeing Zuly today in the office at [facility] in [place].  She is presenting for an office visit follow-up evaluation regard to her migraine headaches.  The patient was previously being seen by Dr. Pedroza and her last appointment was in 2023.  The patient states that since decreasing her amitriptyline to 10 mg daily at bedtime she is still noticing significant improvement in regards to her migraine headaches. She states that at this moment in time she will have 1 migraine headache a month, if they are even occurring that frequently.  We did discuss the possibility of completely coming off of the amitriptyline, however the patient was much more comfortable with staying on a daily low dosage at bedtime. She notes that since the last appointment there is only been possibly 2 occasions where she had to take the Nurtec.  Usually the Nurtec will work about after an hour of taking the medication and overall seems to be decreasing the headache to a more tolerable level.  The patient states that at this time she is still not opted to do a CPAP for mild LLOYD.  She is going to continue on with her weight loss journey at this moment in time instead.  The patient is perfectly fine to continue with the same medications for preventative and abortive therapy at this visit.  No other issues or concerns, she can follow-up with Dr. Pedroza in 6 months time.      Patient Instructions:    Headache Calendar  Please maintain a headache calendar  Consider using phone applications such as Migraine Blu or Migraine Diary    Headache/migraine treatment:     Rescue medications (for immediate treatment of a headache):   It is ok to take ibuprofen, acetaminophen or naproxen (Advil, Tylenol,   Aleve, Excedrin) if they help your headaches you should limit these to No more than 3 times a week to avoid medication overuse/rebound headaches.     For your more moderate to severe migraines take this medication early   - Continue Nurtec 75 mg, take 1 tablet at the onset of a migraine headache.  Max dose: 75 mg per 24 hours    Prescription preventive medications for headaches/migraines   (to take every day to help prevent headaches - not to take at the time of headache):  - Continue amitriptyline 10 mg, take 1 tablet by mouth once daily at bedtime for migraine prevention.    *Typically these types of medications take time until you see the benefit, although some may see improvement in days, often it may take weeks, especially if the medication is being titrated up to a beneficial level. Please contact us if there are any concerns or questions regarding the medication.     Over the counter preventive supplements for headaches/migraines (if you try, try for 3 months straight)  (to take every day to help prevent headaches - not to take at the time of headache):  There are combo pills online of these - none of which regulated by FDA and double check dosing - take appropriate dose only once a day- prevent a migraine, migravent, mind ease, migrelief   [] Magnesium 400mg daily (If any diarrhea or upset stomach, decrease dose  as tolerated)  [] Riboflavin (Vitamin B2) 400mg daily (may make your urine bright/neon yellow)  [] Vitamin E which may help with menstrual migraine. There is limited data on this, but it may reduce nausea, photophobia and phonophobia during menstruation.   - All supplements can be purchased online    Sleep and headache prevention:   [] Melatonin - you may take 1-3 mg nightly for sleep or headache prevention. You should take this 1 hour prior to bedtime consistently every night for it to work. It works by gradually helping to adjust your sleep time over days to weeks, rather than immediately making  you feel sleepy.      Lifestyle Recommendations:  [x] SLEEP - Maintain a regular sleep schedule: Adults need at least 7-8 hours of uninterrupted a night. Maintain good sleep hygiene:  Going to bed and waking up at consistent times, avoiding excessive daytime naps, avoiding caffeinated beverages in the evening, avoid excessive stimulation in the evening and generally using bed primarily for sleeping.  One hour before bedtime would recommend turning lights down lower, decreasing your activity (may read quietly, listen to music at a low volume). When you get into bed, should eliminate all technology (no texting, emailing, playing with your phone, iPad or tablet in bed).  [x] HYDRATION - Maintain good hydration.  Drink  2L of fluid a day (4 typical small water bottles)  [x] DIET - Maintain good nutrition. In particular don't skip meals and try and eat healthy balanced meals regularly.  [x] TRIGGERS - Look for other triggers and avoid them: Limit caffeine to 1-2 cups a day or less. Avoid dietary triggers that you have noticed bring on your headaches (this could include aged cheese, peanuts, MSG, aspartame and nitrates).  [x] EXERCISE - physical exercise as we all know is good for you in many ways, and not only is good for your heart, but also is beneficial for your mental health, cognitive health and  chronic pain/headaches. I would encourage at the least 5 days of physical exercise weekly for at least 30 minutes.     Education and Follow-up  [x] Please call with any questions or concerns. Of course if any new concerning symptoms go to the emergency department.  [x] Follow up in 6 months with Dr. Pedroza        History of Present Illness:     For Review:    We had the pleasure of evaluating Zuly Queen in neurological follow up  today for headaches.  As you know,  she is a 63 y.o.   female with a past history of migraine headaches. Patient was last seen in the office at [facility] on 08/02/2023 by   Yovany.  At the last visit the patient noted that she was only having about 1 significant headache day per month.  She was interested at the last visit on cutting back her amitriptyline for preventative therapy.  The patient was going to cut back to 10 mg daily at bedtime to see if this would continue to help for preventative therapy moving forward.  Patient was also to continue Nurtec for abortive therapy of her headaches as well.  Also, treatment of sleep apnea was discussed.  It was noted that the patient was interested in trying alternative measures for sleep apnea rather than her CPAP.  It is encouraged that the patient reach out to sleep medicine and look into what potential other options for sleep apnea were possible.        Current medical illnesses: DM2, HTN, HLD, vertigo, obesity      What medications do you take or have you taken for your headaches?   Current Preventive:   Amitriptyline 10 mg at bedtime Cymbalta, lisinopril,  Current Abortive:   Nurtec    Prior Preventive:   None  Prior Abortive:   None    Interval updates as of 4/2/2024:  She got new glasses since the last appointment, pressure behind eyes seem to resolve. Amitriptyline is working for prevention since decreasing. Usually about an hour after taking Nurtec, she will start to feel better with the migraine. Decreasing the headache to a tolerable level with the Nurtec. Does have mild sleep apnea, did opt not to do a CPAP at this time because it was mild. Has been working trying to to lose weight as well to help decrease sleep apnea. 1 headache day a month if that since the last visit, has only needed to use the Nurtec twice since the last time. 7/10 pain at the typical onset of a migraine headache she states.     Reviewed old notes from physician seen in the past- see above HPI for summary of previous encounters.       Past Medical History:   Diagnosis Date    Acid reflux     CKD (chronic kidney disease), stage II     Diabetes mellitus (HCC)      Dizziness     Essential hypertension     Hyperlipidemia     Hypertension     Type 2 diabetes mellitus ([facility])        Patient Active Problem List   Diagnosis    Type 2 diabetes mellitus (HCC)    Essential hypertension    Mixed hyperlipidemia    Type 2 diabetes mellitus with hyperglycemia, without long-term current use of insulin ([facility])    Vertigo    Tinnitus of left ear    Numbness and tingling of left side of face    Class 3 severe obesity in adult ([facility])    Thyroid nodule incidentally noted on imaging study    Obstructive sleep apnea-hypopnea syndrome    Headache       Medications:      Current Outpatient Medications   Medication Sig Dispense Refill    amitriptyline (ELAVIL) 10 mg tablet TAKE 1 TABLET BY MOUTH DAILY AT BEDTIME 30 tablet 6    betamethasone dipropionate 0.05 % lotion Apply topically 2 (two) times a day To affected area      Biotin 1000 MCG CHEW Chew 6,000 mcg      Calcium 500 MG tablet Take by mouth      Continuous Blood Gluc  (FREESTYLE FRANKI 14 DAY READER) SUZY by Does not apply route      Continuous Blood Gluc Sensor (FreeStyle Franki 14 Day Sensor) MISC Use 1 Device every 14 (fourteen) days 6 each 0    Continuous Blood Gluc Sensor (FreeStyle Franki 3 Sensor) MISC CHANGE 1 SENSOR EVERY 14 DAYS 6 each 0    DULoxetine (CYMBALTA) 30 mg delayed release capsule Take 30 mg by mouth daily      Farxiga 10 MG tablet Take 1 tablet (10 mg total) by mouth daily 30 tablet 1    glipiZIDE (GLUCOTROL XL) 2.5 mg 24 hr tablet Take 1 tablet (2.5 mg total) by mouth 2 (two) times a day 180 tablet 0    lansoprazole (PREVACID) 15 mg capsule Take 15 mg by mouth daily      lisinopril (ZESTRIL) 2.5 mg tablet Take 2.5 mg by mouth daily      meclizine (ANTIVERT) 25 mg tablet Take 1 tablet (25 mg total) by mouth 3 (three) times a day as needed for dizziness 30 tablet 0    metFORMIN (GLUCOPHAGE-XR) 500 mg 24 hr tablet TAKE 2 TABLETS BY MOUTH TWICE A DAY WITH FOOD 360 tablet 2    Multiple Vitamins-Minerals (MULTIVITAMIN  WITH MINERALS) tablet Take 1 tablet by mouth daily      nystatin (MYCOSTATIN) cream Apply topically as needed      nystatin (MYCOSTATIN) powder       ondansetron (ZOFRAN) 4 mg tablet Take 1 tablet (4 mg total) by mouth every 6 (six) hours 12 tablet 0    Probiotic Product (ALIGN) 4 MG CAPS Take by mouth      Rimegepant Sulfate (Nurtec) 75 MG TBDP Take one NURTEC 75 mg at onset under tongue. Limit 1 in 24 hours. 8 a month. 8 tablet 3    rosuvastatin (CRESTOR) 5 mg tablet Take 5 mg by mouth daily      zolpidem (AMBIEN) 5 mg tablet Take 0.5 tablets (2.5 mg total) by mouth daily at bedtime as needed 30 tablet     Cholecalciferol 50 MCG (2000 UT) CAPS Take 500 Units by mouth (Patient not taking: Reported on 3/25/2024)      fluconazole (DIFLUCAN) 200 mg tablet  (Patient not taking: Reported on 3/25/2024)      predniSONE 10 mg tablet Take 30 mg daily for 3 days. Then take 20 mg daily for 3 days. Then take 10 mg daily for 4 days. Then stop. (Patient not taking: Reported on 3/25/2024) 19 tablet 0    vitamin B-12 (VITAMIN B-12) 1,000 mcg tablet Take 5,000 mcg by mouth daily Takes 1 daily (Patient not taking: Reported on 3/25/2024)       No current facility-administered medications for this visit.        Allergies:      Allergies   Allergen Reactions    Ampicillin Hives    Darvon [Propoxyphene] Vomiting    Lipitor [Atorvastatin] Arthralgia    Vioxx [Rofecoxib]        Family History:     Family History   Problem Relation Age of Onset    Hypertension Mother     Diabetes type II Father     Diabetes Father     Diabetes type II Paternal Grandmother     Cancer Paternal Grandmother     Breast cancer Paternal Grandmother 70    Breast cancer Paternal Aunt 68       Social History:     Social History     Socioeconomic History    Marital status: /Civil Union     Spouse name: Not on file    Number of children: Not on file    Years of education: Not on file    Highest education level: Not on file   Occupational History    Not on file    Tobacco Use    Smoking status: Never    Smokeless tobacco: Never   Vaping Use    Vaping status: Never Used   Substance and Sexual Activity    Alcohol use: Yes     Alcohol/week: 5.0 standard drinks of alcohol     Types: 5 Glasses of wine per week     Comment: occ.     Drug use: Never    Sexual activity: Not Currently     Partners: Male     Birth control/protection: None   Other Topics Concern    Not on file   Social History Narrative    Not on file     Social Determinants of Health     Financial Resource Strain: Not on file   Food Insecurity: Not on file   Transportation Needs: Not on file   Physical Activity: Not on file   Stress: Not on file   Social Connections: Not on file   Intimate Partner Violence: Not on file   Housing Stability: Not on file         Objective:     Physical Exam:                                                                 Vitals:            Constitutional:    /76 (BP Location: Left arm, Patient Position: Sitting, Cuff Size: Adult)   Pulse 75   Temp (!) 97.3 °F (36.3 °C) (Temporal)   Wt 121 kg (266 lb 6.4 oz)   SpO2 98%   BMI 44.33 kg/m²   BP Readings from Last 3 Encounters:   04/02/24 118/76   03/25/24 121/79   08/02/23 122/86     Pulse Readings from Last 3 Encounters:   04/02/24 75   03/25/24 75   08/02/23 77         Well developed, well nourished, well groomed. No dysmorphic features.       Psychiatric:  Normal behavior and appropriate affect        Neurological Examination:     Mental status/cognitive function:   Orientated to time, place and person. Recent and remote memory intact. Attention span and concentration as well as fund of knowledge are appropriate for age. Normal language and spontaneous speech.     Cranial Nerves:  II-visual fields full.   III, IV, VI-Pupils were equal, round, and reactive to light and accomodation. Extraocular movements were full and conjugate without nystagmus. Conjugate gaze, normal smooth pursuits, normal saccades   V-facial sensation  symmetric.    VII-facial expression symmetric, intact forehead wrinkle, strong eye closure, symmetric smile    VIII-hearing grossly intact bilaterally   IX, X-palate elevation symmetric, no dysarthria.   XI-shoulder shrug strength intact    XII-tongue protrusion midline.    Motor Exam: symmetric bulk and tone throughout, no pronator drift. Power/strength 5/5 bilateral upper and lower extremities, no atrophy, fasciculations or abnormal movements noted.   Sensory: grossly intact light touch in all extremities.   Reflexes: brachioradialis 2+, biceps 2+, knee 2+ bilaterally  Coordination: Finger nose finger intact bilaterally, no apparent dysmetria, ataxia or tremor noted  Gait: steady casual and tandem gait.       Review of Systems:     Review of Systems   Constitutional:  Negative for appetite change, fatigue and fever.   HENT: Negative.  Negative for hearing loss, tinnitus, trouble swallowing and voice change.    Eyes: Negative.  Negative for photophobia, pain and visual disturbance.   Respiratory: Negative.  Negative for shortness of breath.    Cardiovascular: Negative.  Negative for palpitations.   Gastrointestinal: Negative.  Negative for nausea and vomiting.   Endocrine: Negative.  Negative for cold intolerance.   Genitourinary: Negative.  Negative for dysuria, frequency and urgency.   Musculoskeletal:  Negative for back pain, gait problem, myalgias, neck pain and neck stiffness.   Skin: Negative.  Negative for rash.   Allergic/Immunologic: Negative.    Neurological: Negative.  Negative for dizziness, tremors, seizures, syncope, facial asymmetry, speech difficulty, weakness, light-headedness, numbness and headaches.   Hematological: Negative.  Does not bruise/bleed easily.   Psychiatric/Behavioral: Negative.  Negative for confusion, hallucinations and sleep disturbance.    All other systems reviewed and are negative.      I personally reviewed the ROS entered by the MA    I spent 15 minutes in total time for this  visit.    Author:  Lyndon Genao PA-C 4/2/2024 7:39 AM

## 2024-04-02 NOTE — PATIENT INSTRUCTIONS
Patient Instructions:    Headache Calendar  Please maintain a headache calendar  Consider using phone applications such as Migraine Blu or Migraine Diary    Headache/migraine treatment:     Rescue medications (for immediate treatment of a headache):   It is ok to take ibuprofen, acetaminophen or naproxen (Advil, Tylenol,  Aleve, Excedrin) if they help your headaches you should limit these to No more than 3 times a week to avoid medication overuse/rebound headaches.     For your more moderate to severe migraines take this medication early   - Continue Nurtec 75 mg, take 1 tablet at the onset of a migraine headache.  Max dose: 75 mg per 24 hours    Prescription preventive medications for headaches/migraines   (to take every day to help prevent headaches - not to take at the time of headache):  - Continue amitriptyline 10 mg, take 1 tablet by mouth once daily at bedtime for migraine prevention.    *Typically these types of medications take time until you see the benefit, although some may see improvement in days, often it may take weeks, especially if the medication is being titrated up to a beneficial level. Please contact us if there are any concerns or questions regarding the medication.     Over the counter preventive supplements for headaches/migraines (if you try, try for 3 months straight)  (to take every day to help prevent headaches - not to take at the time of headache):  There are combo pills online of these - none of which regulated by FDA and double check dosing - take appropriate dose only once a day- prevent a migraine, migravent, mind ease, migrelief   [] Magnesium 400mg daily (If any diarrhea or upset stomach, decrease dose  as tolerated)  [] Riboflavin (Vitamin B2) 400mg daily (may make your urine bright/neon yellow)  [] Vitamin E which may help with menstrual migraine. There is limited data on this, but it may reduce nausea, photophobia and phonophobia during menstruation.   - All supplements can  be purchased online    Sleep and headache prevention:   [] Melatonin - you may take 1-3 mg nightly for sleep or headache prevention. You should take this 1 hour prior to bedtime consistently every night for it to work. It works by gradually helping to adjust your sleep time over days to weeks, rather than immediately making you feel sleepy.      Lifestyle Recommendations:  [x] SLEEP - Maintain a regular sleep schedule: Adults need at least 7-8 hours of uninterrupted a night. Maintain good sleep hygiene:  Going to bed and waking up at consistent times, avoiding excessive daytime naps, avoiding caffeinated beverages in the evening, avoid excessive stimulation in the evening and generally using bed primarily for sleeping.  One hour before bedtime would recommend turning lights down lower, decreasing your activity (may read quietly, listen to music at a low volume). When you get into bed, should eliminate all technology (no texting, emailing, playing with your phone, iPad or tablet in bed).  [x] HYDRATION - Maintain good hydration.  Drink  2L of fluid a day (4 typical small water bottles)  [x] DIET - Maintain good nutrition. In particular don't skip meals and try and eat healthy balanced meals regularly.  [x] TRIGGERS - Look for other triggers and avoid them: Limit caffeine to 1-2 cups a day or less. Avoid dietary triggers that you have noticed bring on your headaches (this could include aged cheese, peanuts, MSG, aspartame and nitrates).  [x] EXERCISE - physical exercise as we all know is good for you in many ways, and not only is good for your heart, but also is beneficial for your mental health, cognitive health and  chronic pain/headaches. I would encourage at the least 5 days of physical exercise weekly for at least 30 minutes.     Education and Follow-up  [x] Please call with any questions or concerns. Of course if any new concerning symptoms go to the emergency department.  [x] Follow up in 6 months with   Yovany

## 2024-04-11 ENCOUNTER — OFFICE VISIT (OUTPATIENT)
Dept: ENDOCRINOLOGY | Facility: CLINIC | Age: 64
End: 2024-04-11
Payer: COMMERCIAL

## 2024-04-11 VITALS
HEIGHT: 65 IN | WEIGHT: 265.8 LBS | DIASTOLIC BLOOD PRESSURE: 80 MMHG | HEART RATE: 77 BPM | BODY MASS INDEX: 44.28 KG/M2 | OXYGEN SATURATION: 99 % | SYSTOLIC BLOOD PRESSURE: 122 MMHG

## 2024-04-11 DIAGNOSIS — E53.8 VITAMIN B 12 DEFICIENCY: ICD-10-CM

## 2024-04-11 DIAGNOSIS — I10 ESSENTIAL HYPERTENSION: ICD-10-CM

## 2024-04-11 DIAGNOSIS — E78.2 MIXED HYPERLIPIDEMIA: ICD-10-CM

## 2024-04-11 DIAGNOSIS — E11.65 UNCONTROLLED TYPE 2 DIABETES MELLITUS WITH HYPERGLYCEMIA (HCC): ICD-10-CM

## 2024-04-11 DIAGNOSIS — E04.1 THYROID NODULE: Primary | ICD-10-CM

## 2024-04-11 PROCEDURE — 99214 OFFICE O/P EST MOD 30 MIN: CPT | Performed by: INTERNAL MEDICINE

## 2024-04-11 PROCEDURE — 95251 CONT GLUC MNTR ANALYSIS I&R: CPT | Performed by: INTERNAL MEDICINE

## 2024-04-11 RX ORDER — ACARBOSE 25 MG/1
TABLET ORAL
Qty: 60 TABLET | Refills: 5 | Status: SHIPPED | OUTPATIENT
Start: 2024-04-11

## 2024-04-11 NOTE — PROGRESS NOTES
Zuly Queen 63 y.o. female MRN: [id number]    Encounter: [id number]      Assessment/Plan     Assessment:  This is a 63 y.o.-year-old female with diabetes with hyperglycemia.    Plan:  Diagnoses and all orders for this visit:    Thyroid nodule  Patient had a fine-needle aspiration biopsy, in December 2023 which showed benign results  Obtain ultrasound before next appointment for follow-up.    Thyroid blood work was normal.  Will obtain thyroid blood work now  -     US thyroid; Future  -     TSH + Free T4; Future    Uncontrolled type 2 diabetes mellitus with hyperglycemia (HCC)  A1c 7.7%, uncontrolled.  Goal for A1c 6.5 to 7%  Patient is currently taking glipizide extended release 2.5 mg twice a day.  As per continuous glucose monitor sensor download her blood sugars are in 200 mg per DL range during the day from 9 AM to 6 PM  Discussed to increase the glipizide XL to 5 mg with breakfast however patient is hesitant to do that as she is afraid of weight gain.  Will start acarbose 25 mg with breakfast and with lunch, discussed the side effects and benefits.  Discussed the goal for blood sugar is 80 to 180 mg per DL range  Educated about hypoglycemia symptoms and treatment  Discussed the importance of getting blood work as suggested as soon as possible in order to ensure her GFR is okay  Discussed to make follow-up appointment with ophthalmology and podiatry    -     acarbose (PRECOSE) 25 mg tablet; Take one tablet with breakfast and lunch  -     Comprehensive metabolic panel; Future  -     Basic metabolic panel; Future  -     Hemoglobin A1C; Future    Essential hypertension  Blood pressure well-controlled, continue current management as per PCP  Currently managed on lisinopril 2.5 mg daily  BP Readings from Last 3 Encounters:   04/11/24 122/80   04/02/24 118/76   03/25/24 121/79      Mixed hyperlipidemia  Lab Results   Component Value Date    LDLCALC 64 09/13/2022      Continue Crestor 5 mg daily, managed by  PCP  Vitamin B 12 deficiency  Currently not taking vitamin B12 supplementation  -     Vitamin B12; Future         CC: Diabetes    History of Present Illness     HPI:    Zuly Queen is 63-year-old female with medical history of type 2 diabetes is here for follow-up.  She has been off of the insulin since her gastric sleeve surgery.  Diabetes course has been stable.    She was last seen in the office was in May 2023.    She Uses continuous glucose monitor sensor by Dex[employer].  2 weeks ago review from March 29 to April 11, 2024 reviewed more than 72 hours of data reviewed.  94% time CGM is active.  Average glucose is 184 mg per DL, GMI 7.7%, glucose variability is 25.4%  51% blood sugars are in target range, 0% blood sugars are low, 40% blood sugars are high, 9% blood sugars are very high  Patient has pattern of elevated blood sugars from 9 AM to 6 PM    Current regimen includes metformin 1000 mg twice a day, Farxiga 10 mg daily, glipizide 2.5 mg daily.  She is compliant with medications and denies side effects.  She denies any recent hospitalization for hyperglycemia or hypoglycemia.    Patient had thyroid ultrasound in September 2023 which showed right thyroid nodule, mid gland 2 x 1.2 x 1.5 cm, highly suspicious.  Right submandibular lymph node measuring 1.3 x 0.7 x 0 1.4 cm  Pt underwent biopsy of right thyroid nodule as well as right submandibular node which showed benign results in Dec 2023   She denies exposure to radiation to the neck/face or chest area.      She has H/o pancreatitis      2/1/2024   EXT Creatinine Urine 55.5 (E)   Albumin,U,Random 13.9 (E)   Albumin Creat Ratio 25.0 (E)   Hemoglobin A1C 7.7 (E)      Lab Results   Component Value Date    LDLCALC 64 09/13/2022      BP Readings from Last 3 Encounters:   04/11/24 122/80   04/02/24 118/76   03/25/24 121/79           Review of Systems   Constitutional:  Negative for activity change, diaphoresis, fatigue, fever and unexpected weight change.   HENT:  Negative.     Eyes:  Negative for visual disturbance.   Respiratory:  Negative for cough, chest tightness and shortness of breath.    Cardiovascular:  Negative for chest pain, palpitations and leg swelling.   Gastrointestinal:  Negative for abdominal pain, blood in stool, constipation, diarrhea, nausea and vomiting.   Endocrine: Negative for cold intolerance, heat intolerance, polydipsia, polyphagia and polyuria.   Genitourinary:  Negative for dysuria, enuresis, frequency and urgency.   Musculoskeletal:  Negative for arthralgias and myalgias.   Skin:  Negative for pallor, rash and wound.   Allergic/Immunologic: Negative.    Neurological:  Negative for dizziness, tremors, weakness and numbness.   Hematological: Negative.    Psychiatric/Behavioral: Negative.         Historical Information   Past Medical History:   Diagnosis Date    Acid reflux     CKD (chronic kidney disease), stage II     Diabetes mellitus (HCC)     Dizziness     Essential hypertension     Hyperlipidemia     Hypertension     Type 2 diabetes mellitus (HCC)      Past Surgical History:   Procedure Laterality Date    ANKLE SURGERY  Nov 2019    ARTHROSCOPIC REPAIR ACL Right     knee    BARIATRIC SURGERY      gastric sleeve    GALLBLADDER SURGERY      SKIN BIOPSY      scalp    TENDON REPAIR Left     ankle    TONSILLECTOMY      US GUIDED LYMPH NODE BIOPSY RIGHT  12/1/2023    US GUIDED THYROID BIOPSY  12/1/2023     Social History   Social History     Substance and Sexual Activity   Alcohol Use Yes    Alcohol/week: 5.0 standard drinks of alcohol    Types: 5 Glasses of wine per week    Comment: occ.      Social History     Substance and Sexual Activity   Drug Use Never     Social History     Tobacco Use   Smoking Status Never   Smokeless Tobacco Never     Family History:   Family History   Problem Relation Age of Onset    Hypertension Mother     Diabetes type II Father     Diabetes Father     Diabetes type II Paternal Grandmother     Cancer Paternal  Grandmother     Breast cancer Paternal Grandmother 70    Breast cancer Paternal Aunt 68       Meds/Allergies   Current Outpatient Medications   Medication Sig Dispense Refill    acarbose (PRECOSE) 25 mg tablet Take one tablet with breakfast and lunch 60 tablet 5    amitriptyline (ELAVIL) 10 mg tablet TAKE 1 TABLET BY MOUTH DAILY AT BEDTIME 30 tablet 6    betamethasone dipropionate 0.05 % lotion Apply topically 2 (two) times a day To affected area      Biotin 1000 MCG CHEW Chew 6,000 mcg      Calcium 500 MG tablet Take by mouth      Continuous Blood Gluc  (FREESTYLE FRANKI 14 DAY READER) SUZY by Does not apply route      Continuous Blood Gluc Sensor (FreeStyle Franki 14 Day Sensor) MISC Use 1 Device every 14 (fourteen) days 6 each 0    Continuous Blood Gluc Sensor (FreeStyle Franki 3 Sensor) MISC CHANGE 1 SENSOR EVERY 14 DAYS 6 each 0    DULoxetine (CYMBALTA) 30 mg delayed release capsule Take 30 mg by mouth daily      Farxiga 10 MG tablet Take 1 tablet (10 mg total) by mouth daily 30 tablet 1    glipiZIDE (GLUCOTROL XL) 2.5 mg 24 hr tablet Take 1 tablet (2.5 mg total) by mouth 2 (two) times a day 180 tablet 0    lansoprazole (PREVACID) 15 mg capsule Take 15 mg by mouth daily      lisinopril (ZESTRIL) 2.5 mg tablet Take 2.5 mg by mouth daily      meclizine (ANTIVERT) 25 mg tablet Take 1 tablet (25 mg total) by mouth 3 (three) times a day as needed for dizziness 30 tablet 0    metFORMIN (GLUCOPHAGE-XR) 500 mg 24 hr tablet TAKE 2 TABLETS BY MOUTH TWICE A DAY WITH FOOD 360 tablet 2    Multiple Vitamins-Minerals (MULTIVITAMIN WITH MINERALS) tablet Take 1 tablet by mouth daily      nystatin (MYCOSTATIN) cream Apply topically as needed      nystatin (MYCOSTATIN) powder       ondansetron (ZOFRAN) 4 mg tablet Take 1 tablet (4 mg total) by mouth every 6 (six) hours 12 tablet 0    Probiotic Product (ALIGN) 4 MG CAPS Take by mouth      rimegepant sulfate (Nurtec) 75 mg TBDP Take one NURTEC 75 mg at onset under tongue.  "Limit 1 in 24 hours. 8 a month. 8 tablet 3    rosuvastatin (CRESTOR) 5 mg tablet Take 5 mg by mouth daily      zolpidem (AMBIEN) 5 mg tablet Take 0.5 tablets (2.5 mg total) by mouth daily at bedtime as needed 30 tablet     Cholecalciferol 50 MCG (2000 UT) CAPS Take 500 Units by mouth (Patient not taking: Reported on 3/25/2024)      fluconazole (DIFLUCAN) 200 mg tablet  (Patient not taking: Reported on 3/25/2024)      predniSONE 10 mg tablet Take 30 mg daily for 3 days. Then take 20 mg daily for 3 days. Then take 10 mg daily for 4 days. Then stop. (Patient not taking: Reported on 3/25/2024) 19 tablet 0    vitamin B-12 (VITAMIN B-12) 1,000 mcg tablet Take 5,000 mcg by mouth daily Takes 1 daily (Patient not taking: Reported on 3/25/2024)       No current facility-administered medications for this visit.     Allergies   Allergen Reactions    Ampicillin Hives    Darvon [Propoxyphene] Vomiting    Lipitor [Atorvastatin] Arthralgia    Vioxx [Rofecoxib]        Objective   Vitals: Blood pressure 122/80, pulse 77, height 5' 5\" (1.651 m), weight 121 kg (265 lb 12.8 oz), SpO2 99%.    Physical Exam  Vitals reviewed.   Constitutional:       General: She is not in acute distress.     Appearance: Normal appearance. She is not ill-appearing.   HENT:      Head: Normocephalic and atraumatic.      Nose: Nose normal.   Eyes:      Extraocular Movements: Extraocular movements intact.      Conjunctiva/sclera: Conjunctivae normal.   Pulmonary:      Effort: No respiratory distress.   Musculoskeletal:      Cervical back: Normal range of motion.   Neurological:      General: No focal deficit present.      Mental Status: She is alert and oriented to person, place, and time.   Psychiatric:         Mood and Affect: Mood normal.         Behavior: Behavior normal.         The history was obtained from the review of the chart, patient.    Lab Results:   Lab Results   Component Value Date/Time    Hemoglobin A1C 7.7 02/01/2024 12:00 AM    Hemoglobin " "A1C 7.7 (A) 05/08/2023 12:34 PM           Imaging Studies: I have personally reviewed pertinent reports.      Portions of the record may have been created with voice recognition software. Occasional wrong word or \"sound a like\" substitutions may have occurred due to the inherent limitations of voice recognition software. Read the chart carefully and recognize, using context, where substitutions have occurred.    "

## 2024-04-19 ENCOUNTER — LAB (OUTPATIENT)
Dept: LAB | Facility: CLINIC | Age: 64
End: 2024-04-19
Payer: COMMERCIAL

## 2024-04-19 DIAGNOSIS — E11.65 UNCONTROLLED TYPE 2 DIABETES MELLITUS WITH HYPERGLYCEMIA (HCC): ICD-10-CM

## 2024-04-19 DIAGNOSIS — E04.1 THYROID NODULE: ICD-10-CM

## 2024-04-19 LAB
ALBUMIN SERPL BCP-MCNC: 4 G/DL (ref 3.5–5)
ALP SERPL-CCNC: 56 U/L (ref 34–104)
ALT SERPL W P-5'-P-CCNC: 18 U/L (ref 7–52)
ANION GAP SERPL CALCULATED.3IONS-SCNC: 8 MMOL/L (ref 4–13)
AST SERPL W P-5'-P-CCNC: 16 U/L (ref 13–39)
BILIRUB SERPL-MCNC: 0.42 MG/DL (ref 0.2–1)
BUN SERPL-MCNC: 22 MG/DL (ref 5–25)
CALCIUM SERPL-MCNC: 9.2 MG/DL (ref 8.4–10.2)
CHLORIDE SERPL-SCNC: 100 MMOL/L (ref 96–108)
CO2 SERPL-SCNC: 29 MMOL/L (ref 21–32)
CREAT SERPL-MCNC: 0.77 MG/DL (ref 0.6–1.3)
GFR SERPL CREATININE-BSD FRML MDRD: 83 ML/MIN/1.73SQ M
GLUCOSE P FAST SERPL-MCNC: 184 MG/DL (ref 65–99)
POTASSIUM SERPL-SCNC: 4.5 MMOL/L (ref 3.5–5.3)
PROT SERPL-MCNC: 7.1 G/DL (ref 6.4–8.4)
SODIUM SERPL-SCNC: 137 MMOL/L (ref 135–147)
TSH SERPL DL<=0.05 MIU/L-ACNC: 1.88 UIU/ML (ref 0.45–4.5)

## 2024-04-19 PROCEDURE — 36415 COLL VENOUS BLD VENIPUNCTURE: CPT

## 2024-04-19 PROCEDURE — 84443 ASSAY THYROID STIM HORMONE: CPT

## 2024-04-19 PROCEDURE — 80053 COMPREHEN METABOLIC PANEL: CPT

## 2024-04-22 NOTE — RESULT ENCOUNTER NOTE
Hi  Comprehensive metabolic panel file is within normal range, fasting blood sugar is elevated.  Thyroid blood work is within normal range

## 2024-04-25 ENCOUNTER — HOSPITAL ENCOUNTER (OUTPATIENT)
Dept: RADIOLOGY | Facility: HOSPITAL | Age: 64
Discharge: HOME | End: 2024-04-25
Payer: COMMERCIAL

## 2024-04-25 VITALS — HEIGHT: 65 IN | WEIGHT: 264 LBS | BODY MASS INDEX: 43.99 KG/M2

## 2024-04-25 DIAGNOSIS — Z12.31 ENCOUNTER FOR SCREENING MAMMOGRAM FOR MALIGNANT NEOPLASM OF BREAST: ICD-10-CM

## 2024-04-25 PROCEDURE — 77067 SCR MAMMO BI INCL CAD: CPT

## 2024-04-25 PROCEDURE — 77063 BREAST TOMOSYNTHESIS BI: CPT

## 2024-04-26 DIAGNOSIS — E11.65 TYPE 2 DIABETES MELLITUS WITH HYPERGLYCEMIA, WITHOUT LONG-TERM CURRENT USE OF INSULIN (HCC): ICD-10-CM

## 2024-04-26 RX ORDER — DAPAGLIFLOZIN 10 MG/1
10 TABLET, FILM COATED ORAL DAILY
Qty: 30 TABLET | Refills: 5 | Status: SHIPPED | OUTPATIENT
Start: 2024-04-26

## 2024-05-24 ENCOUNTER — HOSPITAL ENCOUNTER (OUTPATIENT)
Dept: GASTROENTEROLOGY | Facility: HOSPITAL | Age: 64
Setting detail: OUTPATIENT SURGERY
End: 2024-05-24
Payer: COMMERCIAL

## 2024-05-24 ENCOUNTER — ANESTHESIA (OUTPATIENT)
Dept: GASTROENTEROLOGY | Facility: HOSPITAL | Age: 64
End: 2024-05-24

## 2024-05-24 ENCOUNTER — ANESTHESIA EVENT (OUTPATIENT)
Dept: GASTROENTEROLOGY | Facility: HOSPITAL | Age: 64
End: 2024-05-24

## 2024-05-24 VITALS
RESPIRATION RATE: 16 BRPM | HEART RATE: 81 BPM | TEMPERATURE: 97.7 F | DIASTOLIC BLOOD PRESSURE: 64 MMHG | OXYGEN SATURATION: 99 % | SYSTOLIC BLOOD PRESSURE: 118 MMHG

## 2024-05-24 DIAGNOSIS — K21.9 GASTROESOPHAGEAL REFLUX DISEASE, UNSPECIFIED WHETHER ESOPHAGITIS PRESENT: ICD-10-CM

## 2024-05-24 DIAGNOSIS — Z12.11 SCREENING FOR COLON CANCER: ICD-10-CM

## 2024-05-24 DIAGNOSIS — Z90.3 H/O GASTRIC SLEEVE: ICD-10-CM

## 2024-05-24 PROBLEM — Z98.890 PONV (POSTOPERATIVE NAUSEA AND VOMITING): Status: ACTIVE | Noted: 2024-05-24

## 2024-05-24 PROBLEM — R11.2 PONV (POSTOPERATIVE NAUSEA AND VOMITING): Status: ACTIVE | Noted: 2024-05-24

## 2024-05-24 LAB — GLUCOSE SERPL-MCNC: 186 MG/DL (ref 65–140)

## 2024-05-24 PROCEDURE — 82948 REAGENT STRIP/BLOOD GLUCOSE: CPT

## 2024-05-24 PROCEDURE — 43239 EGD BIOPSY SINGLE/MULTIPLE: CPT | Performed by: INTERNAL MEDICINE

## 2024-05-24 PROCEDURE — 45385 COLONOSCOPY W/LESION REMOVAL: CPT | Performed by: INTERNAL MEDICINE

## 2024-05-24 PROCEDURE — 88305 TISSUE EXAM BY PATHOLOGIST: CPT | Performed by: PATHOLOGY

## 2024-05-24 PROCEDURE — 88342 IMHCHEM/IMCYTCHM 1ST ANTB: CPT | Performed by: PATHOLOGY

## 2024-05-24 RX ORDER — GLYCOPYRROLATE 0.2 MG/ML
INJECTION INTRAMUSCULAR; INTRAVENOUS AS NEEDED
Status: DISCONTINUED | OUTPATIENT
Start: 2024-05-24 | End: 2024-05-24

## 2024-05-24 RX ORDER — PROPOFOL 10 MG/ML
INJECTION, EMULSION INTRAVENOUS AS NEEDED
Status: DISCONTINUED | OUTPATIENT
Start: 2024-05-24 | End: 2024-05-24

## 2024-05-24 RX ORDER — SODIUM CHLORIDE, SODIUM LACTATE, POTASSIUM CHLORIDE, CALCIUM CHLORIDE 600; 310; 30; 20 MG/100ML; MG/100ML; MG/100ML; MG/100ML
INJECTION, SOLUTION INTRAVENOUS CONTINUOUS PRN
Status: DISCONTINUED | OUTPATIENT
Start: 2024-05-24 | End: 2024-05-24

## 2024-05-24 RX ORDER — LIDOCAINE HYDROCHLORIDE 20 MG/ML
INJECTION, SOLUTION EPIDURAL; INFILTRATION; INTRACAUDAL; PERINEURAL AS NEEDED
Status: DISCONTINUED | OUTPATIENT
Start: 2024-05-24 | End: 2024-05-24

## 2024-05-24 RX ORDER — ONDANSETRON 2 MG/ML
INJECTION INTRAMUSCULAR; INTRAVENOUS AS NEEDED
Status: DISCONTINUED | OUTPATIENT
Start: 2024-05-24 | End: 2024-05-24

## 2024-05-24 RX ADMIN — GLYCOPYRROLATE 0.1 MG: 0.2 INJECTION, SOLUTION INTRAMUSCULAR; INTRAVENOUS at 08:00

## 2024-05-24 RX ADMIN — PROPOFOL 120 MCG/KG/MIN: 10 INJECTION, EMULSION INTRAVENOUS at 08:07

## 2024-05-24 RX ADMIN — PROPOFOL 50 MG: 10 INJECTION, EMULSION INTRAVENOUS at 08:09

## 2024-05-24 RX ADMIN — LIDOCAINE HYDROCHLORIDE 100 MG: 20 INJECTION, SOLUTION EPIDURAL; INFILTRATION; INTRACAUDAL; PERINEURAL at 08:00

## 2024-05-24 RX ADMIN — PROPOFOL 30 MG: 10 INJECTION, EMULSION INTRAVENOUS at 08:20

## 2024-05-24 RX ADMIN — PROPOFOL 130 MG: 10 INJECTION, EMULSION INTRAVENOUS at 08:05

## 2024-05-24 RX ADMIN — SODIUM CHLORIDE, SODIUM LACTATE, POTASSIUM CHLORIDE, AND CALCIUM CHLORIDE: .6; .31; .03; .02 INJECTION, SOLUTION INTRAVENOUS at 07:40

## 2024-05-24 RX ADMIN — ONDANSETRON 4 MG: 2 INJECTION INTRAMUSCULAR; INTRAVENOUS at 08:00

## 2024-05-24 NOTE — ANESTHESIA PREPROCEDURE EVALUATION
Procedure:  COLONOSCOPY  EGD    Relevant Problems   ANESTHESIA   (+) PONV (postoperative nausea and vomiting)      CARDIO   (+) Essential hypertension   (+) Mixed hyperlipidemia      ENDO   (+) Type 2 diabetes mellitus (HCC)      /RENAL   (+) CKD (chronic kidney disease), stage II      NEURO/PSYCH   (+) Headache   (+) Numbness and tingling of left side of face      PULMONARY   (+) Obstructive sleep apnea-hypopnea syndrome        STARR 8/11/2022    Left Ventricle: Left ventricular cavity size is normal. Mild to moderately increased basal septal wall thickness. The left ventricular ejection fraction is 60-65%. Systolic function is normal. Wall motion is normal. Diastolic function is normal.    IVS: There is sigmoid appearance of the septum.    Right Ventricle: Right ventricular cavity size is normal. Systolic function is normal.    Mitral Valve: There is mild annular calcification. There is mild anteriorly leaflet tip and subvalvular calcification. There is trace regurgitation. There is no evidence of stenosis.      Physical Exam    Airway    Mallampati score: II  TM Distance: >3 FB  Neck ROM: full     Dental   No notable dental hx     Cardiovascular      Pulmonary      Other Findings  post-pubertal.      Anesthesia Plan  ASA Score- 2     Anesthesia Type- IV sedation with anesthesia with ASA Monitors.         Additional Monitors:     Airway Plan:            Plan Factors-Exercise tolerance (METS): >4 METS.    Chart reviewed. EKG reviewed.  Existing labs reviewed. Patient summary reviewed.    Patient is not a current smoker.              Induction- intravenous.    Postoperative Plan-         Informed Consent- Anesthetic plan and risks discussed with patient.  I personally reviewed this patient with the CRNA. Discussed and agreed on the Anesthesia Plan with the CRNA..

## 2024-05-24 NOTE — ANESTHESIA POSTPROCEDURE EVALUATION
Post-Op Assessment Note    CV Status:  Stable  Pain Score: 0    Pain management: adequate       Mental Status:  Alert and awake   Hydration Status:  Stable   PONV Controlled:  None   Airway Patency:  Patent     Post Op Vitals Reviewed: Yes    No anethesia notable event occurred.    Staff: CRNA               BP   122/56   Temp      Pulse  75   Resp  14    SpO2   100

## 2024-05-24 NOTE — H&P
History and Physical - [facility]  Zuly Queen 63 y.o. female MRN: [id number]                  HPI: Zuly Queen is a 63 y.o. year old female who presents for history of GERD and polyps      REVIEW OF SYSTEMS: Per the HPI, and otherwise unremarkable.    Historical Information   Past Medical History:   Diagnosis Date    Acid reflux     CKD (chronic kidney disease), stage II     Diabetes mellitus (HCC)     Dizziness     Essential hypertension     Hyperlipidemia     Hypertension     Type 2 diabetes mellitus (HCC)      Past Surgical History:   Procedure Laterality Date    ANKLE SURGERY  Nov 2019    ARTHROSCOPIC REPAIR ACL Right     knee    BARIATRIC SURGERY      gastric sleeve    GALLBLADDER SURGERY      SKIN BIOPSY      scalp    TENDON REPAIR Left     ankle    TONSILLECTOMY      US GUIDED LYMPH NODE BIOPSY RIGHT  12/1/2023    US GUIDED THYROID BIOPSY  12/1/2023     Social History   Social History     Substance and Sexual Activity   Alcohol Use Yes    Alcohol/week: 5.0 standard drinks of alcohol    Types: 5 Glasses of wine per week    Comment: occ.      Social History     Substance and Sexual Activity   Drug Use Never     Social History     Tobacco Use   Smoking Status Never   Smokeless Tobacco Never     Family History   Problem Relation Age of Onset    Hypertension Mother     Diabetes type II Father     Diabetes Father     Diabetes type II Paternal Grandmother     Cancer Paternal Grandmother     Breast cancer Paternal Grandmother 70    Breast cancer Paternal Aunt 68       Meds/Allergies       Current Outpatient Medications:     acarbose (PRECOSE) 25 mg tablet    amitriptyline (ELAVIL) 10 mg tablet    Biotin 1000 MCG CHEW    Calcium 500 MG tablet    dapagliflozin (Farxiga) 10 MG tablet    DULoxetine (CYMBALTA) 30 mg delayed release capsule    glipiZIDE (GLUCOTROL XL) 2.5 mg 24 hr tablet    lansoprazole (PREVACID) 15 mg capsule    lisinopril (ZESTRIL) 2.5 mg tablet    metFORMIN (GLUCOPHAGE-XR) 500  mg 24 hr tablet    Multiple Vitamins-Minerals (MULTIVITAMIN WITH MINERALS) tablet    nystatin (MYCOSTATIN) cream    nystatin (MYCOSTATIN) powder    Probiotic Product (ALIGN) 4 MG CAPS    rosuvastatin (CRESTOR) 5 mg tablet    zolpidem (AMBIEN) 5 mg tablet    betamethasone dipropionate 0.05 % lotion    Cholecalciferol 50 MCG (2000 UT) CAPS    Continuous Blood Gluc  (FREESTYLE FRANKI 14 DAY READER) SUZY    Continuous Blood Gluc Sensor (FreeStyle Franki 14 Day Sensor) MISC    Continuous Blood Gluc Sensor (FreeStyle Franki 3 Sensor) MISC    fluconazole (DIFLUCAN) 200 mg tablet    meclizine (ANTIVERT) 25 mg tablet    ondansetron (ZOFRAN) 4 mg tablet    predniSONE 10 mg tablet    rimegepant sulfate (Nurtec) 75 mg TBDP    vitamin B-12 (VITAMIN B-12) 1,000 mcg tablet    Allergies   Allergen Reactions    Ampicillin Hives    Darvon [Propoxyphene] Vomiting    Lipitor [Atorvastatin] Arthralgia    Vioxx [Rofecoxib]        Objective     There were no vitals taken for this visit.      PHYSICAL EXAM    Gen: NAD  Head: NCAT  CV: RRR  CHEST: Clear  ABD: soft, NT/ND  EXT: no edema      ASSESSMENT/PLAN:  This is a 63 y.o. year old female here for EGD colonoscopy, and she is stable and optimized for her procedure.

## 2024-05-30 PROCEDURE — 88305 TISSUE EXAM BY PATHOLOGIST: CPT | Performed by: PATHOLOGY

## 2024-05-30 PROCEDURE — 88342 IMHCHEM/IMCYTCHM 1ST ANTB: CPT | Performed by: PATHOLOGY

## 2024-06-11 DIAGNOSIS — E11.65 UNCONTROLLED TYPE 2 DIABETES MELLITUS WITH HYPERGLYCEMIA (HCC): ICD-10-CM

## 2024-06-11 RX ORDER — ACARBOSE 25 MG/1
TABLET ORAL
Qty: 180 TABLET | Refills: 1 | Status: SHIPPED | OUTPATIENT
Start: 2024-06-11

## 2024-06-12 DIAGNOSIS — E11.65 TYPE 2 DIABETES MELLITUS WITH HYPERGLYCEMIA, WITHOUT LONG-TERM CURRENT USE OF INSULIN (HCC): ICD-10-CM

## 2024-06-12 RX ORDER — BLOOD-GLUCOSE SENSOR
EACH MISCELLANEOUS
Qty: 6 EACH | Refills: 0 | Status: SHIPPED | OUTPATIENT
Start: 2024-06-12

## 2024-06-12 RX ORDER — GLIPIZIDE 2.5 MG/1
2.5 TABLET, EXTENDED RELEASE ORAL 2 TIMES DAILY
Qty: 180 TABLET | Refills: 0 | Status: SHIPPED | OUTPATIENT
Start: 2024-06-12

## 2024-06-12 NOTE — TELEPHONE ENCOUNTER
Patient called to request a refill for their Glipizide 2.5mg and Freestyle Ankit 3 Sensors advised a refill was requested on 6/12/24 and is pending approval. Patient verbalized understanding and is in agreement.

## 2024-08-20 ENCOUNTER — HOSPITAL ENCOUNTER (EMERGENCY)
Facility: HOSPITAL | Age: 64
Discharge: HOME | End: 2024-08-20
Attending: EMERGENCY MEDICINE
Payer: COMMERCIAL

## 2024-08-20 VITALS
SYSTOLIC BLOOD PRESSURE: 145 MMHG | OXYGEN SATURATION: 96 % | DIASTOLIC BLOOD PRESSURE: 72 MMHG | HEART RATE: 65 BPM | RESPIRATION RATE: 18 BRPM | TEMPERATURE: 98.1 F

## 2024-08-20 DIAGNOSIS — G43.909 MIGRAINE HEADACHE: Primary | ICD-10-CM

## 2024-08-20 PROCEDURE — 99284 EMERGENCY DEPT VISIT MOD MDM: CPT | Performed by: EMERGENCY MEDICINE

## 2024-08-20 PROCEDURE — 99283 EMERGENCY DEPT VISIT LOW MDM: CPT

## 2024-08-20 PROCEDURE — 96374 THER/PROPH/DIAG INJ IV PUSH: CPT

## 2024-08-20 RX ORDER — DROPERIDOL 2.5 MG/ML
1.25 INJECTION, SOLUTION INTRAMUSCULAR; INTRAVENOUS ONCE
Status: COMPLETED | OUTPATIENT
Start: 2024-08-20 | End: 2024-08-20

## 2024-08-20 RX ORDER — DROPERIDOL 2.5 MG/ML
2.5 INJECTION, SOLUTION INTRAMUSCULAR; INTRAVENOUS ONCE
Status: DISCONTINUED | OUTPATIENT
Start: 2024-08-20 | End: 2024-08-20

## 2024-08-20 RX ADMIN — DROPERIDOL 1.25 MG: 2.5 INJECTION, SOLUTION INTRAMUSCULAR; INTRAVENOUS at 09:03

## 2024-08-20 NOTE — DISCHARGE INSTRUCTIONS
Return to ED if quick onset, sudden intensity return of headache or any visual changes, nausea, vomiting, or dizziness with return or symptoms

## 2024-08-20 NOTE — ED PROVIDER NOTES
History  Chief Complaint   Patient presents with    Headache     Pt reports headache on/off x3 days, worsening, hx migraines and feels different, also c/o right leg pain onset Saturday, denies injury/trauma     64-year-old female with past medical history of vestibular migraines presents for 3 days of intermittent headache.  Patient states that 3 days ago she woke with sudden onset headache.  She normally takes amitriptyline for chronic vestibular migraine.  When symptoms are not controlled with amitriptyline alone she takes nurtec for breakthrough pain.  Typically symptoms resolve following Nurtec, but symptoms have persisted causing her to seek treatment in the ED.  Headache with associated photophobia and nausea. Worse with positional change. In addition, patient complains of anterior R sided leg pain which began at the same time as headache.  No history of previous DVTs or blood clots.  At present denies any fever, chills, sore throat, cough, chest pain, shortness of breath, abdominal pain, nausea, vomiting, diarrhea, dysuria, hematuria.           Prior to Admission Medications   Prescriptions Last Dose Informant Patient Reported? Taking?   Biotin 1000 MCG CHEW  Self Yes No   Sig: Chew 6,000 mcg   Calcium 500 MG tablet  Self Yes No   Sig: Take by mouth   Cholecalciferol 50 MCG (2000 UT) CAPS  Self Yes No   Sig: Take 500 Units by mouth   Patient not taking: Reported on 3/25/2024   Continuous Blood Gluc  (FREESTYLE FRANKI 14 DAY READER) SUZY  Self Yes No   Sig: by Does not apply route   Continuous Blood Gluc Sensor (FreeStyle Franki 14 Day Sensor) MISC  Self No No   Sig: Use 1 Device every 14 (fourteen) days   Continuous Glucose Sensor (FreeStyle Franki 3 Sensor) [facility]   No No   Sig: CHANGE 1 SENSOR EVERY 14 DAYS   DULoxetine (CYMBALTA) 30 mg delayed release capsule  Self Yes No   Sig: Take 30 mg by mouth daily   Multiple Vitamins-Minerals (MULTIVITAMIN WITH MINERALS) tablet  Self Yes No   Sig: Take 1 tablet  by mouth daily   Probiotic Product (ALIGN) 4 MG CAPS  Self Yes No   Sig: Take by mouth   acarbose (PRECOSE) 25 mg tablet   No No   Sig: TAKE ONE TABLET WITH BREAKFAST AND LUNCH   amitriptyline (ELAVIL) 10 mg tablet  Self No No   Sig: TAKE 1 TABLET BY MOUTH DAILY AT BEDTIME   betamethasone dipropionate 0.05 % lotion  Self Yes No   Sig: Apply topically 2 (two) times a day To affected area   dapagliflozin (Farxiga) 10 MG tablet   No No   Sig: TAKE 1 TABLET BY MOUTH EVERY DAY   fluconazole (DIFLUCAN) 200 mg tablet  Self Yes No   Patient not taking: Reported on 3/25/2024   glipiZIDE (GLUCOTROL XL) 2.5 mg 24 hr tablet   No No   Sig: TAKE 1 TABLET BY MOUTH TWICE A DAY   lansoprazole (PREVACID) 15 mg capsule  Self Yes No   Sig: Take 15 mg by mouth daily   lisinopril (ZESTRIL) 2.5 mg tablet  Self Yes No   Sig: Take 2.5 mg by mouth daily   meclizine (ANTIVERT) 25 mg tablet  Self No No   Sig: Take 1 tablet (25 mg total) by mouth 3 (three) times a day as needed for dizziness   metFORMIN (GLUCOPHAGE-XR) 500 mg 24 hr tablet  Self No No   Sig: TAKE 2 TABLETS BY MOUTH TWICE A DAY WITH FOOD   nystatin (MYCOSTATIN) cream  Self Yes No   Sig: Apply topically as needed   nystatin (MYCOSTATIN) powder  Self Yes No   ondansetron (ZOFRAN) 4 mg tablet  Self No No   Sig: Take 1 tablet (4 mg total) by mouth every 6 (six) hours   predniSONE 10 mg tablet  Self No No   Sig: Take 30 mg daily for 3 days. Then take 20 mg daily for 3 days. Then take 10 mg daily for 4 days. Then stop.   Patient not taking: Reported on 3/25/2024   rimegepant sulfate (Nurtec) 75 mg TBDP   No No   Sig: Take one NURTEC 75 mg at onset under tongue. Limit 1 in 24 hours. 8 a month.   rosuvastatin (CRESTOR) 5 mg tablet  Self Yes No   Sig: Take 5 mg by mouth daily   vitamin B-12 (VITAMIN B-12) 1,000 mcg tablet  Self Yes No   Sig: Take 5,000 mcg by mouth daily Takes 1 daily   Patient not taking: Reported on 3/25/2024   zolpidem (AMBIEN) 5 mg tablet  Self No No   Sig: Take 0.5  tablets (2.5 mg total) by mouth daily at bedtime as needed      Facility-Administered Medications: None       Past Medical History:   Diagnosis Date    Acid reflux     CKD (chronic kidney disease), stage II     Diabetes mellitus (HCC)     Dizziness     Essential hypertension     Hyperlipidemia     Hypertension     Type 2 diabetes mellitus (HCC)        Past Surgical History:   Procedure Laterality Date    ANKLE SURGERY  Nov 2019    ARTHROSCOPIC REPAIR ACL Right     knee    BARIATRIC SURGERY      gastric sleeve    GALLBLADDER SURGERY      SKIN BIOPSY      scalp    TENDON REPAIR Left     ankle    TONSILLECTOMY      US GUIDED LYMPH NODE BIOPSY RIGHT  12/1/2023    US GUIDED THYROID BIOPSY  12/1/2023       Family History   Problem Relation Age of Onset    Hypertension Mother     Diabetes type II Father     Diabetes Father     Diabetes type II Paternal Grandmother     Cancer Paternal Grandmother     Breast cancer Paternal Grandmother 70    Breast cancer Paternal Aunt 68     I have reviewed and agree with the history as documented.    E-Cigarette/Vaping    E-Cigarette Use Never User      E-Cigarette/Vaping Substances    Nicotine No     THC No     CBD No     Flavoring No     Other No     Unknown No      Social History     Tobacco Use    Smoking status: Never    Smokeless tobacco: Never   Vaping Use    Vaping status: Never Used   Substance Use Topics    Alcohol use: Yes     Alcohol/week: 5.0 standard drinks of alcohol     Types: 5 Glasses of wine per week     Comment: occ.     Drug use: Never        Review of Systems   Eyes:  Positive for photophobia.   Gastrointestinal:  Positive for nausea.   Neurological:  Positive for headaches. Negative for dizziness, seizures, syncope and weakness.   All other systems reviewed and are negative.      Physical Exam  ED Triage Vitals [08/20/24 0817]   Temperature Pulse Respirations Blood Pressure SpO2   98.1 °F (36.7 °C) 80 18 (!) 183/76 98 %      Temp Source Heart Rate Source Patient  Position - Orthostatic VS BP Location FiO2 (%)   Oral Monitor Sitting Right arm --      Pain Score       7             Orthostatic Vital Signs  Vitals:    08/20/24 0817 08/20/24 0959   BP: (!) 183/76 145/72   Pulse: 80 65   Patient Position - Orthostatic VS: Sitting Sitting       Physical Exam  Vitals and nursing note reviewed.   Constitutional:       General: She is not in acute distress.     Appearance: She is well-developed.   HENT:      Head: Normocephalic and atraumatic.   Eyes:      General: No visual field deficit.  Cardiovascular:      Rate and Rhythm: Normal rate and regular rhythm.      Heart sounds: No murmur heard.  Pulmonary:      Effort: Pulmonary effort is normal. No respiratory distress.      Breath sounds: Normal breath sounds.   Musculoskeletal:         General: No swelling.      Cervical back: Neck supple.      Right lower leg: No swelling or tenderness. No edema.      Left lower leg: No swelling or tenderness. No edema.      Comments: No discoloration of skin.  No pain in calf, behind knee with dorsiflexion of foot.  Decreased pain with ambulation.  Mild tenderness to palpation over anterior, lateral musculature of the right lower extremity   Skin:     General: Skin is warm and dry.      Capillary Refill: Capillary refill takes less than 2 seconds.   Neurological:      Mental Status: She is alert and oriented to person, place, and time. Mental status is at baseline.      GCS: GCS eye subscore is 4. GCS verbal subscore is 5. GCS motor subscore is 6.      Cranial Nerves: Cranial nerves 2-12 are intact. No facial asymmetry.      Sensory: Sensation is intact.      Motor: Motor function is intact. No weakness or pronator drift.      Coordination: Coordination is intact. Finger-Nose-Finger Test and Heel to Shin Test normal.   Psychiatric:         Mood and Affect: Mood normal.         ED Medications  Medications   droperidol (INAPSINE) injection 1.25 mg (1.25 mg Intravenous Given 8/20/24 0903)        Diagnostic Studies  Results Reviewed       None                   No orders to display         Procedures  Procedures      ED Course  ED Course as of 08/20/24 1123   Tue Aug 20, 2024   0858 64-year-old female with past medical history of vestibular migraines presents for 3 days of intermittent headache.  Patient states that 3 days ago she woke with sudden onset headache.  She normally takes amitriptyline for chronic vestibular migraine.  When symptoms are not controlled with amitriptyline alone she takes nurtec for breakthrough pain.  Typically symptoms resolve following Nurtec, but symptoms have persisted causing her to seek treatment in the ED.  Headache with associated photophobia and nausea. Worse with positional change. In addition, patient complains of anterior R sided leg pain which began at the same time as headache.  No history of previous DVTs or blood clots.  At present denies any fever, chills, sore throat, cough, chest pain, shortness of breath, abdominal pain, nausea, vomiting, diarrhea, dysuria, hematuria.    0859 Differential diagnoses include but not limited to: Migraine, tension headache, cluster headache, muscle strain, DVT   0859 Ordered droperidol for symptomatic relief, CT head not indicated as patient denies any trauma with reassuring neuro exam and intermittent headache similar to previous vestibular migranes   1015 Patient reports resolution of headache symptoms following droperidol administration.  Physical exam with low suspicion for DVT in right leg.  More likely muscle strain of anterior right leg.  Patient given instruction to return if noticeable swelling of calf, skin changes, pain was to develop in right lower extremity.  Patient understands treatment plan and ready for discharge, states she feels well                                       Medical Decision Making  Risk  Prescription drug management.          Disposition  Final diagnoses:   Migraine headache     Time reflects  when diagnosis was documented in both MDM as applicable and the Disposition within this note       Time User Action Codes Description Comment    8/20/2024 10:15 AM Jeet Rush Add [G43.909] Migraine headache           ED Disposition       ED Disposition   Discharge    Condition   Stable    Date/Time   Tue Aug 20, 2024 10:15 AM    Comment   Zuly Queen discharge to home/self care.                   Follow-up Information       Follow up With Specialties Details Why Contact Info    Monica Sampson MD Family Medicine Schedule an appointment as soon as possible for a visit  As needed, If symptoms worsen [address] [facility] [id number]  [phone]              Discharge Medication List as of 8/20/2024 10:16 AM        CONTINUE these medications which have NOT CHANGED    Details   acarbose (PRECOSE) 25 mg tablet TAKE ONE TABLET WITH BREAKFAST AND LUNCH, Normal      amitriptyline (ELAVIL) 10 mg tablet TAKE 1 TABLET BY MOUTH DAILY AT BEDTIME, Starting Thu 3/14/2024, Normal      betamethasone dipropionate 0.05 % lotion Apply topically 2 (two) times a day To affected area, Starting Wed 1/31/2024, Historical Med      Biotin 1000 MCG CHEW Chew 6,000 mcg, Historical Med      Calcium 500 MG tablet Take by mouth, Starting Wed 1/21/2015, Historical Med      Cholecalciferol 50 MCG (2000 UT) CAPS Take 500 Units by mouth, Historical Med      Continuous Blood Gluc  (FREESTYLE ANKIT 14 DAY READER) SUZY by Does not apply route, Historical Med      !! Continuous Blood Gluc Sensor (FreeStyle Ankit 14 Day Sensor) MISC Use 1 Device every 14 (fourteen) days, Starting Thu 2/16/2023, Normal      !! Continuous Glucose Sensor (FreeStyle Ankit 3 Sensor) MISC CHANGE 1 SENSOR EVERY 14 DAYS, Normal      dapagliflozin (Farxiga) 10 MG tablet TAKE 1 TABLET BY MOUTH EVERY DAY, Starting Fri 4/26/2024, Normal      DULoxetine (CYMBALTA) 30 mg delayed release capsule Take 30 mg by mouth daily, Historical Med      fluconazole (DIFLUCAN) 200 mg  tablet Starting Mon 11/14/2022, Historical Med      glipiZIDE (GLUCOTROL XL) 2.5 mg 24 hr tablet TAKE 1 TABLET BY MOUTH TWICE A DAY, Starting Wed 6/12/2024, Normal      lansoprazole (PREVACID) 15 mg capsule Take 15 mg by mouth daily, Historical Med      lisinopril (ZESTRIL) 2.5 mg tablet Take 2.5 mg by mouth daily, Historical Med      meclizine (ANTIVERT) 25 mg tablet Take 1 tablet (25 mg total) by mouth 3 (three) times a day as needed for dizziness, Starting Sat 7/16/2022, Normal      metFORMIN (GLUCOPHAGE-XR) 500 mg 24 hr tablet TAKE 2 TABLETS BY MOUTH TWICE A DAY WITH FOOD, Normal      Multiple Vitamins-Minerals (MULTIVITAMIN WITH MINERALS) tablet Take 1 tablet by mouth daily, Historical Med      nystatin (MYCOSTATIN) cream Apply topically as needed, Historical Med      nystatin (MYCOSTATIN) powder Starting Mon 11/14/2022, Historical Med      ondansetron (ZOFRAN) 4 mg tablet Take 1 tablet (4 mg total) by mouth every 6 (six) hours, Starting Sat 7/16/2022, Normal      predniSONE 10 mg tablet Take 30 mg daily for 3 days. Then take 20 mg daily for 3 days. Then take 10 mg daily for 4 days. Then stop., Normal      Probiotic Product (ALIGN) 4 MG CAPS Take by mouth, Historical Med      rimegepant sulfate (Nurtec) 75 mg TBDP Take one NURTEC 75 mg at onset under tongue. Limit 1 in 24 hours. 8 a month., Normal      rosuvastatin (CRESTOR) 5 mg tablet Take 5 mg by mouth daily, Historical Med      vitamin B-12 (VITAMIN B-12) 1,000 mcg tablet Take 5,000 mcg by mouth daily Takes 1 daily, Historical Med      zolpidem (AMBIEN) 5 mg tablet Take 0.5 tablets (2.5 mg total) by mouth daily at bedtime as needed, Starting Mon 5/8/2023, No Print       !! - Potential duplicate medications found. Please discuss with provider.        No discharge procedures on file.    PDMP Review         Value Time User    PDMP Reviewed  Yes 11/15/2022  1:48 PM JACOBY Hughes             ED Provider  Attending physically available and evaluated Zuly  Bret. I managed the patient along with the ED Attending.    Electronically Signed by           Jeet Rush DO  08/20/24 112

## 2024-08-20 NOTE — ED ATTENDING ATTESTATION
8/20/2024  I, Ameya Sibley MD, saw and evaluated the patient. I have discussed the patient with the resident/non-physician practitioner and agree with the resident's/non-physician practitioner's findings, Plan of Care, and MDM as documented in the resident's/non-physician practitioner's note, except where noted. All available labs and Radiology studies were reviewed.  I was present for key portions of any procedure(s) performed by the resident/non-physician practitioner and I was immediately available to provide assistance.       At this point I agree with the current assessment done in the Emergency Department.  I have conducted an independent evaluation of this patient a history and physical is as follows:    64-year-old female, history of headaches, presenting with headache.  Patient states she has had 3 days of intermittent headaches, not improved with home medications.  On exam, patient in no distress, no respiratory effort, able to stand and ambulate without difficulty.    ED Course     Headache resolved with medication ED, patient feeling better and requesting to be discharged.  Discussed with patient follow-up with her doctors, return precautions given.    Critical Care Time  Procedures

## 2024-08-23 ENCOUNTER — LAB (OUTPATIENT)
Dept: LAB | Facility: CLINIC | Age: 64
End: 2024-08-23
Payer: COMMERCIAL

## 2024-08-23 DIAGNOSIS — E53.8 VITAMIN B 12 DEFICIENCY: ICD-10-CM

## 2024-08-23 DIAGNOSIS — E11.65 UNCONTROLLED TYPE 2 DIABETES MELLITUS WITH HYPERGLYCEMIA (HCC): ICD-10-CM

## 2024-08-23 LAB
ANION GAP SERPL CALCULATED.3IONS-SCNC: 10 MMOL/L (ref 4–13)
BUN SERPL-MCNC: 23 MG/DL (ref 5–25)
CALCIUM SERPL-MCNC: 9.8 MG/DL (ref 8.4–10.2)
CHLORIDE SERPL-SCNC: 98 MMOL/L (ref 96–108)
CO2 SERPL-SCNC: 29 MMOL/L (ref 21–32)
CREAT SERPL-MCNC: 0.82 MG/DL (ref 0.6–1.3)
EST. AVERAGE GLUCOSE BLD GHB EST-MCNC: 189 MG/DL
GFR SERPL CREATININE-BSD FRML MDRD: 76 ML/MIN/1.73SQ M
GLUCOSE P FAST SERPL-MCNC: 167 MG/DL (ref 65–99)
HBA1C MFR BLD: 8.2 %
POTASSIUM SERPL-SCNC: 4.6 MMOL/L (ref 3.5–5.3)
SODIUM SERPL-SCNC: 137 MMOL/L (ref 135–147)
VIT B12 SERPL-MCNC: 467 PG/ML (ref 180–914)

## 2024-08-23 PROCEDURE — 82607 VITAMIN B-12: CPT

## 2024-08-23 PROCEDURE — 80048 BASIC METABOLIC PNL TOTAL CA: CPT

## 2024-08-23 PROCEDURE — 83036 HEMOGLOBIN GLYCOSYLATED A1C: CPT

## 2024-08-23 PROCEDURE — 36415 COLL VENOUS BLD VENIPUNCTURE: CPT

## 2024-08-26 ENCOUNTER — OFFICE VISIT (OUTPATIENT)
Dept: ENDOCRINOLOGY | Facility: CLINIC | Age: 64
End: 2024-08-26
Payer: COMMERCIAL

## 2024-08-26 VITALS
OXYGEN SATURATION: 97 % | HEART RATE: 79 BPM | SYSTOLIC BLOOD PRESSURE: 116 MMHG | BODY MASS INDEX: 45.15 KG/M2 | WEIGHT: 271 LBS | HEIGHT: 65 IN | DIASTOLIC BLOOD PRESSURE: 76 MMHG

## 2024-08-26 DIAGNOSIS — I10 ESSENTIAL HYPERTENSION: ICD-10-CM

## 2024-08-26 DIAGNOSIS — E78.2 MIXED HYPERLIPIDEMIA: ICD-10-CM

## 2024-08-26 DIAGNOSIS — E11.65 TYPE 2 DIABETES MELLITUS WITH HYPERGLYCEMIA, WITHOUT LONG-TERM CURRENT USE OF INSULIN (HCC): Primary | ICD-10-CM

## 2024-08-26 DIAGNOSIS — E04.1 THYROID NODULE INCIDENTALLY NOTED ON IMAGING STUDY: ICD-10-CM

## 2024-08-26 DIAGNOSIS — E11.21 TYPE 2 DIABETES MELLITUS WITH DIABETIC NEPHROPATHY, WITHOUT LONG-TERM CURRENT USE OF INSULIN (HCC): ICD-10-CM

## 2024-08-26 PROCEDURE — 95251 CONT GLUC MNTR ANALYSIS I&R: CPT | Performed by: PHYSICIAN ASSISTANT

## 2024-08-26 PROCEDURE — 99214 OFFICE O/P EST MOD 30 MIN: CPT | Performed by: PHYSICIAN ASSISTANT

## 2024-08-26 RX ORDER — ACARBOSE 25 MG/1
TABLET ORAL
Qty: 270 TABLET | Refills: 1 | Status: SHIPPED | OUTPATIENT
Start: 2024-08-26

## 2024-08-26 NOTE — PROGRESS NOTES
Patient Progress Note      CC: Dm      Referring Provider  No referring provider defined for this encounter.     History of Present Illness:   Zuly Queen is a 64 y.o. female with a history of type 2 diabetes without long term use of insulin. She has been off of insulin since her gastric sleeve surgery.  Diabetes course has been stable.  Denies any issues with her current regimen. Home glucose monitoring: are performed regularly, using Freestyle Ankit.      Average glucose 155 mg/dl  In target range 76%, above range 24%, below range 0%     Current regimen: metformin 1000 mg BID, Farxiga 10 mg daily, glipizide 2.5 mg BID, acarbose 25 mg with breakfast and lunch  compliant most of the time, denies any side effects from medications.  Hypoglycemic episodes: No, rare  H/o of hypoglycemia causing hospitalization or Intervention such as glucagon injection or ambulance call:  No  Hypoglycemia symptoms: jitteriness  Treatment of hypoglycemia: discussed treatment     Medic alert tag: recommended: Yes     Diabetes education: Not recently  Diet: 3 meals per day, 1-2 snacks per day. Timing of meals is predictable.  Diabetic diet compliance: during her vacation she was not compliant but she has been compliant most of the time.  Activity: Daily activity is predictable: Yes. No routine exercise.        Ophthamology: [facility] in [place], [place].  Podiatry: Nov 2021      Has hypertension: on ACE inhibitor/ARB, compliant most of the time  Has hyperlipidemia: on statin - tolerating well, no myalgias. compliant most of the time, denies any side effects from medications.     Thyroid disorders: Thyroid nodule. She is seeing ENT.  US done Sept 2023.  Biopsy done in December 2023 of the right mid thyroid nodule and submandibular lymph node were negative for malignancy.   FINDINGS:  Normal homogeneous smooth echotexture.     Right lobe: 5.8 x 1.8 x 2.3 cm. Volume 11.8 mL  Left lobe:  4.4 x 1.3 x 1.6 cm. Volume 4.5 mL  Isthmus: 0.5  cm.      Nodule #1.  Image 10.  RIGHT midgland nodule measuring 2 x 1.2 x 1.5 cm. Smaller compared to the previous exam when it measured 2.4 x 0.9 x 1.5 cm.  COMPOSITION:  2 points, solid or almost completely solid .  ECHOGENICITY:  2 points, hypoechoic.  SHAPE:  0 points, wider-than-tall.  MARGIN: 0 points, smooth.  ECHOGENIC FOCI:  3 points, punctate echogenic foci.  TI-RADS Classification: TR 5 (7 or > points), Highly suspicious.  FNA if > 1 cm.  Follow if > 0.5 cm.     There are additional nodules of lesser size and/or TI-RADS score.  These do not necessitate additional evaluation based on ACR criteria.     Right submandibular node measures 1.3 x 0.7 x 1.4 cm. There is abnormal echotexture with a small central cystic area. This corresponds to the finding seen on prior CT.     IMPRESSION:     1. Right thyroid nodule is slightly smaller than on the previous exam but demonstrates internal calcifications. TR 5. Biopsy should be considered.     2. Abnormal indeterminate right submandibular lymph node.     History of pancreatitis: Yes (from [place])      Patient Active Problem List   Diagnosis    Type 2 diabetes mellitus (HCC)    Essential hypertension    Mixed hyperlipidemia    Type 2 diabetes mellitus with hyperglycemia, without long-term current use of insulin (HCC)    Vertigo    Tinnitus of left ear    Numbness and tingling of left side of face    Class 3 severe obesity in adult (HCC)    Thyroid nodule incidentally noted on imaging study    Obstructive sleep apnea-hypopnea syndrome    Headache    CKD (chronic kidney disease), stage II    PONV (postoperative nausea and vomiting)      Past Medical History:   Diagnosis Date    Acid reflux     CKD (chronic kidney disease), stage II     Diabetes mellitus (HCC)     Dizziness     Essential hypertension     Hyperlipidemia     Hypertension     Type 2 diabetes mellitus (HCC)       Past Surgical History:   Procedure Laterality Date    ANKLE SURGERY  Nov 2019    ARTHROSCOPIC REPAIR  ACL Right     knee    BARIATRIC SURGERY      gastric sleeve    GALLBLADDER SURGERY      SKIN BIOPSY      scalp    TENDON REPAIR Left     ankle    TONSILLECTOMY      US GUIDED LYMPH NODE BIOPSY RIGHT  12/1/2023    US GUIDED THYROID BIOPSY  12/1/2023      Family History   Problem Relation Age of Onset    Hypertension Mother     Diabetes type II Father     Diabetes Father     Diabetes type II Paternal Grandmother     Cancer Paternal Grandmother     Breast cancer Paternal Grandmother 70    Breast cancer Paternal Aunt 68     Social History     Tobacco Use    Smoking status: Never    Smokeless tobacco: Never   Substance Use Topics    Alcohol use: Yes     Alcohol/week: 5.0 standard drinks of alcohol     Types: 5 Glasses of wine per week     Comment: occ.      Allergies   Allergen Reactions    Ampicillin Hives    Darvon [Propoxyphene] Vomiting    Lipitor [Atorvastatin] Arthralgia    Vioxx [Rofecoxib]          Current Outpatient Medications:     acarbose (PRECOSE) 25 mg tablet, Take one tablet with breakfast, lunch, and dinner, Disp: 270 tablet, Rfl: 1    amitriptyline (ELAVIL) 10 mg tablet, TAKE 1 TABLET BY MOUTH DAILY AT BEDTIME, Disp: 30 tablet, Rfl: 6    betamethasone dipropionate 0.05 % lotion, Apply topically 2 (two) times a day To affected area, Disp: , Rfl:     Biotin 1000 MCG CHEW, Chew 6,000 mcg, Disp: , Rfl:     Calcium 500 MG tablet, Take by mouth, Disp: , Rfl:     Continuous Blood Gluc  (FREESTYLE FRANKI 14 DAY READER) SUZY, by Does not apply route, Disp: , Rfl:     Continuous Glucose Sensor (FreeStyle Franki 3 Sensor) MISC, CHANGE 1 SENSOR EVERY 14 DAYS, Disp: 6 each, Rfl: 0    dapagliflozin (Farxiga) 10 MG tablet, TAKE 1 TABLET BY MOUTH EVERY DAY, Disp: 30 tablet, Rfl: 5    DULoxetine (CYMBALTA) 30 mg delayed release capsule, Take 30 mg by mouth daily, Disp: , Rfl:     glipiZIDE (GLUCOTROL XL) 2.5 mg 24 hr tablet, TAKE 1 TABLET BY MOUTH TWICE A DAY, Disp: 180 tablet, Rfl: 0    lansoprazole (PREVACID) 15  mg capsule, Take 15 mg by mouth daily, Disp: , Rfl:     lisinopril (ZESTRIL) 2.5 mg tablet, Take 2.5 mg by mouth daily, Disp: , Rfl:     meclizine (ANTIVERT) 25 mg tablet, Take 1 tablet (25 mg total) by mouth 3 (three) times a day as needed for dizziness, Disp: 30 tablet, Rfl: 0    metFORMIN (GLUCOPHAGE-XR) 500 mg 24 hr tablet, TAKE 2 TABLETS BY MOUTH TWICE A DAY WITH FOOD, Disp: 360 tablet, Rfl: 2    Multiple Vitamins-Minerals (MULTIVITAMIN WITH MINERALS) tablet, Take 1 tablet by mouth daily, Disp: , Rfl:     nystatin (MYCOSTATIN) cream, Apply topically as needed, Disp: , Rfl:     nystatin (MYCOSTATIN) powder, , Disp: , Rfl:     ondansetron (ZOFRAN) 4 mg tablet, Take 1 tablet (4 mg total) by mouth every 6 (six) hours, Disp: 12 tablet, Rfl: 0    Probiotic Product (ALIGN) 4 MG CAPS, Take by mouth, Disp: , Rfl:     rimegepant sulfate (Nurtec) 75 mg TBDP, Take one NURTEC 75 mg at onset under tongue. Limit 1 in 24 hours. 8 a month., Disp: 8 tablet, Rfl: 3    rosuvastatin (CRESTOR) 5 mg tablet, Take 5 mg by mouth daily, Disp: , Rfl:     zolpidem (AMBIEN) 5 mg tablet, Take 0.5 tablets (2.5 mg total) by mouth daily at bedtime as needed, Disp: 30 tablet, Rfl:     Cholecalciferol 50 MCG (2000 UT) CAPS, Take 500 Units by mouth (Patient not taking: Reported on 3/25/2024), Disp: , Rfl:     Continuous Blood Gluc Sensor (FreeStyle Ankit 14 Day Sensor) MISC, Use 1 Device every 14 (fourteen) days, Disp: 6 each, Rfl: 0    fluconazole (DIFLUCAN) 200 mg tablet, , Disp: , Rfl:     predniSONE 10 mg tablet, Take 30 mg daily for 3 days. Then take 20 mg daily for 3 days. Then take 10 mg daily for 4 days. Then stop. (Patient not taking: Reported on 3/25/2024), Disp: 19 tablet, Rfl: 0    vitamin B-12 (VITAMIN B-12) 1,000 mcg tablet, Take 5,000 mcg by mouth daily Takes 1 daily (Patient not taking: Reported on 3/25/2024), Disp: , Rfl:   Review of Systems   Constitutional:  Negative for activity change, appetite change, fatigue and unexpected  "weight change.   HENT:  Negative for trouble swallowing.    Eyes:  Negative for visual disturbance.   Respiratory:  Negative for shortness of breath.    Cardiovascular:  Negative for chest pain and palpitations.   Gastrointestinal:  Negative for constipation and diarrhea.   Endocrine: Negative for polydipsia and polyuria.   Musculoskeletal:  Positive for arthralgias (knee).   Neurological:  Negative for numbness.   Psychiatric/Behavioral: Negative.         Physical Exam:  Body mass index is 45.1 kg/m².  /76   Pulse 79   Ht 5' 5\" (1.651 m)   Wt 123 kg (271 lb)   SpO2 97%   BMI 45.10 kg/m²    Wt Readings from Last 3 Encounters:   08/26/24 123 kg (271 lb)   06/10/24 120 kg (264 lb)   04/25/24 120 kg (264 lb)       Physical Exam  Vitals and nursing note reviewed.   Constitutional:       Appearance: She is well-developed.   HENT:      Head: Normocephalic.   Eyes:      General: No scleral icterus.     Extraocular Movements: EOM normal.   Neck:      Thyroid: No thyromegaly.   Cardiovascular:      Rate and Rhythm: Normal rate and regular rhythm.      Pulses:           Radial pulses are 2+ on the right side and 2+ on the left side.      Heart sounds: No murmur heard.  Pulmonary:      Effort: Pulmonary effort is normal. No respiratory distress.      Breath sounds: Normal breath sounds. No wheezing.   Musculoskeletal:      Cervical back: Neck supple.   Skin:     General: Skin is warm and dry.   Neurological:      Mental Status: She is alert.   Psychiatric:         Mood and Affect: Mood and affect normal.       Patient's shoes and socks were not removed.          Labs:   Component      Latest Ref Rng 2/1/2024 4/19/2024 8/23/2024   Sodium      135 - 147 mmol/L  137  137    Potassium      3.5 - 5.3 mmol/L  4.5  4.6    Chloride      96 - 108 mmol/L  100  98    Carbon Dioxide      21 - 32 mmol/L  29  29    ANION GAP      4 - 13 mmol/L  8  10    BUN      5 - 25 mg/dL  22  23    Creatinine      0.60 - 1.30 mg/dL  0.77  " 0.82    GLUCOSE, FASTING      65 - 99 mg/dL  184 (H)  167 (H)    Calcium      8.4 - 10.2 mg/dL  9.2  9.8    AST      13 - 39 U/L  16     ALT      7 - 52 U/L  18     ALK PHOS      34 - 104 U/L  56     Total Protein      6.4 - 8.4 g/dL  7.1     Albumin      3.5 - 5.0 g/dL  4.0     Total Bilirubin      0.20 - 1.00 mg/dL  0.42     GFR, Calculated      ml/min/1.73sq m  83  76    EXT Creatinine Urine 55.5 (E)     Albumin,U,Random 13.9 (E)     Albumin Creat Ratio 25.0 (E)     Hemoglobin A1C      Normal 4.0-5.6%; PreDiabetic 5.7-6.4%; Diabetic >=6.5%; Glycemic control for adults with diabetes <7.0% % 7.7 (E)  8.2 (H)    eAG, EST AVG Glucose      mg/dl   189    TSH 3RD GENERATON      0.450 - 4.500 uIU/mL  1.875     Vitamin B-12      180 - 914 pg/mL   467       Legend:  (H) High  (E) External lab result      Plan:    Diagnoses and all orders for this visit:    Type 2 diabetes mellitus with hyperglycemia, without long-term current use of insulin (HCC)  HGA1C 8.2%. Worsened.  Treatment regimen: increase acarbose to 25 mg TID with meals. Continue other treatments.   Episodes of hypoglycemia can lead to permanent disability and death.  Discussed risks/complications associated with uncontrolled diabetes.    Advised to adhere to diabetic diet, and recommended staying active/exercising routinely as tolerated.  Keep carbohydrates consistent to limit blood glucose fluctuations.  Advised to call if blood sugars less than 70 mg/dl or over 300 mg/dl.   Check blood glucose 3+ times a day  Discussed symptoms and treatment of hypoglycemia.   Discussed use of CGM to collect additional blood glucose data to reveal trends and patterns that can be used to optimize treatment plan.   Recommended routine follow-up with podiatry and ophthalmology.   Call to download Familink in 2 weeks.   Ordered blood work to complete prior to next visit.  -     acarbose (PRECOSE) 25 mg tablet; Take one tablet with breakfast, lunch, and dinner  -     Hemoglobin A1C;  Future  -     Albumin / creatinine urine ratio; Future  -     Basic metabolic panel; Future    Essential hypertension  Blood pressure well controlled, continue current treatment     Mixed hyperlipidemia  LDL previously 64  On statin therapy   Managed by PCP     Thyroid nodule incidentally noted on imaging study  Followed by ENT  2 cm right mid gland nodule  Biopsy previously benign   Has repeat US scheduled     Type 2 diabetes mellitus with diabetic nephropathy, without long-term current use of insulin (HCC)                   Discussed with the patient diagnosis and treatment and all questions fully answered. She will call me if any problems arise.    Counseled patient on diagnostic results, prognosis, risk and benefit of treatment options, instruction for management, importance of treatment compliance, risk factor reduction and impressions.      Brenda Mary PA-C

## 2024-08-26 NOTE — PATIENT INSTRUCTIONS
Hypoglycemia instructions   Zuly Queen  8/26/2024  [id number]    Low Blood Sugar    Steps to treat low blood sugar.    1. Test blood sugar if you have symptoms of low blood sugar:   Low Blood Sugar Symptoms:  o Sweaty  o Dizzy  o Rapid heartbeat  o Shaky  o Bad mood  o Hungry      2. Treat blood sugar less than 70 with 15 grams of fast-acting carbohydrate:   Examples of 15 grams Fast-Acting Carbohydrate:  o 4 oz juice  o 4 oz regular soda  o 3-4 glucose tablets (chew)  o 3-4 hard candies (chew)          3.  Wait 15 minutes and test your blood sugar again     4. If blood sugar is less than 100, repeat steps 2-3.    5. When your blood sugar is 100 or more, eat a snack if it will be longer than one hour until your next meal. The snack should be 15 grams of carbohydrate and a protein:   Examples of snacks:  o ½ sandwich  o 6 crackers with cheese  o Piece of fruit with cheese or peanut butter  o 6 crackers with peanut butter

## 2024-09-03 DIAGNOSIS — E11.65 UNCONTROLLED TYPE 2 DIABETES MELLITUS WITH HYPERGLYCEMIA (HCC): ICD-10-CM

## 2024-09-04 ENCOUNTER — HOSPITAL ENCOUNTER (OUTPATIENT)
Dept: ULTRASOUND IMAGING | Facility: HOSPITAL | Age: 64
Discharge: HOME | End: 2024-09-04
Payer: COMMERCIAL

## 2024-09-04 DIAGNOSIS — E04.1 THYROID NODULE: ICD-10-CM

## 2024-09-04 DIAGNOSIS — K11.8 SUBMANDIBULAR GLAND MASS: ICD-10-CM

## 2024-09-04 PROCEDURE — 76536 US EXAM OF HEAD AND NECK: CPT

## 2024-09-04 RX ORDER — METFORMIN HCL 500 MG
TABLET, EXTENDED RELEASE 24 HR ORAL
Qty: 360 TABLET | Refills: 1 | Status: SHIPPED | OUTPATIENT
Start: 2024-09-04

## 2024-09-10 DIAGNOSIS — E11.65 TYPE 2 DIABETES MELLITUS WITH HYPERGLYCEMIA, WITHOUT LONG-TERM CURRENT USE OF INSULIN (HCC): ICD-10-CM

## 2024-09-10 RX ORDER — BLOOD-GLUCOSE SENSOR
EACH MISCELLANEOUS
Qty: 6 EACH | Refills: 1 | Status: SHIPPED | OUTPATIENT
Start: 2024-09-10

## 2024-09-10 NOTE — TELEPHONE ENCOUNTER
2 weeks ago Brenda Mary PA-C [facility] [place] [facility] [place]

## 2024-09-10 NOTE — TELEPHONE ENCOUNTER
Reason for call:   [x] Refill   [] Prior Auth  [] Other:     Office:   [] PCP/Provider -   [x] Specialty/Provider - Brenda Mary PA-C / [facility] [place]    Medication: Continuous Glucose Sensor (FreeStyle Ankit 3 Sensor) MISC / CHANGE 1 SENSOR EVERY 14 DAYS     Pharmacy: [id number] - [place] - [address] [place] [address]     Does the patient have enough for 3 days?   [] Yes   [x] No - Send as HP to POD

## 2024-10-07 ENCOUNTER — OFFICE VISIT (OUTPATIENT)
Dept: NEUROLOGY | Facility: CLINIC | Age: 64
End: 2024-10-07
Payer: COMMERCIAL

## 2024-10-07 VITALS
SYSTOLIC BLOOD PRESSURE: 120 MMHG | HEIGHT: 65 IN | DIASTOLIC BLOOD PRESSURE: 70 MMHG | WEIGHT: 268.8 LBS | HEART RATE: 76 BPM | BODY MASS INDEX: 44.79 KG/M2

## 2024-10-07 DIAGNOSIS — G43.009 MIGRAINE WITHOUT AURA AND WITHOUT STATUS MIGRAINOSUS, NOT INTRACTABLE: Primary | ICD-10-CM

## 2024-10-07 DIAGNOSIS — G47.33 OBSTRUCTIVE SLEEP APNEA (ADULT) (PEDIATRIC): ICD-10-CM

## 2024-10-07 DIAGNOSIS — E11.65 TYPE 2 DIABETES MELLITUS WITH HYPERGLYCEMIA, WITHOUT LONG-TERM CURRENT USE OF INSULIN (HCC): ICD-10-CM

## 2024-10-07 DIAGNOSIS — F41.9 ANXIETY DISORDER, UNSPECIFIED TYPE: ICD-10-CM

## 2024-10-07 DIAGNOSIS — E66.01 MORBID OBESITY (HCC): ICD-10-CM

## 2024-10-07 PROCEDURE — 99214 OFFICE O/P EST MOD 30 MIN: CPT | Performed by: STUDENT IN AN ORGANIZED HEALTH CARE EDUCATION/TRAINING PROGRAM

## 2024-10-07 RX ORDER — BLOOD-GLUCOSE SENSOR
EACH MISCELLANEOUS
Qty: 6 EACH | Refills: 1 | Status: SHIPPED | OUTPATIENT
Start: 2024-10-07

## 2024-10-07 RX ORDER — DEXAMETHASONE 2 MG/1
TABLET ORAL
Qty: 5 TABLET | Refills: 2 | Status: SHIPPED | OUTPATIENT
Start: 2024-10-07

## 2024-10-07 RX ORDER — AMITRIPTYLINE HYDROCHLORIDE 10 MG/1
10 TABLET ORAL
Qty: 30 TABLET | Refills: 6 | Status: SHIPPED | OUTPATIENT
Start: 2024-10-07

## 2024-10-07 NOTE — PATIENT INSTRUCTIONS
Headache Calendar  Please maintain a headache calendar  Consider using phone applications such as Migraine Blu or Migraine Diary    Headache/migraine treatment:     Rescue medications (for immediate treatment of a headache):   It is ok to take ibuprofen, acetaminophen or naproxen (Advil, Tylenol,  Aleve, Excedrin) if they help your headaches you should limit these to No more than 3 times a week to avoid medication overuse/rebound headaches.     For your more moderate to severe migraines take this medication early   - Continue Nurtec 75 mg, take 1 tablet at the onset of a migraine headache.  Max dose: 75 mg per 24 hours    Rescue  Dexamethasone 2mg tablet daily for up to 5 days (take in the morning)    Prescription preventive medications for headaches/migraines   Amitriptyline 10mg at bedtime    Lifestyle Recommendations:  [x] SLEEP - Maintain a regular sleep schedule: Adults need at least 7-8 hours of uninterrupted a night. Maintain good sleep hygiene:  Going to bed and waking up at consistent times, avoiding excessive daytime naps, avoiding caffeinated beverages in the evening, avoid excessive stimulation in the evening and generally using bed primarily for sleeping.  One hour before bedtime would recommend turning lights down lower, decreasing your activity (may read quietly, listen to music at a low volume). When you get into bed, should eliminate all technology (no texting, emailing, playing with your phone, iPad or tablet in bed).  [x] HYDRATION - Maintain good hydration.  Drink  2L of fluid a day (4 typical small water bottles)  [x] DIET - Maintain good nutrition. In particular don't skip meals and try and eat healthy balanced meals regularly.  [x] TRIGGERS - Look for other triggers and avoid them: Limit caffeine to 1-2 cups a day or less. Avoid dietary triggers that you have noticed bring on your headaches (this could include aged cheese, peanuts, MSG, aspartame and nitrates).  [x] EXERCISE - physical  exercise as we all know is good for you in many ways, and not only is good for your heart, but also is beneficial for your mental health, cognitive health and  chronic pain/headaches. I would encourage at the least 5 days of physical exercise weekly for at least 30 minutes.     Education and Follow-up  [x] Please call with any questions or concerns. Of course if any new concerning symptoms go to the emergency department.  [x] Follow up in 6 months with [facility]

## 2024-10-07 NOTE — TELEPHONE ENCOUNTER
Patient had to have script transferred to [facility] due to shortage but now needs a new script sent back to [facility] since they do have it in stock.     Reason for call:   [x] Refill   [] Prior Auth  [] Other:     Office:   [] PCP/Provider -   [x] Specialty/Provider - Endo    Medication:   Continuous Glucose Sensor (Freestyle marija 3 sensor)- Change 1 sensor every 14 days      Pharmacy: [facility]    Does the patient have enough for 3 days?   [] Yes   [x] No - Send as HP to POD

## 2024-10-07 NOTE — PROGRESS NOTES
Neurology Ambulatory Visit  Name: Zuly Queen       : 1960       MRN: [id number]   Encounter Provider: Faustino Pedroza DO   Encounter Date: 10/7/2024  Encounter department: [facility]    Zuly Queen is a delightful 64 y.o. female with a past medical history that includes DM2, HTN, HLD, vertigo, obesity here for f/u evaluation of headache.     Assessment and Plan  1. Migraine without aura and without status migrainosus, not intractable  Assessment & Plan:  At today's visit, she reports that she is generally doing well.  She endorses about 1 headache day per month and it is typically not severe.  She had a prolonged episode of migraine in August, but possibly attributes that to an underlying illness that presented itself following that span of 3 days.  She would like to continue with amitriptyline 10 mg at bedtime for now, but at her follow-up in 6 months if she is still doing well it is reasonable to consider tapering off it.  From an abortive standpoint, I will have her continue with Nurtec since it typically works well for her, but I did give her a prescription for dexamethasone to be used as rescue in the event of a prolonged migraine.  Orders:  -     dexamethasone (DECADRON) 2 mg tablet; One tab with breakfast for 1-5 days for unrelenting migraine  -     amitriptyline (ELAVIL) 10 mg tablet; Take 1 tablet (10 mg total) by mouth daily at bedtime  2. Obstructive sleep apnea (adult) (pediatric)  3. Anxiety disorder, unspecified type  4. Morbid obesity (HCC)      She will Return in about 6 months (around 2025).    Workup  - MRI brain without contrast 2022: No acute intracranial findings.  White matter changes.  - Home sleep study 2023: Mild obstructive sleep apnea     Preventative:  - we discussed headache hygiene and lifestyle factors that may improve headaches  - Amitriptyline 10mg HS  - Currently on through other providers: Cymbalta, Lisinopril  - Past/  failed/contraindicated: None  - future options: CGRP med, botox     Acute:  - discussed not taking over-the-counter or prescription pain medications more than 2-3 days per week to prevent medication overuse/rebound headache  - Nurtec 75mg ODT  - Currently on through other providers: None  - Past/ failed/contraindicated: Avoid triptans for the time being out of concern for possible TIA  - future options: prochlorperazine, Toradol IM or p.o., could consider trial of 5 days of Depakote 500 mg nightly or dexamethasone 2 mg daily for prolonged migraine, ubrelvy, reyvow, zavzpret    Rescue:  - Dexamethasone 2mg tablet daily for up to 5 days    History of Present Illness       Interval updates:  10/7/24: Since our last visit, she was seen by Dillan in April 2024.  At that time, she was doing fairly well and the plan was to continue with amitriptyline at a low dose of 10 mg nightly and have Nurtec available as needed for the more severe episodes. At today's visit, she endorses about 1/30 headache days per month of which 0 are more severe. She did have to go to the ER on 8/20 for a migraine on and off for about 3 days. She is taking amitriptyline 10 mg nightly without any significant issues.  From an abortive standpoint, Nurtec typically works well.     Prior History  8/2/23: At today's visit, she reports that she is doing exceptionally well.  Continues to take amitriptyline daily without any issues, but is interested in decreasing the dose.  States that over the last month she has essentially been headache free and typically only experiences about 1 headache day per month at this time. She has only taken Nurtec once and reports that it worked well.   2/2/23: Since her last visit, she reports no significant improvement in terms of her headaches.  Currently experiencing about 8 headache days per month. Reports that they don't last as long. Continuing to take Amitriptyline 25mg daily, but notes some ongoing grogginess with it in  "the morning. She saw her ENT recently who recommended that she stay on it for at least 6 months. They recommended that she started Mg supplements. Nurtec works well for her, but has only taken it once.   10/25/22: Ms. Queen reports a history of headaches that have been ongoing since she was in her 20s.  She had a period of time where she was mostly headache-free but since her COVID infection in May of this year she reports recurrence of headaches that started around July.  At that same time she was also having ringing/motor sounds in her ear as well as periodic episodes of dizziness.  She has been evaluated by ENT and PT of been treating her for suspected Menières.  She was also evaluated in the hospital during an episode and there was concern for TIA versus migraine related symptoms.  Based on her previous history and current description of her headaches I suspect that hospital visit was related to migraine and less likely TIA.  She reports that she is doing well at the moment and has been continuing to improve especially over the last month.  We decided that it would be best to continue amitriptyline 25 mg for the time being but I will also have her try Nurtec as an abortive for the more moderate to severe episodes.  Avoiding triptans out of concern for this possible TIA recently.  Finally, she is pending a sleep study next month which may be beneficial if she continues to have sleep apnea as she was previously diagnosed with for her bariatric surgery.         Objective     Physical Exam:                                                               Vitals:            Constitutional:    /70 (BP Location: Right arm, Patient Position: Sitting, Cuff Size: Large)   Pulse 76   Ht 5' 5\" (1.651 m)   Wt 122 kg (268 lb 12.8 oz)   BMI 44.73 kg/m²   BP Readings from Last 3 Encounters:   10/07/24 120/70   08/26/24 116/76   08/20/24 145/72     Pulse Readings from Last 3 Encounters:   10/07/24 76   08/26/24 79 "   08/20/24 65         Well developed, well nourished, well groomed. No dysmorphic features.       HEENT:  Normocephalic atraumatic. See neuro exam   Chest:  Respirations appear regular and unlabored.    Cardiovascular:  no observed significant swelling.    Musculoskeletal:  (see below under neurologic exam for evaluation of motor function and gait)   Skin:  warm and dry, not diaphoretic.    Psychiatric:  Normal behavior and appropriate affect       Neurological Examination:     Mental status/cognitive function:   Recent and remote memory intact. Attention span and concentration as well as fund of knowledge are appropriate for age. Normal language and spontaneous speech.  Cranial Nerves:  III, IV, VI-Pupils were equal, round. Extraocular movements were full and conjugate   VII-facial expression symmetric  VIII-hearing grossly intact bilaterally   Motor Exam: symmetric bulk throughout. no atrophy, fasciculations or abnormal movements noted.   Coordination:  no apparent dysmetria, ataxia or tremor noted  Gait: steady casual gait    I have spent 15 minutes with the patient today in which greater than 50% of this time was spent in counseling/coordination of care regarding Prognosis, Risks and benefits of tx options, Patient and family education, Importance of tx compliance, Impressions, Documenting in the medical record, Reviewing / ordering tests, medicine, procedures  , and Obtaining or reviewing history  . I also spent 15 minutes non face to face for this patient the same day.     Activity Minutes   Precharting/reviewing 10   Patient care/counseling 15   Postcharting/care coordination 5       Voice recognition software was used in the generation of this note. There may be unintentional errors including grammatical errors, spelling errors, or pronoun errors.

## 2024-10-07 NOTE — ASSESSMENT & PLAN NOTE
At today's visit, she reports that she is generally doing well.  She endorses about 1 headache day per month and it is typically not severe.  She had a prolonged episode of migraine in August, but possibly attributes that to an underlying illness that presented itself following that span of 3 days.  She would like to continue with amitriptyline 10 mg at bedtime for now, but at her follow-up in 6 months if she is still doing well it is reasonable to consider tapering off it.  From an abortive standpoint, I will have her continue with Nurtec since it typically works well for her, but I did give her a prescription for dexamethasone to be used as rescue in the event of a prolonged migraine.

## 2024-10-07 NOTE — PROGRESS NOTES
Since your last visit are your headaches Worsened    Any change to the headache type?    What is your current headache frequency (total headache days out of 30): 3/30    Are you taking your current medications as prescribed? yes      Do you have any side effects? no

## 2024-10-16 DIAGNOSIS — E11.65 TYPE 2 DIABETES MELLITUS WITH HYPERGLYCEMIA, WITHOUT LONG-TERM CURRENT USE OF INSULIN (HCC): ICD-10-CM

## 2024-10-16 RX ORDER — DAPAGLIFLOZIN 10 MG/1
10 TABLET, FILM COATED ORAL DAILY
Qty: 30 TABLET | Refills: 5 | Status: SHIPPED | OUTPATIENT
Start: 2024-10-16

## 2024-10-16 RX ORDER — GLIPIZIDE 2.5 MG/1
2.5 TABLET, EXTENDED RELEASE ORAL 2 TIMES DAILY
Qty: 180 TABLET | Refills: 1 | Status: SHIPPED | OUTPATIENT
Start: 2024-10-16

## 2024-12-30 DIAGNOSIS — E11.65 TYPE 2 DIABETES MELLITUS WITH HYPERGLYCEMIA, WITHOUT LONG-TERM CURRENT USE OF INSULIN (HCC): ICD-10-CM

## 2024-12-31 RX ORDER — ACARBOSE 25 MG/1
TABLET ORAL
Qty: 270 TABLET | Refills: 1 | Status: SHIPPED | OUTPATIENT
Start: 2024-12-31

## 2025-01-14 DIAGNOSIS — E11.65 UNCONTROLLED TYPE 2 DIABETES MELLITUS WITH HYPERGLYCEMIA (HCC): ICD-10-CM

## 2025-01-15 RX ORDER — METFORMIN HYDROCHLORIDE 500 MG/1
1000 TABLET, EXTENDED RELEASE ORAL 2 TIMES DAILY WITH MEALS
Qty: 360 TABLET | Refills: 1 | Status: SHIPPED | OUTPATIENT
Start: 2025-01-15

## 2025-02-21 LAB
CREAT ?TM UR-SCNC: 36.7 UMOL/L
EXT ALBUMIN URINE RANDOM: 13.9
HBA1C MFR BLD HPLC: 8.6 %
MICROALBUMIN/CREAT UR: 37.9 MG/G{CREAT}

## 2025-03-22 DIAGNOSIS — E11.65 TYPE 2 DIABETES MELLITUS WITH HYPERGLYCEMIA, WITHOUT LONG-TERM CURRENT USE OF INSULIN (HCC): ICD-10-CM

## 2025-03-23 DIAGNOSIS — E11.65 TYPE 2 DIABETES MELLITUS WITH HYPERGLYCEMIA, WITHOUT LONG-TERM CURRENT USE OF INSULIN (HCC): ICD-10-CM

## 2025-03-24 RX ORDER — GLIPIZIDE 2.5 MG/1
2.5 TABLET, EXTENDED RELEASE ORAL 2 TIMES DAILY
Qty: 180 TABLET | Refills: 0 | OUTPATIENT
Start: 2025-03-24

## 2025-03-24 RX ORDER — GLIPIZIDE 2.5 MG/1
2.5 TABLET, EXTENDED RELEASE ORAL 2 TIMES DAILY
Qty: 180 TABLET | Refills: 0 | Status: SHIPPED | OUTPATIENT
Start: 2025-03-24

## 2025-03-27 DIAGNOSIS — E11.65 TYPE 2 DIABETES MELLITUS WITH HYPERGLYCEMIA, WITHOUT LONG-TERM CURRENT USE OF INSULIN (HCC): ICD-10-CM

## 2025-03-27 RX ORDER — ACYCLOVIR 800 MG/1
TABLET ORAL
Qty: 6 EACH | Refills: 1 | Status: SHIPPED | OUTPATIENT
Start: 2025-03-27

## 2025-04-23 ENCOUNTER — TELEPHONE (OUTPATIENT)
Dept: GASTROENTEROLOGY | Facility: CLINIC | Age: 65
End: 2025-04-23

## 2025-04-23 NOTE — TELEPHONE ENCOUNTER
Pt is due for an EGD for hx of Gastric intestinal metaplasia with Dr Salazar. Called and spoke to pt and informed due for an EGD and then call disconnected. Will call again and will mail recall letter at that time if do not hear back from pt.

## 2025-05-05 DIAGNOSIS — E11.65 TYPE 2 DIABETES MELLITUS WITH HYPERGLYCEMIA, WITHOUT LONG-TERM CURRENT USE OF INSULIN (HCC): ICD-10-CM

## 2025-05-05 DIAGNOSIS — G43.009 MIGRAINE WITHOUT AURA AND WITHOUT STATUS MIGRAINOSUS, NOT INTRACTABLE: ICD-10-CM

## 2025-05-06 RX ORDER — DAPAGLIFLOZIN 10 MG/1
10 TABLET, FILM COATED ORAL DAILY
Qty: 30 TABLET | Refills: 5 | OUTPATIENT
Start: 2025-05-06

## 2025-05-06 RX ORDER — GLIPIZIDE 2.5 MG/1
2.5 TABLET, EXTENDED RELEASE ORAL 2 TIMES DAILY
Qty: 180 TABLET | Refills: 0 | Status: SHIPPED | OUTPATIENT
Start: 2025-05-06

## 2025-05-06 RX ORDER — AMITRIPTYLINE HYDROCHLORIDE 10 MG/1
10 TABLET ORAL
Qty: 30 TABLET | Refills: 0 | Status: SHIPPED | OUTPATIENT
Start: 2025-05-06

## 2025-05-06 RX ORDER — DAPAGLIFLOZIN 10 MG/1
10 TABLET, FILM COATED ORAL DAILY
Qty: 30 TABLET | Refills: 5 | Status: SHIPPED | OUTPATIENT
Start: 2025-05-06

## 2025-05-06 NOTE — TELEPHONE ENCOUNTER
Patient needs an appointment. Please contact the patient to schedule an appointment. Last office visit:  10/7/24-She will Return in about 6 months (around 4/7/2025).  Suman provided

## 2025-06-03 DIAGNOSIS — G43.009 MIGRAINE WITHOUT AURA AND WITHOUT STATUS MIGRAINOSUS, NOT INTRACTABLE: ICD-10-CM

## 2025-06-03 RX ORDER — AMITRIPTYLINE HYDROCHLORIDE 10 MG/1
10 TABLET ORAL
Qty: 30 TABLET | Refills: 0 | Status: SHIPPED | OUTPATIENT
Start: 2025-06-03

## 2025-06-04 DIAGNOSIS — E11.65 TYPE 2 DIABETES MELLITUS WITH HYPERGLYCEMIA, WITHOUT LONG-TERM CURRENT USE OF INSULIN (HCC): ICD-10-CM

## 2025-06-04 DIAGNOSIS — E11.65 UNCONTROLLED TYPE 2 DIABETES MELLITUS WITH HYPERGLYCEMIA (HCC): ICD-10-CM

## 2025-06-05 RX ORDER — ACARBOSE 25 MG/1
TABLET ORAL
Qty: 270 TABLET | Refills: 1 | Status: SHIPPED | OUTPATIENT
Start: 2025-06-05

## 2025-06-05 RX ORDER — METFORMIN HYDROCHLORIDE 500 MG/1
1000 TABLET, EXTENDED RELEASE ORAL 2 TIMES DAILY WITH MEALS
Qty: 360 TABLET | Refills: 1 | Status: SHIPPED | OUTPATIENT
Start: 2025-06-05

## 2025-06-23 DIAGNOSIS — G43.009 MIGRAINE WITHOUT AURA AND WITHOUT STATUS MIGRAINOSUS, NOT INTRACTABLE: ICD-10-CM

## 2025-06-24 RX ORDER — AMITRIPTYLINE HYDROCHLORIDE 10 MG/1
10 TABLET ORAL
Qty: 30 TABLET | Refills: 0 | Status: SHIPPED | OUTPATIENT
Start: 2025-06-24

## 2025-07-05 DIAGNOSIS — E11.65 TYPE 2 DIABETES MELLITUS WITH HYPERGLYCEMIA, WITHOUT LONG-TERM CURRENT USE OF INSULIN (HCC): ICD-10-CM

## 2025-07-08 ENCOUNTER — OFFICE VISIT (OUTPATIENT)
Dept: ENDOCRINOLOGY | Facility: CLINIC | Age: 65
End: 2025-07-08
Payer: COMMERCIAL

## 2025-07-08 VITALS
BODY MASS INDEX: 44.82 KG/M2 | WEIGHT: 269 LBS | HEIGHT: 65 IN | DIASTOLIC BLOOD PRESSURE: 72 MMHG | SYSTOLIC BLOOD PRESSURE: 118 MMHG | HEART RATE: 80 BPM | OXYGEN SATURATION: 97 %

## 2025-07-08 DIAGNOSIS — E66.01 MORBID OBESITY (HCC): ICD-10-CM

## 2025-07-08 DIAGNOSIS — E11.65 TYPE 2 DIABETES MELLITUS WITH HYPERGLYCEMIA, WITHOUT LONG-TERM CURRENT USE OF INSULIN (HCC): Primary | ICD-10-CM

## 2025-07-08 DIAGNOSIS — E78.2 MIXED HYPERLIPIDEMIA: ICD-10-CM

## 2025-07-08 DIAGNOSIS — E04.1 THYROID NODULE: ICD-10-CM

## 2025-07-08 DIAGNOSIS — I10 ESSENTIAL HYPERTENSION: ICD-10-CM

## 2025-07-08 LAB — SL AMB POCT HEMOGLOBIN AIC: 8.8 (ref ?–6.5)

## 2025-07-08 PROCEDURE — 99214 OFFICE O/P EST MOD 30 MIN: CPT | Performed by: PHYSICIAN ASSISTANT

## 2025-07-08 PROCEDURE — 83036 HEMOGLOBIN GLYCOSYLATED A1C: CPT | Performed by: PHYSICIAN ASSISTANT

## 2025-07-08 PROCEDURE — 95251 CONT GLUC MNTR ANALYSIS I&R: CPT | Performed by: PHYSICIAN ASSISTANT

## 2025-07-08 RX ORDER — GLIPIZIDE 5 MG/1
5 TABLET, FILM COATED, EXTENDED RELEASE ORAL 2 TIMES DAILY WITH MEALS
Qty: 180 TABLET | Refills: 1 | Status: SHIPPED | OUTPATIENT
Start: 2025-07-08

## 2025-07-08 RX ORDER — DAPAGLIFLOZIN 10 MG/1
10 TABLET, FILM COATED ORAL DAILY
Qty: 90 TABLET | Refills: 1 | Status: SHIPPED | OUTPATIENT
Start: 2025-07-08

## 2025-07-08 RX ORDER — GLIPIZIDE 2.5 MG/1
TABLET, EXTENDED RELEASE ORAL DAILY
COMMUNITY
End: 2025-07-08

## 2025-07-08 RX ORDER — CALCIUM CARBONATE/VITAMIN D3 500 MG-10
TABLET,CHEWABLE ORAL
COMMUNITY

## 2025-07-08 RX ORDER — GLIPIZIDE 2.5 MG/1
2.5 TABLET, EXTENDED RELEASE ORAL 2 TIMES DAILY
Qty: 180 TABLET | Refills: 0 | OUTPATIENT
Start: 2025-07-08

## 2025-07-08 RX ORDER — GLUCOSAMINE/D3/BOSWELLIA SERRA 1500MG-400
TABLET ORAL
COMMUNITY

## 2025-07-08 NOTE — ASSESSMENT & PLAN NOTE
Unchanged right mid gland thyroid nodule and right submandibular lymph node with prior nonmalignant biopsy results of each. Based on 2015 HARISH guidelines, additional follow-up ultrasound should be performed in 12 to 24 months.  Repeat US prior to next visit   Orders:    US thyroid; Future    TSH, 3rd generation with Free T4 reflex; Future

## 2025-07-08 NOTE — PROGRESS NOTES
Name: Zuly Queen      : 1960      MRN: [id number]  Encounter Provider: Brenda Mary PA-C  Encounter Date: 2025   Encounter department: [facility] [place]    CC: DM  Assessment & Plan  Type 2 diabetes mellitus with hyperglycemia, without long-term current use of insulin (HCC)  HGA1C 8.8%. Worsened.  Treatment regimen: increase glipizide to 5 mg BID. Continue other treatments and dietary changes.  Discussed intensive insulin regimen does increase risk for hypoglycemia. Episodes of hypoglycemia can lead to permanent disability and death.  Discussed risks/complications associated with uncontrolled diabetes.    Advised to adhere to diabetic diet, and recommended staying active/exercising routinely as tolerated  Keep carbohydrates consistent to limit blood glucose fluctuations.  Advised to call if blood sugars less than 70 mg/dl or over 300 mg/dl.   Check blood glucose 3+ times a day  Discussed symptoms and treatment of hypoglycemia.   Discussed use of CGM to collect additional blood glucose data to reveal trends and patterns that can be used to optimize treatment plan.   Recommended routine follow-up with podiatry and ophthalmology.   Ordered blood work to complete prior to next visit.    Lab Results   Component Value Date    HGBA1C 8.8 (A) 2025       Orders:    POCT hemoglobin A1c    Hemoglobin A1C; Future    Albumin / creatinine urine ratio; Future    Basic metabolic panel; Future    glipiZIDE (GLUCOTROL XL) 5 mg 24 hr tablet; Take 1 tablet (5 mg total) by mouth 2 (two) times a day with meals    dapagliflozin (Farxiga) 10 MG tablet; Take 1 tablet (10 mg total) by mouth daily    Essential hypertension  Blood pressure adequately controlled, continue current treatment       Mixed hyperlipidemia  LDL previously 68  On statin therapy  Managed by PCP        Thyroid nodule  Unchanged right mid gland thyroid nodule and right submandibular lymph node with prior  nonmalignant biopsy results of each. Based on 2015 HARISH guidelines, additional follow-up ultrasound should be performed in 12 to 24 months.  Repeat US prior to next visit   Orders:    US thyroid; Future    TSH, 3rd generation with Free T4 reflex; Future    Morbid obesity (HCC)  Cannot take GLP1 agonists due to hx of pancreatitis  Recommended healthy diet and routine exercise as tolerated  Referred to weight management   Orders:    Ambulatory Referral to Weight Management; Future        Pertinent Medical History   Pancreatitis from [place]        History of Present Illness     Zuly Queen is a 64 y.o. female with a history of type 2 diabetes without long term use of insulin. She has been off of insulin since her gastric sleeve surgery.  Diabetes course has been stable. No recent illness or hospitalization. Denies any issues with her current regimen. Home glucose monitoring: are performed regularly, using [employer].      Average glucose 197 mg/dl  In target range 38%, above range 62%, below range 0%     Current regimen: metformin 1000 mg BID, Farxiga 10 mg daily, glipizide 2.5 mg BID, acarbose 25 mg with [id number]  compliant most of the time, denies any side effects from medications.  Hypoglycemic episodes: No, rare  H/o of hypoglycemia causing hospitalization or Intervention such as glucagon injection or ambulance call:  No  Hypoglycemia symptoms: jitteriness  Treatment of hypoglycemia: discussed treatment     Medic alert tag: recommended: Yes     Diabetes education: Not recently  Diet: 3 meals per day, 2-3 snacks per day. Timing of meals is predictable.  Diabetic diet compliance: she does try to portion control her carbs at meals but admits to having sweets   Activity: Daily activity is predictable: Yes. No routine exercise.        Has hypertension: on ACE inhibitor/ARB, compliant most of the time  Has hyperlipidemia: on statin - tolerating well, no myalgias. compliant most of the time, denies any  side effects from medications.     Thyroid disorders: Thyroid nodule. Previously saw ENT.  US done Sept 2024.  Biopsy done in December 2023 of the right mid thyroid nodule and submandibular lymph node were negative for malignancy.   FINDINGS:  Thyroid texture: Normal homogeneous smooth echotexture.     Right lobe: 5.5 x 2.4 x 2.4 cm. Volume 15.4 mL  Left lobe: 4.2 x 1.2 x 1.4 cm. Volume 3.5 mL  Isthmus: 0.5 cm.     Nodule #1. Image 7.  RIGHT midgland nodule measuring 2.3 x 1.1 x 1.5 cm, 1.8 mL (previously 2.0 x 1.2 x 1.5 cm, 1.8 mL). Unchanged from prior.  COMPOSITION: 2 points, solid or almost completely solid.  ECHOGENICITY: 2 points, hypoechoic.  SHAPE: 0 points, wider-than-tall.  MARGIN: 0 points, smooth.  ECHOGENIC FOCI: 3 points, punctate echogenic foci.  TI-RADS Classification: TR 5 (7 or > points), Highly suspicious. FNA if >/= 1 cm. Follow if >/= 0.5 cm. This nodule has had a previous benign biopsy (12/1/2023 yielding [place] Category II). As it is stable, no further sampling recommended at this time.  Further surveillance at 2 year intervals could be considered to confirm continued stability for a period of greater than 5 years.     Right level 2 submandibular lymph node measures 1.4 x 0.7 x 1.3 cm (previously 1.3 x 0.7 x 1.4 cm); this has prior benign biopsy.     IMPRESSION:     Unchanged right mid gland thyroid nodule and right submandibular lymph node with prior nonmalignant biopsy results of each. Based on 2015 HARISH guidelines, additional follow-up ultrasound should be performed in 12 to 24 months.     History of pancreatitis: Yes (from [place])    Review of Systems   Constitutional:  Negative for activity change, appetite change, fatigue and unexpected weight change.   HENT:  Negative for trouble swallowing.    Eyes:  Negative for visual disturbance.   Respiratory:  Negative for shortness of breath.    Cardiovascular:  Negative for chest pain and palpitations.   Gastrointestinal:  Negative for  "constipation and diarrhea.        Uses fiber supplement   Endocrine: Negative for polydipsia and polyuria.   Musculoskeletal:  Positive for arthralgias.   Skin:  Negative for wound.   Neurological:  Negative for numbness.   Psychiatric/Behavioral: Negative.      as per HPI    Medications Ordered Prior to Encounter[1]      Medical History Reviewed by provider this encounter:  Tobacco  Allergies  Meds  Problems  Med Hx  Surg Hx  Fam Hx     .    Objective   /72   Pulse 80   Ht 5' 5\" (1.651 m)   Wt 122 kg (269 lb)   SpO2 97%   BMI 44.76 kg/m²      Body mass index is 44.76 kg/m².  Wt Readings from Last 3 Encounters:   07/08/25 122 kg (269 lb)   11/06/24 122 kg (268 lb)   10/07/24 122 kg (268 lb 12.8 oz)     Physical Exam  Vitals and nursing note reviewed.   Constitutional:       Appearance: She is well-developed.   HENT:      Head: Normocephalic.     Eyes:      General: No scleral icterus.    Neck:      Thyroid: No thyromegaly.     Cardiovascular:      Rate and Rhythm: Normal rate and regular rhythm.      Pulses:           Radial pulses are 2+ on the right side and 2+ on the left side.      Heart sounds: No murmur heard.  Pulmonary:      Effort: Pulmonary effort is normal. No respiratory distress.      Breath sounds: Normal breath sounds. No wheezing.     Musculoskeletal:      Cervical back: Neck supple.     Skin:     General: Skin is warm and dry.     Neurological:      Mental Status: She is alert.       Last Eye Exam: Not on file  Last Foot Exam: Not on file  Health Maintenance   Topic Date Due    Diabetic Foot Exam  07/08/2026 (Originally 11/23/2022)    Diabetic Eye Exam  07/08/2026 (Originally 4/14/2017)         Labs: I have reviewed pertinent labs including:   Lab Results   Component Value Date    HGBA1C 8.8 (A) 07/08/2025    HGBA1C 8.6 02/21/2025    HGBA1C 8.2 (H) 08/23/2024      Lab Results   Component Value Date    CREATININE 0.82 08/23/2024    CREATININE 0.77 04/19/2024    CREATININE 0.83 " 09/13/2022    BUN 23 08/23/2024    K 4.6 08/23/2024    CL 98 08/23/2024    CO2 29 08/23/2024      eGFR   Date Value Ref Range Status   08/23/2024 76 ml/min/1.73sq m Final      HDL, Direct   Date Value Ref Range Status   09/13/2022 53 >=50 mg/dL Final     Comment:     Specimen collection should occur prior to Metamizole administration due to the potential for falsley depressed results.     Triglycerides   Date Value Ref Range Status   09/13/2022 153 (H) See Comment mg/dL Final     Comment:     Triglyceride:     0-9Y            <75mg/dL     10Y-17Y         <90 mg/dL       >=18Y     Normal          <150 mg/dL     Borderline High 150-199 mg/dL     High            200-499 mg/dL        Very High       >499 mg/dL    Specimen collection should occur prior to N-Acetylcysteine or Metamizole administration due to the potential for falsely depressed results.        Patient Instructions     Hypoglycemia instructions   Zuly Queen  7/8/2025  [id number]    Low Blood Sugar    Steps to treat low blood sugar.    1. Test blood sugar if you have symptoms of low blood sugar:   Low Blood Sugar Symptoms:  o Sweaty  o Dizzy  o Rapid heartbeat  o Shaky  o Bad mood  o Hungry      2. Treat blood sugar less than 70 with 15 grams of fast-acting carbohydrate:   Examples of 15 grams Fast-Acting Carbohydrate:  o 4 oz juice  o 4 oz regular soda  o 3-4 glucose tablets (chew)  o 3-4 hard candies (chew)          3.  Wait 15 minutes and test your blood sugar again     4. If blood sugar is less than 100, repeat steps 2-3.    5. When your blood sugar is 100 or more, eat a snack if it will be longer than one hour until your next meal. The snack should be 15 grams of carbohydrate and a protein:   Examples of snacks:  o ½ sandwich  o 6 crackers with cheese  o Piece of fruit with cheese or peanut butter  o 6 crackers with peanut butter      Discussed with the patient and all questioned fully answered. She will call me if any problems  arise.    Administrative Statements   I have spent a total time of 30 minutes in caring for this patient on the day of the visit/encounter including Importance of tx compliance, Documenting in the medical record, Reviewing/placing orders in the medical record (including tests, medications, and/or procedures), and Obtaining or reviewing history  .         [1]   Current Outpatient Medications on File Prior to Visit   Medication Sig Dispense Refill    acarbose (PRECOSE) 25 mg tablet TAKE ONE TABLET WITH BREAKFAST, LUNCH, AND DINNER 270 tablet 1    amitriptyline (ELAVIL) 10 mg tablet TAKE 1 TABLET BY MOUTH DAILY AT BEDTIME 30 tablet 0    betamethasone dipropionate 0.05 % lotion Apply topically in the morning and in the evening. To affected area.      Biotin 1000 MCG CHEW Chew 6,000 mcg      Biotin 42772 MCG TABS Take 1/2 tablet daily ORAL      Calcium 500 MG tablet Take by mouth      Calcium Carb-Cholecalciferol 500-10 MG-MCG CHEW 400mc calcium/ vitamin D 12.5mcg ORAL      Continuous Blood Gluc  (FREESTYLE FRANKI 14 DAY READER) SUZY Use      Continuous Glucose Sensor (FreeStyle Franki 3 Sensor) MISC CHANGE 1 SENSOR EVERY 14 DAYS 6 each 1    dexamethasone (DECADRON) 2 mg tablet One tab with breakfast for 1-5 days for unrelenting migraine 5 tablet 2    DULoxetine (CYMBALTA) 30 mg delayed release capsule Take 30 mg by mouth in the morning.      lansoprazole (PREVACID) 15 mg capsule Take 15 mg by mouth in the morning.      lisinopril (ZESTRIL) 2.5 mg tablet Take 2.5 mg by mouth in the morning.      meclizine (ANTIVERT) 25 mg tablet Take 1 tablet (25 mg total) by mouth 3 (three) times a day as needed for dizziness 30 tablet 0    metFORMIN (GLUCOPHAGE-XR) 500 mg 24 hr tablet TAKE 2 TABLETS BY MOUTH TWICE A DAY WITH FOOD 360 tablet 1    Multiple Vitamin (MULTIVITAMIN ADULT PO) Take 2 tablets by mouth daily      Multiple Vitamins-Minerals (MULTIVITAMIN WITH MINERALS) tablet Take 1 tablet by mouth in the morning.       nystatin (MYCOSTATIN) cream Apply topically as needed      nystatin (MYCOSTATIN) powder       Probiotic Product (ALIGN PO) Take 1 tablet by mouth daily      Probiotic Product (ALIGN) 4 MG CAPS Take by mouth      rimegepant sulfate (Nurtec) 75 mg TBDP Take one NURTEC 75 mg at onset under tongue. Limit 1 in 24 hours. 8 a month. 8 tablet 3    rosuvastatin (CRESTOR) 5 mg tablet Take 5 mg by mouth in the morning.      zolpidem (AMBIEN) 5 mg tablet Take 0.5 tablets (2.5 mg total) by mouth daily at bedtime as needed 30 tablet     [DISCONTINUED] dapagliflozin (Farxiga) 10 MG tablet Take 1 tablet (10 mg total) by mouth daily 30 tablet 5    [DISCONTINUED] glipiZIDE (GLUCOTROL XL) 2.5 mg 24 hr tablet TAKE 1 TABLET BY MOUTH TWICE A  tablet 0    [DISCONTINUED] glipiZIDE (GLUCOTROL XL) 2.5 mg 24 hr tablet Take by mouth daily with breakfast      Cholecalciferol 50 MCG (2000 UT) CAPS Take 500 Units by mouth (Patient not taking: Reported on 3/25/2024)      fluconazole (DIFLUCAN) 200 mg tablet  (Patient not taking: Reported on 3/25/2024)      ondansetron (ZOFRAN) 4 mg tablet Take 1 tablet (4 mg total) by mouth every 6 (six) hours (Patient not taking: Reported on 10/7/2024) 12 tablet 0    vitamin B-12 (VITAMIN B-12) 1,000 mcg tablet Take 5,000 mcg by mouth daily Takes 1 daily (Patient not taking: Reported on 3/25/2024)       No current facility-administered medications on file prior to visit.

## 2025-07-08 NOTE — ASSESSMENT & PLAN NOTE
Cannot take GLP1 agonists due to hx of pancreatitis  Recommended healthy diet and routine exercise as tolerated  Referred to weight management   Orders:    Ambulatory Referral to Weight Management; Future

## 2025-07-08 NOTE — ASSESSMENT & PLAN NOTE
HGA1C 8.8%. Worsened.  Treatment regimen: increase glipizide to 5 mg BID. Continue other treatments and dietary changes.  Discussed intensive insulin regimen does increase risk for hypoglycemia. Episodes of hypoglycemia can lead to permanent disability and death.  Discussed risks/complications associated with uncontrolled diabetes.    Advised to adhere to diabetic diet, and recommended staying active/exercising routinely as tolerated  Keep carbohydrates consistent to limit blood glucose fluctuations.  Advised to call if blood sugars less than 70 mg/dl or over 300 mg/dl.   Check blood glucose 3+ times a day  Discussed symptoms and treatment of hypoglycemia.   Discussed use of CGM to collect additional blood glucose data to reveal trends and patterns that can be used to optimize treatment plan.   Recommended routine follow-up with podiatry and ophthalmology.   Ordered blood work to complete prior to next visit.    Lab Results   Component Value Date    HGBA1C 8.8 (A) 07/08/2025       Orders:    POCT hemoglobin A1c    Hemoglobin A1C; Future    Albumin / creatinine urine ratio; Future    Basic metabolic panel; Future    glipiZIDE (GLUCOTROL XL) 5 mg 24 hr tablet; Take 1 tablet (5 mg total) by mouth 2 (two) times a day with meals    dapagliflozin (Farxiga) 10 MG tablet; Take 1 tablet (10 mg total) by mouth daily

## 2025-07-08 NOTE — PATIENT INSTRUCTIONS
Hypoglycemia instructions   Zuly Queen  7/8/2025  [id number]    Low Blood Sugar    Steps to treat low blood sugar.    1. Test blood sugar if you have symptoms of low blood sugar:   Low Blood Sugar Symptoms:  o Sweaty  o Dizzy  o Rapid heartbeat  o Shaky  o Bad mood  o Hungry      2. Treat blood sugar less than 70 with 15 grams of fast-acting carbohydrate:   Examples of 15 grams Fast-Acting Carbohydrate:  o 4 oz juice  o 4 oz regular soda  o 3-4 glucose tablets (chew)  o 3-4 hard candies (chew)          3.  Wait 15 minutes and test your blood sugar again     4. If blood sugar is less than 100, repeat steps 2-3.    5. When your blood sugar is 100 or more, eat a snack if it will be longer than one hour until your next meal. The snack should be 15 grams of carbohydrate and a protein:   Examples of snacks:  o ½ sandwich  o 6 crackers with cheese  o Piece of fruit with cheese or peanut butter  o 6 crackers with peanut butter

## 2025-07-09 ENCOUNTER — HOSPITAL ENCOUNTER (OUTPATIENT)
Dept: RADIOLOGY | Facility: HOSPITAL | Age: 65
Discharge: HOME | End: 2025-07-09
Attending: STUDENT IN AN ORGANIZED HEALTH CARE EDUCATION/TRAINING PROGRAM
Payer: COMMERCIAL

## 2025-07-09 ENCOUNTER — OFFICE VISIT (OUTPATIENT)
Dept: OBGYN CLINIC | Facility: CLINIC | Age: 65
End: 2025-07-09
Payer: COMMERCIAL

## 2025-07-09 VITALS — WEIGHT: 269.8 LBS | BODY MASS INDEX: 44.95 KG/M2 | HEIGHT: 65 IN

## 2025-07-09 DIAGNOSIS — M25.561 RIGHT KNEE PAIN, UNSPECIFIED CHRONICITY: ICD-10-CM

## 2025-07-09 DIAGNOSIS — S86.311A STRAIN OF PERONEAL TENDON OF RIGHT FOOT, INITIAL ENCOUNTER: Primary | ICD-10-CM

## 2025-07-09 DIAGNOSIS — Z01.89 ENCOUNTER FOR LOWER EXTREMITY COMPARISON IMAGING STUDY: ICD-10-CM

## 2025-07-09 DIAGNOSIS — M17.11 PRIMARY OSTEOARTHRITIS OF RIGHT KNEE: ICD-10-CM

## 2025-07-09 PROCEDURE — 99214 OFFICE O/P EST MOD 30 MIN: CPT | Performed by: STUDENT IN AN ORGANIZED HEALTH CARE EDUCATION/TRAINING PROGRAM

## 2025-07-09 PROCEDURE — 73564 X-RAY EXAM KNEE 4 OR MORE: CPT

## 2025-07-09 PROCEDURE — 73562 X-RAY EXAM OF KNEE 3: CPT

## 2025-07-09 NOTE — PROGRESS NOTES
Ortho Sports Medicine Knee New Patient Visit       Assesment:   64 y.o. female right knee osteoarthritis and right peroneal tendon strain    Assessment & Plan  Strain of peroneal tendon of right foot, initial encounter  Primary osteoarthritis of right knee  We reviewed their current history, physical exam, and imaging in detail today with the patient.  This is consistent with  right knee osteoarthritis and peroneal tendon strain.  We reviewed starting with a course of conservative management including physical therapy, activity modification, and over-the-counter anti-inflammatory medication.  The patient cannot take NSAIDs, and we reviewed using Tylenol for pain. Discussed attending formal physical therapy for strengthening, range of motion, and at home exercise program.  PT prescription provided to the patient today.  We also reviewed viscosupplementation for her right knee, Synvisc 1 was ordered in office today.  I did offer a duplex if she was concern for DVT, however she deferred given that she has no calf pain is mostly peroneal tendinitis, I discussed that DVTs were less likely in the anterolateral peroneal area.  I did discuss red flags look out for including if she develops any calf pain or shortness of breath, she should either call the office immediately or present to the emergency room associate she has shortness of breath.  All of the patient's questions were answered today, they are agreeable to this plan.  They will follow-up before right knee viscosupplementation injection.      Orders:    Ambulatory Referral to Physical Therapy; Future    Injection Procedure Prior Authorization; Future          Conservative treatment:    Ice to knee for 20 minutes at least 1-2 times daily.  PT for ROM/strengthening to knee, hip and core.    Imaging:    All imaging from today was reviewed by myself and explained to the patient.       Injection:    No Injection planned at this time.      Surgery:     No surgery is  recommended at this point, continue with conservative treatment plan as noted.      Follow up:  Follow-up for HA injection      Chief Complaint   Patient presents with    Right Knee - Pain       History of Present Illness:    The patient is a 64 y.o. female who presents for right knee and lower leg evaluation.  She reports that approximately 1 week ago she was driving to the [employer] and started to have pain in the lateral aspect of her right shin.  She reports the pain is through the muscle and down into her ankle.  She reports this is a cramping sensation as well as pain with activity such as walking.  She denies any acute injury, but reports that her ankle was in an inversion position for several hours.  She denies any numbness or tingling.  She has previously been treated for right knee osteoarthritis with corticosteroid injections as well as viscosupplementation several years ago.  She reports continued pain in the lateral aspect of her knee as well as posterior aspect.  She reports this is not the main reason she is in clinic today, but would like to discuss this as well.  Over the past week her pain has gotten better, but she continues to have pain with activity such as walking.  She presents to clinic today for initial evaluation.    Knee Surgical History:  Previous ACL surgery greater then 30 years ago     Past Medical, Social and Family History:  Past Medical History[1]  Past Surgical History[2]  Allergies[3]  Medications Ordered Prior to Encounter[4]  Social History     Socioeconomic History    Marital status: /Civil Union     Spouse name: Not on file    Number of children: Not on file    Years of education: Not on file    Highest education level: Not on file   Occupational History    Not on file   Tobacco Use    Smoking status: Never    Smokeless tobacco: Never   Vaping Use    Vaping status: Never Used   Substance and Sexual Activity    Alcohol use: Yes     Alcohol/week: 5.0 standard drinks of  "alcohol     Types: 5 Glasses of wine per week     Comment: occ.     Drug use: Never    Sexual activity: Not Currently     Partners: Male     Birth control/protection: None   Other Topics Concern    Not on file   Social History Narrative    Not on file     Social Drivers of Health     Financial Resource Strain: Not on file   Food Insecurity: Not on file   Transportation Needs: Not on file   Physical Activity: Not on file   Stress: Not on file   Social Connections: Not on file   Intimate Partner Violence: Not on file   Housing Stability: Not on file         I have reviewed the past medical, surgical, social and family history, medications and allergies as documented in the EMR.    Review of systems: ROS is negative other than that noted in the HPI.  Constitutional: Negative for fatigue and fever.   HENT: Negative for sore throat.    Respiratory: Negative for shortness of breath.    Cardiovascular: Negative for chest pain.   Gastrointestinal: Negative for abdominal pain.   Endocrine: Negative for cold intolerance and heat intolerance.   Genitourinary: Negative for flank pain.   Musculoskeletal: Negative for back pain.   Skin: Negative for rash.   Allergic/Immunologic: Negative for immunocompromised state.   Neurological: Negative for dizziness.   Psychiatric/Behavioral: Negative for agitation.      Physical Exam:    Height 5' 5\" (1.651 m), weight 122 kg (269 lb 12.8 oz).    General/Constitutional: NAD, well developed, well nourished  HENT: Normocephalic, atraumatic  CV: Intact distal pulses, regular rate  Resp: No respiratory distress or labored breathing  GI: Soft and non-tender   Lymphatic: No lymphadenopathy palpated  Neuro: Alert and Oriented x 3, no focal deficits  Psych: Normal mood, normal affect, normal judgement, normal behavior  Skin: Warm, dry, no rashes, no erythema      Knee Exam (focused):                RIGHT LEFT   ROM:   0-130 0-130   Palpation: Effusion negative negative     MJL tenderness Positive " Negative     LJL tenderness Positive Negative   Meniscus: Evie Negative Negative    Apley's Compression Negative Negative   Instability: Varus stable stable     Valgus stable stable   Special Tests: Lachman Negative Negative     Posterior drawer Negative Negative     Anterior drawer Negative Negative     Pivot shift not tested not tested     Dial not tested not tested   Patella: Palpation no tenderness no tenderness     Mobility 1/4 1/4     Apprehension Negative Negative   Other: Single leg 1/4 squat not tested not tested   Tender over peroneal tendons and muscles over anterolateral calf    LE NV Exam: +2 DP/PT pulses bilaterally  Sensation intact to light touch L2-S1 bilaterally     Bilateral hip ROM demonstrates no pain actively or passively    No calf tenderness to palpation bilaterally    Knee Imaging         I have personally reviewed and interpreted 4 radiographs of the bilateral knees including bilateral weightbearing AP, weightbearing PA flexion, sunrise, and weightbearing lateral of the affected knee which were reviewed in detail with the patient.  The joint spaces of the medial and lateral compartments show moderate to severe degenerative changes.  On the sunrise view, the patellofemoral compartment shows moderate degenerative changes. No acute fracture or dislocation. No other bony pathology is present.          Scribe Attestation      I,:  Madelaine Chau am acting as a scribe while in the presence of the attending physician.:       I,:  Chet Smith MD personally performed the services described in this documentation    as scribed in my presence.:                   [1]   Past Medical History:  Diagnosis Date    Acid reflux     CKD (chronic kidney disease), stage II     Diabetes mellitus (HCC)     Dizziness     Essential hypertension     Hyperlipidemia     Hypertension     Type 2 diabetes mellitus (HCC)    [2]   Past Surgical History:  Procedure Laterality Date    ANKLE SURGERY  Nov 2019     ARTHROSCOPIC REPAIR ACL Right     knee    BARIATRIC SURGERY      gastric sleeve    GALLBLADDER SURGERY      SKIN BIOPSY      scalp    TENDON REPAIR Left     ankle    TONSILLECTOMY      US GUIDED LYMPH NODE BIOPSY RIGHT  12/1/2023    US GUIDED THYROID BIOPSY  12/1/2023   [3]   Allergies  Allergen Reactions    Ampicillin Hives    Darvon [Propoxyphene] Vomiting    Lipitor [Atorvastatin] Arthralgia    Vioxx [Rofecoxib]    [4]   Current Outpatient Medications on File Prior to Visit   Medication Sig Dispense Refill    acarbose (PRECOSE) 25 mg tablet TAKE ONE TABLET WITH BREAKFAST, LUNCH, AND DINNER 270 tablet 1    amitriptyline (ELAVIL) 10 mg tablet TAKE 1 TABLET BY MOUTH DAILY AT BEDTIME 30 tablet 0    betamethasone dipropionate 0.05 % lotion Apply topically in the morning and in the evening. To affected area.      Biotin 1000 MCG CHEW Chew 6,000 mcg      Biotin 70128 MCG TABS Take 1/2 tablet daily ORAL      Calcium 500 MG tablet Take by mouth      Calcium Carb-Cholecalciferol 500-10 MG-MCG CHEW 400mc calcium/ vitamin D 12.5mcg ORAL      Continuous Blood Gluc  (FREESTYLE FRANKI 14 DAY READER) SUZY Use      Continuous Glucose Sensor (FreeStyle Franki 3 Sensor) MISC CHANGE 1 SENSOR EVERY 14 DAYS 6 each 1    dapagliflozin (Farxiga) 10 MG tablet Take 1 tablet (10 mg total) by mouth daily 90 tablet 1    dexamethasone (DECADRON) 2 mg tablet One tab with breakfast for 1-5 days for unrelenting migraine 5 tablet 2    DULoxetine (CYMBALTA) 30 mg delayed release capsule Take 30 mg by mouth in the morning.      glipiZIDE (GLUCOTROL XL) 5 mg 24 hr tablet Take 1 tablet (5 mg total) by mouth 2 (two) times a day with meals 180 tablet 1    lansoprazole (PREVACID) 15 mg capsule Take 15 mg by mouth in the morning.      lisinopril (ZESTRIL) 2.5 mg tablet Take 2.5 mg by mouth in the morning.      meclizine (ANTIVERT) 25 mg tablet Take 1 tablet (25 mg total) by mouth 3 (three) times a day as needed for dizziness 30 tablet 0    metFORMIN  (GLUCOPHAGE-XR) 500 mg 24 hr tablet TAKE 2 TABLETS BY MOUTH TWICE A DAY WITH FOOD 360 tablet 1    Multiple Vitamin (MULTIVITAMIN ADULT PO) Take 2 tablets by mouth daily      Cholecalciferol 50 MCG (2000 UT) CAPS Take 500 Units by mouth (Patient not taking: Reported on 3/25/2024)      fluconazole (DIFLUCAN) 200 mg tablet  (Patient not taking: Reported on 3/25/2024)      Multiple Vitamins-Minerals (MULTIVITAMIN WITH MINERALS) tablet Take 1 tablet by mouth in the morning.      nystatin (MYCOSTATIN) cream Apply topically as needed      nystatin (MYCOSTATIN) powder       ondansetron (ZOFRAN) 4 mg tablet Take 1 tablet (4 mg total) by mouth every 6 (six) hours (Patient not taking: Reported on 10/7/2024) 12 tablet 0    Probiotic Product (ALIGN PO) Take 1 tablet by mouth daily      Probiotic Product (ALIGN) 4 MG CAPS Take by mouth      rimegepant sulfate (Nurtec) 75 mg TBDP Take one NURTEC 75 mg at onset under tongue. Limit 1 in 24 hours. 8 a month. 8 tablet 3    rosuvastatin (CRESTOR) 5 mg tablet Take 5 mg by mouth in the morning.      vitamin B-12 (VITAMIN B-12) 1,000 mcg tablet Take 5,000 mcg by mouth daily Takes 1 daily (Patient not taking: Reported on 3/25/2024)      zolpidem (AMBIEN) 5 mg tablet Take 0.5 tablets (2.5 mg total) by mouth daily at bedtime as needed 30 tablet      No current facility-administered medications on file prior to visit.

## 2025-07-15 DIAGNOSIS — G43.009 MIGRAINE WITHOUT AURA AND WITHOUT STATUS MIGRAINOSUS, NOT INTRACTABLE: ICD-10-CM

## 2025-07-15 RX ORDER — AMITRIPTYLINE HYDROCHLORIDE 10 MG/1
10 TABLET ORAL
Qty: 30 TABLET | Refills: 0 | Status: SHIPPED | OUTPATIENT
Start: 2025-07-15

## 2025-07-17 ENCOUNTER — ANESTHESIA (OUTPATIENT)
Dept: GASTROENTEROLOGY | Facility: HOSPITAL | Age: 65
End: 2025-07-17
Payer: COMMERCIAL

## 2025-07-17 ENCOUNTER — ANESTHESIA EVENT (OUTPATIENT)
Dept: GASTROENTEROLOGY | Facility: HOSPITAL | Age: 65
End: 2025-07-17
Payer: COMMERCIAL

## 2025-07-17 ENCOUNTER — HOSPITAL ENCOUNTER (OUTPATIENT)
Dept: GASTROENTEROLOGY | Facility: HOSPITAL | Age: 65
Setting detail: OUTPATIENT SURGERY
End: 2025-07-17
Attending: INTERNAL MEDICINE
Payer: COMMERCIAL

## 2025-07-17 VITALS
OXYGEN SATURATION: 100 % | BODY MASS INDEX: 44.42 KG/M2 | SYSTOLIC BLOOD PRESSURE: 134 MMHG | WEIGHT: 266.6 LBS | RESPIRATION RATE: 16 BRPM | HEIGHT: 65 IN | DIASTOLIC BLOOD PRESSURE: 75 MMHG | HEART RATE: 65 BPM | TEMPERATURE: 97.9 F

## 2025-07-17 DIAGNOSIS — K31.A0 GASTRIC INTESTINAL METAPLASIA: ICD-10-CM

## 2025-07-17 LAB — GLUCOSE SERPL-MCNC: 212 MG/DL (ref 65–140)

## 2025-07-17 PROCEDURE — 88305 TISSUE EXAM BY PATHOLOGIST: CPT | Performed by: PATHOLOGY

## 2025-07-17 PROCEDURE — 88313 SPECIAL STAINS GROUP 2: CPT | Performed by: PATHOLOGY

## 2025-07-17 PROCEDURE — 43239 EGD BIOPSY SINGLE/MULTIPLE: CPT | Performed by: INTERNAL MEDICINE

## 2025-07-17 PROCEDURE — 82948 REAGENT STRIP/BLOOD GLUCOSE: CPT

## 2025-07-17 RX ORDER — SODIUM CHLORIDE, SODIUM LACTATE, POTASSIUM CHLORIDE, CALCIUM CHLORIDE 600; 310; 30; 20 MG/100ML; MG/100ML; MG/100ML; MG/100ML
INJECTION, SOLUTION INTRAVENOUS CONTINUOUS PRN
Status: DISCONTINUED | OUTPATIENT
Start: 2025-07-17 | End: 2025-07-17

## 2025-07-17 RX ORDER — LIDOCAINE HYDROCHLORIDE 10 MG/ML
INJECTION, SOLUTION EPIDURAL; INFILTRATION; INTRACAUDAL; PERINEURAL AS NEEDED
Status: DISCONTINUED | OUTPATIENT
Start: 2025-07-17 | End: 2025-07-17

## 2025-07-17 RX ORDER — PROPOFOL 10 MG/ML
INJECTION, EMULSION INTRAVENOUS AS NEEDED
Status: DISCONTINUED | OUTPATIENT
Start: 2025-07-17 | End: 2025-07-17

## 2025-07-17 RX ADMIN — LIDOCAINE HYDROCHLORIDE 50 MG: 10 INJECTION, SOLUTION EPIDURAL; INFILTRATION; INTRACAUDAL; PERINEURAL at 09:28

## 2025-07-17 RX ADMIN — PROPOFOL 50 MG: 10 INJECTION, EMULSION INTRAVENOUS at 09:29

## 2025-07-17 RX ADMIN — PROPOFOL 60 MG: 10 INJECTION, EMULSION INTRAVENOUS at 09:36

## 2025-07-17 RX ADMIN — PROPOFOL 50 MG: 10 INJECTION, EMULSION INTRAVENOUS at 09:34

## 2025-07-17 RX ADMIN — PROPOFOL 150 MG: 10 INJECTION, EMULSION INTRAVENOUS at 09:28

## 2025-07-17 RX ADMIN — SODIUM CHLORIDE, SODIUM LACTATE, POTASSIUM CHLORIDE, AND CALCIUM CHLORIDE: .6; .31; .03; .02 INJECTION, SOLUTION INTRAVENOUS at 09:23

## 2025-07-17 RX ADMIN — PROPOFOL 50 MG: 10 INJECTION, EMULSION INTRAVENOUS at 09:31

## 2025-07-17 NOTE — H&P
History and Physical - [facility]  Zuly Queen 64 y.o. female MRN: [id number]                  HPI: Zuly Queen is a 64 y.o. year old female who presents for EGD      REVIEW OF SYSTEMS: Per the HPI, and otherwise unremarkable.    Historical Information   Past Medical History[1]  Past Surgical History[2]  Social History   Social History     Substance and Sexual Activity   Alcohol Use Yes    Alcohol/week: 5.0 standard drinks of alcohol    Types: 5 Glasses of wine per week    Comment: occ.      Social History     Substance and Sexual Activity   Drug Use Never     Tobacco Use History[3]  Family History[4]    Meds/Allergies     Current Medications[5]    Allergies[6]    Objective     There were no vitals taken for this visit.      PHYSICAL EXAM    Gen: NAD  Head: NCAT  CV: RRR  CHEST: Clear  ABD: soft, NT/ND  EXT: no edema      ASSESSMENT/PLAN:  This is a 64 y.o. year old female here for EGD, and she is stable and optimized for her procedure.             [1]   Past Medical History:  Diagnosis Date    Acid reflux     CKD (chronic kidney disease), stage II     Diabetes mellitus (HCC)     Dizziness     Essential hypertension     Hyperlipidemia     Hypertension     Type 2 diabetes mellitus (HCC)    [2]   Past Surgical History:  Procedure Laterality Date    ANKLE SURGERY  Nov 2019    ARTHROSCOPIC REPAIR ACL Right     knee    BARIATRIC SURGERY      gastric sleeve    GALLBLADDER SURGERY      SKIN BIOPSY      scalp    TENDON REPAIR Left     ankle    TONSILLECTOMY      US GUIDED LYMPH NODE BIOPSY RIGHT  12/1/2023    US GUIDED THYROID BIOPSY  12/1/2023   [3]   Social History  Tobacco Use   Smoking Status Never   Smokeless Tobacco Never   [4]   Family History  Problem Relation Name Age of Onset    Hypertension Mother      Diabetes type II Father Ray crisman     Diabetes Father Ray crisman     Diabetes type II Paternal Grandmother Annette [place]     Cancer Paternal Grandmother Annette [place]     Breast cancer  Paternal Grandmother Annette [place] 70    Breast cancer Paternal Aunt  68   [5]   Current Outpatient Medications:     acarbose (PRECOSE) 25 mg tablet    amitriptyline (ELAVIL) 10 mg tablet    Calcium 500 MG tablet    dapagliflozin (Farxiga) 10 MG tablet    dexamethasone (DECADRON) 2 mg tablet    DULoxetine (CYMBALTA) 30 mg delayed release capsule    glipiZIDE (GLUCOTROL XL) 5 mg 24 hr tablet    lansoprazole (PREVACID) 15 mg capsule    lisinopril (ZESTRIL) 2.5 mg tablet    metFORMIN (GLUCOPHAGE-XR) 500 mg 24 hr tablet    Multiple Vitamin (MULTIVITAMIN ADULT PO)    rosuvastatin (CRESTOR) 5 mg tablet    zolpidem (AMBIEN) 5 mg tablet    betamethasone dipropionate 0.05 % lotion    Biotin 1000 MCG CHEW    Biotin 39775 MCG TABS    Calcium Carb-Cholecalciferol 500-10 MG-MCG CHEW    Cholecalciferol 50 MCG (2000 UT) CAPS    Continuous Blood Gluc  (FREESTYLE FRANKI 14 DAY READER) SUZY    Continuous Glucose Sensor (FreeStyle Franki 3 Sensor) MISC    fluconazole (DIFLUCAN) 200 mg tablet    meclizine (ANTIVERT) 25 mg tablet    Multiple Vitamins-Minerals (MULTIVITAMIN WITH MINERALS) tablet    nystatin (MYCOSTATIN) cream    nystatin (MYCOSTATIN) powder    ondansetron (ZOFRAN) 4 mg tablet    Probiotic Product (ALIGN PO)    Probiotic Product (ALIGN) 4 MG CAPS    rimegepant sulfate (Nurtec) 75 mg TBDP    vitamin B-12 (VITAMIN B-12) 1,000 mcg tablet  [6]   Allergies  Allergen Reactions    Ampicillin Hives    Darvon [Propoxyphene] Vomiting    Lipitor [Atorvastatin] Arthralgia    Vioxx [Rofecoxib]

## 2025-07-17 NOTE — ANESTHESIA POSTPROCEDURE EVALUATION
Post-Op Assessment Note    CV Status:  Stable  Pain Score: 0    Pain management: adequate       Mental Status:  Alert and awake   Hydration Status:  Euvolemic and stable   PONV Controlled:  Controlled   Airway Patency:  Patent and adequate     Post Op Vitals Reviewed: Yes    No anethesia notable event occurred.    Staff: CRNA           Last Filed PACU Vitals:  Vitals Value Taken Time   Temp 97.1 °F (36.2 °C) 07/17/25 09:41   Pulse 88 07/17/25 09:41   /63 07/17/25 09:41   Resp 16 07/17/25 09:41   SpO2 98 % 07/17/25 09:41       Modified Silke:     Vitals Value Taken Time   Activity 2 07/17/25 09:41   Respiration 2 07/17/25 09:41   Circulation 2 07/17/25 09:41   Consciousness 1 07/17/25 09:41   Oxygen Saturation 2 07/17/25 09:41     Modified Silke Score: 9

## 2025-07-17 NOTE — ANESTHESIA PREPROCEDURE EVALUATION
Procedure:  EGD    Relevant Problems   ANESTHESIA   (+) PONV (postoperative nausea and vomiting)      CARDIO   (+) Essential hypertension   (+) Migraine without aura and without status migrainosus, not intractable   (+) Mixed hyperlipidemia   (-) Chest pain   (-) PEDERSEN (dyspnea on exertion)      ENDO   (+) Type 2 diabetes mellitus with hyperglycemia, without long-term current use of insulin (HCC)      /RENAL   (+) CKD (chronic kidney disease), stage II      NEURO/PSYCH   (+) Anxiety disorder   (+) Headache   (+) Migraine without aura and without status migrainosus, not intractable   (+) Numbness and tingling of left side of face      PULMONARY   (+) Obstructive sleep apnea (adult) (pediatric)   (-) Shortness of breath   (-) URI (upper respiratory infection)        Physical Exam    Airway     Mallampati score: II  TM Distance: <3 FB  Neck ROM: full      Cardiovascular      Dental       Pulmonary      Neurological      Other Findings  post-pubertal.      Anesthesia Plan  ASA Score- 3     Anesthesia Type- IV sedation with anesthesia with ASA Monitors.         Additional Monitors:     Airway Plan: natural airway.           Plan Factors-Exercise tolerance (METS): >4 METS.    Chart reviewed. EKG reviewed.  Existing labs reviewed. Patient summary reviewed.                  Induction- intravenous.    Postoperative Plan- .   Monitoring Plan - Monitoring plan - standard ASA monitoring          Informed Consent- Anesthetic plan and risks discussed with patient.  I personally reviewed this patient with the CRNA. Discussed and agreed on the Anesthesia Plan with the CRNA..      NPO Status:  No vitals data found for the desired time range.

## 2025-07-18 ENCOUNTER — PROCEDURE VISIT (OUTPATIENT)
Dept: OBGYN CLINIC | Facility: CLINIC | Age: 65
End: 2025-07-18
Payer: COMMERCIAL

## 2025-07-18 VITALS — HEIGHT: 65 IN | WEIGHT: 266 LBS | BODY MASS INDEX: 44.32 KG/M2

## 2025-07-18 DIAGNOSIS — M17.11 PRIMARY OSTEOARTHRITIS OF RIGHT KNEE: Primary | ICD-10-CM

## 2025-07-18 PROCEDURE — 20610 DRAIN/INJ JOINT/BURSA W/O US: CPT | Performed by: STUDENT IN AN ORGANIZED HEALTH CARE EDUCATION/TRAINING PROGRAM

## 2025-07-18 NOTE — PROGRESS NOTES
"Name: Zuly Queen      : 1960      MRN: [id number]  Encounter Provider: Chet Smith MD  Encounter Date: 2025   Encounter department: [facility]  :  Assessment & Plan  Primary osteoarthritis of right knee    Orders:    Large joint arthrocentesis: R knee          Patient is here for her 1st injection of Synvisc One into the Right knee.     Physical exam of the knee shows no effusion, no ecchymosis.    Large joint arthrocentesis: R knee    Performed by: Chet Smith MD  Authorized by: Chet Smith MD    [place] Protocol:  procedure performed by consultantConsent: Verbal consent obtained. Written consent not obtained  Risks and benefits: risks, benefits and alternatives were discussed  Consent given by: patient  Time out: Immediately prior to procedure a \"time out\" was called to verify the correct patient, procedure, equipment, support staff and site/side marked as required.  Patient understanding: patient states understanding of the procedure being performed  Site marked: the operative site was marked  Patient identity confirmed: verbally with patient  Supporting Documentation  Indications: pain     Is this a Visco injection? Yes  Non-Pharmacologic Treatments Attempted: Home Exercise  Pharmacologic Treatments Attempted: Cortisone   Pain Score: 7Procedure Details  Location: knee - R knee  Preparation: Patient was prepped and draped in the usual sterile fashion  Needle size: 22 G  Ultrasound guidance: no  Medications administered: 48 mg hylan 48 MG/6ML    Patient tolerance: patient tolerated the procedure well with no immediate complications  Dressing:  Sterile dressing applied          Patient tolerated procedure follow up in 3 months    "

## 2025-07-29 ENCOUNTER — TELEPHONE (OUTPATIENT)
Dept: NEUROLOGY | Facility: CLINIC | Age: 65
End: 2025-07-29

## 2025-08-06 ENCOUNTER — OFFICE VISIT (OUTPATIENT)
Dept: BARIATRICS | Facility: CLINIC | Age: 65
End: 2025-08-06
Payer: COMMERCIAL

## 2025-08-06 VITALS
OXYGEN SATURATION: 99 % | HEIGHT: 64 IN | SYSTOLIC BLOOD PRESSURE: 130 MMHG | WEIGHT: 270.4 LBS | DIASTOLIC BLOOD PRESSURE: 80 MMHG | HEART RATE: 76 BPM | BODY MASS INDEX: 46.16 KG/M2

## 2025-08-06 DIAGNOSIS — K91.2 POSTSURGICAL MALABSORPTION: ICD-10-CM

## 2025-08-06 DIAGNOSIS — E66.01 MORBID OBESITY (HCC): ICD-10-CM

## 2025-08-06 DIAGNOSIS — G47.33 OBSTRUCTIVE SLEEP APNEA (ADULT) (PEDIATRIC): ICD-10-CM

## 2025-08-06 DIAGNOSIS — K59.00 CONSTIPATION: ICD-10-CM

## 2025-08-06 DIAGNOSIS — I10 ESSENTIAL HYPERTENSION: ICD-10-CM

## 2025-08-06 DIAGNOSIS — E66.813 OBESITY, CLASS III, BMI 40-49.9 (MORBID OBESITY): ICD-10-CM

## 2025-08-06 DIAGNOSIS — E11.65 TYPE 2 DIABETES MELLITUS WITH HYPERGLYCEMIA, WITHOUT LONG-TERM CURRENT USE OF INSULIN (HCC): ICD-10-CM

## 2025-08-06 DIAGNOSIS — E78.2 MIXED HYPERLIPIDEMIA: ICD-10-CM

## 2025-08-06 DIAGNOSIS — Z48.815 ENCOUNTER FOR SURGICAL AFTERCARE FOLLOWING SURGERY OF DIGESTIVE SYSTEM: Primary | ICD-10-CM

## 2025-08-06 PROCEDURE — 99204 OFFICE O/P NEW MOD 45 MIN: CPT | Performed by: PHYSICIAN ASSISTANT

## 2025-08-14 ENCOUNTER — APPOINTMENT (OUTPATIENT)
Dept: LAB | Facility: CLINIC | Age: 65
End: 2025-08-14
Payer: COMMERCIAL